# Patient Record
Sex: FEMALE | Race: WHITE | NOT HISPANIC OR LATINO | Employment: OTHER | RURAL
[De-identification: names, ages, dates, MRNs, and addresses within clinical notes are randomized per-mention and may not be internally consistent; named-entity substitution may affect disease eponyms.]

---

## 2020-02-14 ENCOUNTER — HISTORICAL (OUTPATIENT)
Dept: ADMINISTRATIVE | Facility: HOSPITAL | Age: 65
End: 2020-02-14

## 2020-02-17 LAB
LAB AP CLINICAL INFORMATION: NORMAL
LAB AP DIAGNOSIS - HISTORICAL: NORMAL
LAB AP GROSS PATHOLOGY - HISTORICAL: NORMAL
LAB AP SPECIMEN SUBMITTED - HISTORICAL: NORMAL

## 2020-03-24 ENCOUNTER — HISTORICAL (OUTPATIENT)
Dept: ADMINISTRATIVE | Facility: HOSPITAL | Age: 65
End: 2020-03-24

## 2020-03-24 LAB
ALBUMIN SERPL BCP-MCNC: 3.8 G/DL (ref 3.5–5)
ALBUMIN/GLOB SERPL: 1.1 {RATIO}
ALP SERPL-CCNC: 106 U/L (ref 50–130)
ALT SERPL W P-5'-P-CCNC: 43 U/L (ref 13–56)
AMPHET UR QL SCN: NEGATIVE
AMYLASE SERPL-CCNC: 23 U/L (ref 25–115)
APAP UR QL SCN: ABNORMAL
AST SERPL W P-5'-P-CCNC: 26 U/L (ref 15–37)
BACTERIA #/AREA URNS HPF: ABNORMAL /HPF
BARBITURATES UR QL SCN: NEGATIVE
BASOPHILS # BLD AUTO: 0.02 X10E3/UL (ref 0–0.2)
BASOPHILS NFR BLD AUTO: 0.3 % (ref 0–1)
BENZODIAZ METAB UR QL SCN: NEGATIVE
BILIRUB SERPL-MCNC: 0.3 MG/DL (ref 0–1.2)
BILIRUB UR QL STRIP: NEGATIVE MG/DL
BUN SERPL-MCNC: 12 MG/DL (ref 7–18)
BUN/CREAT SERPL: 13.2
CALCIUM SERPL-MCNC: 10.8 MG/DL (ref 8.5–10.1)
CAOX CRY #/AREA URNS LPF: ABNORMAL /LPF
CHLORIDE SERPL-SCNC: 103 MMOL/L (ref 98–107)
CLARITY UR: CLEAR
CLARITY UR: CLEAR
CO2 SERPL-SCNC: 30 MMOL/L (ref 21–32)
COCAINE UR QL SCN: NEGATIVE
COLOR UR: YELLOW
COLOR UR: YELLOW
CREAT SERPL-MCNC: 0.91 MG/DL (ref 0.55–1.02)
EOSINOPHIL # BLD AUTO: 0.09 X10E3/UL (ref 0–0.5)
EOSINOPHIL NFR BLD AUTO: 1.3 % (ref 1–4)
ERYTHROCYTE [DISTWIDTH] IN BLOOD BY AUTOMATED COUNT: 13.2 % (ref 11.5–14.5)
GLOBULIN SER-MCNC: 3.5 G/DL (ref 2–4)
GLUCOSE SERPL-MCNC: 186 MG/DL (ref 74–106)
GLUCOSE UR STRIP-MCNC: NORMAL MG/DL
HCT VFR BLD AUTO: 37.9 % (ref 38–47)
HGB BLD-MCNC: 12.8 G/DL (ref 12–16)
HYALINE CASTS #/AREA URNS LPF: ABNORMAL /LPF (ref 0–2)
IMM GRANULOCYTES # BLD AUTO: 0.02 X10E3/UL (ref 0–0.04)
IMM GRANULOCYTES NFR BLD: 0.3 % (ref 0–0.4)
KETONES UR STRIP-SCNC: NEGATIVE MG/DL
LEUKOCYTE ESTERASE UR QL STRIP: NEGATIVE LEU/UL
LIPASE SERPL-CCNC: 75 U/L (ref 73–393)
LYMPHOCYTES # BLD AUTO: 1.88 X10E3/UL (ref 1–4.8)
LYMPHOCYTES NFR BLD AUTO: 26.1 % (ref 27–41)
MCH RBC QN AUTO: 29.4 PG (ref 27–31)
MCHC RBC AUTO-ENTMCNC: 33.8 G/DL (ref 32–36)
MCV RBC AUTO: 86.9 FL (ref 80–96)
MDA UR QL SCN: NEGATIVE
METHADONE UR QL SCN: ABNORMAL
METHAMPHET UR QL SCN: ABNORMAL
MONOCYTES # BLD AUTO: 0.34 X10E3/UL (ref 0–0.8)
MONOCYTES NFR BLD AUTO: 4.7 % (ref 2–6)
MPC BLD CALC-MCNC: 10.1 FL (ref 9.4–12.4)
MUCOUS THREADS #/AREA URNS HPF: ABNORMAL /HPF
NEUTROPHILS # BLD AUTO: 4.85 X10E3/UL (ref 1.8–7.7)
NEUTROPHILS NFR BLD AUTO: 67.3 % (ref 53–65)
NITRITE UR QL STRIP: NEGATIVE
OPIATES UR QL SCN: POSITIVE
OXYCODONE UR QL SCN: NEGATIVE
PCP UR QL SCN: NEGATIVE
PH UR STRIP: 5 PH UNITS (ref 5–8)
PLATELET # BLD AUTO: 229 X10E3/UL (ref 150–400)
POTASSIUM SERPL-SCNC: 3.9 MMOL/L (ref 3.5–5.1)
PROPOXYPH UR QL SCN: NEGATIVE
PROT SERPL-MCNC: 7.3 G/DL (ref 6.4–8.2)
PROT UR QL STRIP: 30 MG/DL
RBC # BLD AUTO: 4.36 X10E6/UL (ref 4.2–5.4)
RBC # UR STRIP: ABNORMAL ERY/UL
RBC #/AREA URNS HPF: ABNORMAL /HPF (ref 0–3)
SODIUM SERPL-SCNC: 140 MMOL/L (ref 136–145)
SP GR UR STRIP: >=1.03 (ref 1–1.03)
SQUAMOUS #/AREA URNS LPF: ABNORMAL /LPF
THC UR QL SCN: NEGATIVE
TRANS CELLS #/AREA URNS LPF: ABNORMAL /LPF
TRICYCLICS UR QL SCN: NEGATIVE
TROPONIN I SERPL-MCNC: <0.017 NG/ML (ref 0–0.06)
UROBILINOGEN UR STRIP-ACNC: 0.2 MG/DL
WBC # BLD AUTO: 7.2 X10E3/UL (ref 4.5–11)
WBC #/AREA URNS HPF: ABNORMAL /HPF (ref 0–5)

## 2020-03-27 LAB
REPORT: NORMAL

## 2020-05-27 ENCOUNTER — HISTORICAL (OUTPATIENT)
Dept: ADMINISTRATIVE | Facility: HOSPITAL | Age: 65
End: 2020-05-27

## 2020-07-02 ENCOUNTER — HISTORICAL (OUTPATIENT)
Dept: ADMINISTRATIVE | Facility: HOSPITAL | Age: 65
End: 2020-07-02

## 2020-07-02 LAB
BACTERIA #/AREA URNS HPF: ABNORMAL /HPF
BILIRUB UR QL STRIP: NEGATIVE MG/DL
BUN SERPL-MCNC: 12 MG/DL (ref 7–18)
CALCIUM SERPL-MCNC: 10.3 MG/DL (ref 8.5–10.1)
CHLORIDE SERPL-SCNC: 105 MMOL/L (ref 98–107)
CLARITY UR: ABNORMAL
CLARITY UR: ABNORMAL
CO2 SERPL-SCNC: 28 MMOL/L (ref 21–32)
COLOR UR: YELLOW
COLOR UR: YELLOW
CREAT SERPL-MCNC: 1.01 MG/DL (ref 0.55–1.02)
GLUCOSE SERPL-MCNC: 204 MG/DL (ref 74–106)
GLUCOSE UR STRIP-MCNC: 500 MG/DL
KETONES UR STRIP-SCNC: ABNORMAL MG/DL
LEUKOCYTE ESTERASE UR QL STRIP: NEGATIVE LEU/UL
NITRITE UR QL STRIP: NEGATIVE
PH UR STRIP: 6.5 PH UNITS (ref 5–8)
POTASSIUM SERPL-SCNC: 4 MMOL/L (ref 3.5–5.1)
PROT UR QL STRIP: 30 MG/DL
RBC # UR STRIP: ABNORMAL ERY/UL
RBC #/AREA URNS HPF: ABNORMAL /HPF (ref 0–3)
SODIUM SERPL-SCNC: 141 MMOL/L (ref 136–145)
SP GR UR STRIP: 1.02 (ref 1–1.03)
SQUAMOUS #/AREA URNS LPF: ABNORMAL /LPF
UROBILINOGEN UR STRIP-ACNC: 0.2 MG/DL
WBC #/AREA URNS HPF: ABNORMAL /HPF (ref 0–5)

## 2020-07-20 ENCOUNTER — HISTORICAL (OUTPATIENT)
Dept: ADMINISTRATIVE | Facility: HOSPITAL | Age: 65
End: 2020-07-20

## 2020-07-20 LAB
ALBUMIN SERPL BCP-MCNC: 4 G/DL (ref 3.5–5)
ALP SERPL-CCNC: 106 U/L (ref 50–130)
ALT SERPL W P-5'-P-CCNC: 32 U/L (ref 13–56)
ANION GAP SERPL CALCULATED.3IONS-SCNC: 7 MMOL/L (ref 7–16)
AST SERPL W P-5'-P-CCNC: 19 U/L (ref 15–37)
BASOPHILS # BLD AUTO: 0.04 X10E3/UL (ref 0–0.2)
BASOPHILS NFR BLD AUTO: 0.6 % (ref 0–1)
BILIRUB DIRECT SERPL-MCNC: 0.2 MG/DL (ref 0–0.2)
BILIRUB SERPL-MCNC: 0.5 MG/DL (ref 0–1.2)
BUN SERPL-MCNC: 17 MG/DL (ref 7–18)
CALCIUM SERPL-MCNC: 10.8 MG/DL (ref 8.5–10.1)
CHLORIDE SERPL-SCNC: 106 MMOL/L (ref 98–107)
CO2 SERPL-SCNC: 30 MMOL/L (ref 21–32)
CREAT SERPL-MCNC: 0.83 MG/DL (ref 0.5–1.02)
D DIMER PPP FEU-MCNC: 0.42 UG/ML (ref 0–0.47)
EOSINOPHIL # BLD AUTO: 0.04 X10E3/UL (ref 0–0.5)
EOSINOPHIL NFR BLD AUTO: 0.6 % (ref 1–4)
ERYTHROCYTE [DISTWIDTH] IN BLOOD BY AUTOMATED COUNT: 12.2 % (ref 11.5–14.5)
GLUCOSE SERPL-MCNC: 115 MG/DL (ref 74–106)
HCT VFR BLD AUTO: 40.9 % (ref 38–47)
HGB BLD-MCNC: 13.6 G/DL (ref 12–16)
IMM GRANULOCYTES # BLD AUTO: 0.01 X10E3/UL (ref 0–0.04)
IMM GRANULOCYTES NFR BLD: 0.1 % (ref 0–0.4)
LYMPHOCYTES # BLD AUTO: 2.19 X10E3/UL (ref 1–4.8)
LYMPHOCYTES NFR BLD AUTO: 32.1 % (ref 27–41)
MCH RBC QN AUTO: 28.6 PG (ref 27–31)
MCHC RBC AUTO-ENTMCNC: 33.3 G/DL (ref 32–36)
MCV RBC AUTO: 86.1 FL (ref 80–96)
MONOCYTES # BLD AUTO: 0.28 X10E3/UL (ref 0–0.8)
MONOCYTES NFR BLD AUTO: 4.1 % (ref 2–6)
MPC BLD CALC-MCNC: 10.6 FL (ref 9.4–12.4)
NEUTROPHILS # BLD AUTO: 4.26 X10E3/UL (ref 1.8–7.7)
NEUTROPHILS NFR BLD AUTO: 62.5 % (ref 53–65)
NRBC # BLD AUTO: 0 X10E3/UL (ref 0–0)
NRBC, AUTO (.00): 0 /100 (ref 0–0)
PLATELET # BLD AUTO: 209 X10E3/UL (ref 150–400)
POTASSIUM SERPL-SCNC: 4.1 MMOL/L (ref 3.5–5.1)
PROT SERPL-MCNC: 7.8 G/DL (ref 6.4–8.2)
RBC # BLD AUTO: 4.75 X10E6/UL (ref 4.2–5.4)
SODIUM SERPL-SCNC: 139 MMOL/L (ref 136–145)
WBC # BLD AUTO: 6.82 X10E3/UL (ref 4.5–11)

## 2020-07-22 ENCOUNTER — HISTORICAL (OUTPATIENT)
Dept: ADMINISTRATIVE | Facility: HOSPITAL | Age: 65
End: 2020-07-22

## 2020-07-22 LAB — PTH-INTACT SERPL-MCNC: 98.3 PG/ML (ref 18.4–80.1)

## 2020-11-04 ENCOUNTER — HISTORICAL (OUTPATIENT)
Dept: ADMINISTRATIVE | Facility: HOSPITAL | Age: 65
End: 2020-11-04

## 2020-11-04 LAB
EST. AVERAGE GLUCOSE BLD GHB EST-MCNC: 204 MG/DL
HBA1C MFR BLD HPLC: 8.7 % (ref 4.5–6.6)

## 2020-12-22 ENCOUNTER — HISTORICAL (OUTPATIENT)
Dept: ADMINISTRATIVE | Facility: HOSPITAL | Age: 65
End: 2020-12-22

## 2020-12-22 LAB — SARS-COV+SARS-COV-2 AG RESP QL IA.RAPID: NEGATIVE

## 2021-04-06 ENCOUNTER — HISTORICAL (OUTPATIENT)
Dept: ADMINISTRATIVE | Facility: HOSPITAL | Age: 66
End: 2021-04-06

## 2021-04-06 LAB
EST. AVERAGE GLUCOSE BLD GHB EST-MCNC: 220 MG/DL
HBA1C MFR BLD HPLC: 9.2 % (ref 4.5–6.6)

## 2021-04-08 LAB
REPORT: NORMAL

## 2021-06-30 ENCOUNTER — APPOINTMENT (OUTPATIENT)
Dept: RADIOLOGY | Facility: CLINIC | Age: 66
End: 2021-06-30
Attending: FAMILY MEDICINE
Payer: MEDICAID

## 2021-06-30 ENCOUNTER — OFFICE VISIT (OUTPATIENT)
Dept: PRIMARY CARE CLINIC | Facility: CLINIC | Age: 66
End: 2021-06-30
Payer: MEDICAID

## 2021-06-30 VITALS
SYSTOLIC BLOOD PRESSURE: 138 MMHG | RESPIRATION RATE: 20 BRPM | WEIGHT: 196 LBS | BODY MASS INDEX: 32.65 KG/M2 | TEMPERATURE: 98 F | HEART RATE: 94 BPM | DIASTOLIC BLOOD PRESSURE: 88 MMHG | HEIGHT: 65 IN | OXYGEN SATURATION: 98 %

## 2021-06-30 DIAGNOSIS — E13.9 DIABETES 1.5, MANAGED AS TYPE 2: ICD-10-CM

## 2021-06-30 DIAGNOSIS — M25.562 ACUTE PAIN OF LEFT KNEE: ICD-10-CM

## 2021-06-30 DIAGNOSIS — M19.90 ARTHRITIS: ICD-10-CM

## 2021-06-30 DIAGNOSIS — M25.562 ACUTE PAIN OF LEFT KNEE: Primary | ICD-10-CM

## 2021-06-30 PROCEDURE — 73560 X-RAY EXAM OF KNEE 1 OR 2: CPT | Mod: TC,RHCUB,LT | Performed by: FAMILY MEDICINE

## 2021-06-30 PROCEDURE — 73560 X-RAY EXAM OF KNEE 1 OR 2: CPT | Mod: 26,LT,, | Performed by: RADIOLOGY

## 2021-06-30 PROCEDURE — 99212 PR OFFICE/OUTPT VISIT, EST, LEVL II, 10-19 MIN: ICD-10-PCS | Mod: ,,, | Performed by: FAMILY MEDICINE

## 2021-06-30 PROCEDURE — 99212 OFFICE O/P EST SF 10 MIN: CPT | Mod: ,,, | Performed by: FAMILY MEDICINE

## 2021-06-30 PROCEDURE — 73560 XR KNEE 1 OR 2 VIEW LEFT: ICD-10-PCS | Mod: 26,LT,, | Performed by: RADIOLOGY

## 2021-06-30 RX ORDER — HYDROCODONE BITARTRATE AND ACETAMINOPHEN 10; 325 MG/1; MG/1
1 TABLET ORAL EVERY 6 HOURS PRN
Status: ON HOLD | COMMUNITY
Start: 2021-06-21 | End: 2021-09-13 | Stop reason: HOSPADM

## 2021-06-30 RX ORDER — LISINOPRIL 10 MG/1
10 TABLET ORAL DAILY
Status: ON HOLD | COMMUNITY
Start: 2021-04-06 | End: 2022-09-14 | Stop reason: ALTCHOICE

## 2021-06-30 RX ORDER — INSULIN GLARGINE 100 [IU]/ML
62 INJECTION, SOLUTION SUBCUTANEOUS NIGHTLY
Status: ON HOLD | COMMUNITY
Start: 2021-06-21 | End: 2022-09-30 | Stop reason: CLARIF

## 2021-06-30 RX ORDER — CELECOXIB 200 MG/1
2 CAPSULE ORAL 2 TIMES DAILY
Status: ON HOLD | COMMUNITY
Start: 2021-06-04 | End: 2022-09-14 | Stop reason: ALTCHOICE

## 2021-07-12 DIAGNOSIS — M25.562 LEFT KNEE PAIN: Primary | ICD-10-CM

## 2021-07-13 ENCOUNTER — HOSPITAL ENCOUNTER (OUTPATIENT)
Dept: RADIOLOGY | Facility: HOSPITAL | Age: 66
Discharge: HOME | End: 2021-07-13
Attending: ORTHOPAEDIC SURGERY
Payer: MEDICARE

## 2021-07-13 ENCOUNTER — OFFICE VISIT (OUTPATIENT)
Dept: ORTHOPEDICS | Facility: CLINIC | Age: 66
End: 2021-07-13
Payer: MEDICARE

## 2021-07-13 DIAGNOSIS — M25.562 LEFT KNEE PAIN: ICD-10-CM

## 2021-07-13 DIAGNOSIS — S83.242A ACUTE MEDIAL MENISCUS TEAR OF LEFT KNEE, INITIAL ENCOUNTER: Primary | ICD-10-CM

## 2021-07-13 PROCEDURE — 99203 OFFICE O/P NEW LOW 30 MIN: CPT | Mod: ,,, | Performed by: ORTHOPAEDIC SURGERY

## 2021-07-13 PROCEDURE — 73564 XR KNEE COMP 4 OR MORE VIEWS LEFT: ICD-10-PCS | Mod: 26,LT,, | Performed by: ORTHOPAEDIC SURGERY

## 2021-07-13 PROCEDURE — 99203 PR OFFICE/OUTPT VISIT, NEW, LEVL III, 30-44 MIN: ICD-10-PCS | Mod: ,,, | Performed by: ORTHOPAEDIC SURGERY

## 2021-07-13 PROCEDURE — 73564 X-RAY EXAM KNEE 4 OR MORE: CPT | Mod: 26,LT,, | Performed by: ORTHOPAEDIC SURGERY

## 2021-07-13 PROCEDURE — 73564 X-RAY EXAM KNEE 4 OR MORE: CPT | Mod: TC,LT

## 2021-07-13 RX ORDER — NAPROXEN 500 MG/1
500 TABLET ORAL 2 TIMES DAILY WITH MEALS
Qty: 60 TABLET | Refills: 2 | Status: SHIPPED | OUTPATIENT
Start: 2021-07-13 | End: 2021-08-23

## 2021-08-04 ENCOUNTER — TELEPHONE (OUTPATIENT)
Dept: ORTHOPEDICS | Facility: CLINIC | Age: 66
End: 2021-08-04

## 2021-08-31 ENCOUNTER — OFFICE VISIT (OUTPATIENT)
Dept: ORTHOPEDICS | Facility: CLINIC | Age: 66
End: 2021-08-31
Payer: MEDICARE

## 2021-08-31 DIAGNOSIS — S83.242A ACUTE MEDIAL MENISCUS TEAR OF LEFT KNEE, INITIAL ENCOUNTER: ICD-10-CM

## 2021-08-31 DIAGNOSIS — M17.12 PRIMARY OSTEOARTHRITIS OF LEFT KNEE: Primary | ICD-10-CM

## 2021-08-31 PROCEDURE — 99213 PR OFFICE/OUTPT VISIT, EST, LEVL III, 20-29 MIN: ICD-10-PCS | Mod: ,,, | Performed by: ORTHOPAEDIC SURGERY

## 2021-08-31 PROCEDURE — 99213 OFFICE O/P EST LOW 20 MIN: CPT | Mod: ,,, | Performed by: ORTHOPAEDIC SURGERY

## 2021-08-31 RX ORDER — TRAMADOL HYDROCHLORIDE 50 MG/1
50 TABLET ORAL EVERY 6 HOURS PRN
Qty: 30 TABLET | Refills: 0 | Status: SHIPPED | OUTPATIENT
Start: 2021-08-31 | End: 2021-11-30 | Stop reason: SDUPTHER

## 2021-09-03 DIAGNOSIS — S83.207A ACUTE MENISCAL TEAR OF LEFT KNEE: ICD-10-CM

## 2021-09-03 DIAGNOSIS — S83.242A ACUTE MEDIAL MENISCUS TEAR OF LEFT KNEE, INITIAL ENCOUNTER: Primary | ICD-10-CM

## 2021-09-03 RX ORDER — MUPIROCIN 20 MG/G
OINTMENT TOPICAL
Status: CANCELLED | OUTPATIENT
Start: 2021-09-03

## 2021-09-03 RX ORDER — SODIUM CHLORIDE 9 MG/ML
INJECTION, SOLUTION INTRAVENOUS CONTINUOUS
Status: CANCELLED | OUTPATIENT
Start: 2021-09-03

## 2021-09-09 ENCOUNTER — CLINICAL SUPPORT (OUTPATIENT)
Dept: CARDIOLOGY | Facility: CLINIC | Age: 66
End: 2021-09-09
Payer: MEDICARE

## 2021-09-09 DIAGNOSIS — Z11.59 SPECIAL SCREENING EXAMINATION FOR UNSPECIFIED VIRAL DISEASE: Primary | ICD-10-CM

## 2021-09-09 DIAGNOSIS — Z01.818 PREPROCEDURAL EXAMINATION: ICD-10-CM

## 2021-09-09 DIAGNOSIS — Z01.812 ENCOUNTER FOR PREPROCEDURAL LABORATORY EXAMINATION: ICD-10-CM

## 2021-09-09 PROCEDURE — 93010 EKG 12-LEAD: ICD-10-PCS | Mod: S$PBB,,, | Performed by: STUDENT IN AN ORGANIZED HEALTH CARE EDUCATION/TRAINING PROGRAM

## 2021-09-09 PROCEDURE — 93010 ELECTROCARDIOGRAM REPORT: CPT | Mod: S$PBB,,, | Performed by: STUDENT IN AN ORGANIZED HEALTH CARE EDUCATION/TRAINING PROGRAM

## 2021-09-09 PROCEDURE — 99212 OFFICE O/P EST SF 10 MIN: CPT | Mod: PBBFAC

## 2021-09-09 PROCEDURE — 93005 ELECTROCARDIOGRAM TRACING: CPT | Mod: PBBFAC | Performed by: STUDENT IN AN ORGANIZED HEALTH CARE EDUCATION/TRAINING PROGRAM

## 2021-09-09 RX ORDER — LISINOPRIL 20 MG/1
40 TABLET ORAL DAILY
Status: ON HOLD | COMMUNITY
Start: 2021-07-27 | End: 2022-09-14 | Stop reason: ALTCHOICE

## 2021-09-09 RX ORDER — AZITHROMYCIN 250 MG/1
TABLET, FILM COATED ORAL
COMMUNITY
Start: 2021-08-05 | End: 2022-02-01

## 2021-09-09 RX ORDER — CEPHALEXIN 500 MG/1
CAPSULE ORAL
COMMUNITY
Start: 2021-07-27 | End: 2022-02-01

## 2021-09-13 ENCOUNTER — ANESTHESIA EVENT (OUTPATIENT)
Dept: SURGERY | Facility: HOSPITAL | Age: 66
End: 2021-09-13
Payer: MEDICARE

## 2021-09-13 ENCOUNTER — HOSPITAL ENCOUNTER (OUTPATIENT)
Facility: HOSPITAL | Age: 66
Discharge: HOME | End: 2021-09-13
Attending: ORTHOPAEDIC SURGERY | Admitting: ORTHOPAEDIC SURGERY
Payer: MEDICARE

## 2021-09-13 ENCOUNTER — ANESTHESIA (OUTPATIENT)
Dept: SURGERY | Facility: HOSPITAL | Age: 66
End: 2021-09-13
Payer: MEDICARE

## 2021-09-13 VITALS
BODY MASS INDEX: 32.49 KG/M2 | TEMPERATURE: 98 F | OXYGEN SATURATION: 97 % | SYSTOLIC BLOOD PRESSURE: 175 MMHG | HEIGHT: 65 IN | WEIGHT: 195 LBS | RESPIRATION RATE: 16 BRPM | DIASTOLIC BLOOD PRESSURE: 73 MMHG | HEART RATE: 109 BPM

## 2021-09-13 DIAGNOSIS — Z98.890 S/P LEFT KNEE ARTHROSCOPY: Primary | ICD-10-CM

## 2021-09-13 DIAGNOSIS — S83.207A ACUTE MENISCAL TEAR OF LEFT KNEE: ICD-10-CM

## 2021-09-13 DIAGNOSIS — S83.242A ACUTE MEDIAL MENISCUS TEAR OF LEFT KNEE, INITIAL ENCOUNTER: Primary | ICD-10-CM

## 2021-09-13 LAB
GLUCOSE SERPL-MCNC: 229 MG/DL (ref 70–105)
GLUCOSE SERPL-MCNC: 232 MG/DL (ref 70–105)

## 2021-09-13 PROCEDURE — 37000009 HC ANESTHESIA EA ADD 15 MINS: Performed by: ORTHOPAEDIC SURGERY

## 2021-09-13 PROCEDURE — C1713 ANCHOR/SCREW BN/BN,TIS/BN: HCPCS | Performed by: ORTHOPAEDIC SURGERY

## 2021-09-13 PROCEDURE — 29882 ARTHRS KNE SRG MNISC RPR M/L: CPT | Mod: LT,,, | Performed by: ORTHOPAEDIC SURGERY

## 2021-09-13 PROCEDURE — 29882 PR KNEE SCOPE,MED OR LAT MENIS REPAIR: ICD-10-PCS | Mod: LT,,, | Performed by: ORTHOPAEDIC SURGERY

## 2021-09-13 PROCEDURE — 71000033 HC RECOVERY, INTIAL HOUR: Performed by: ORTHOPAEDIC SURGERY

## 2021-09-13 PROCEDURE — 71000015 HC POSTOP RECOV 1ST HR: Performed by: ORTHOPAEDIC SURGERY

## 2021-09-13 PROCEDURE — D9220A PRA ANESTHESIA: ICD-10-PCS | Mod: ANES,ICN,, | Performed by: ANESTHESIOLOGY

## 2021-09-13 PROCEDURE — 82962 GLUCOSE BLOOD TEST: CPT

## 2021-09-13 PROCEDURE — D9220A PRA ANESTHESIA: Mod: ANES,ICN,, | Performed by: ANESTHESIOLOGY

## 2021-09-13 PROCEDURE — 27000177 HC AIRWAY, LARYNGEAL MASK: Performed by: ANESTHESIOLOGY

## 2021-09-13 PROCEDURE — 27201423 OPTIME MED/SURG SUP & DEVICES STERILE SUPPLY: Performed by: ORTHOPAEDIC SURGERY

## 2021-09-13 PROCEDURE — 36000710: Performed by: ORTHOPAEDIC SURGERY

## 2021-09-13 PROCEDURE — 36000711: Performed by: ORTHOPAEDIC SURGERY

## 2021-09-13 PROCEDURE — 63600175 PHARM REV CODE 636 W HCPCS: Performed by: NURSE ANESTHETIST, CERTIFIED REGISTERED

## 2021-09-13 PROCEDURE — D9220A PRA ANESTHESIA: ICD-10-PCS | Mod: CRNA,,, | Performed by: NURSE ANESTHETIST, CERTIFIED REGISTERED

## 2021-09-13 PROCEDURE — 29879 PR KNEE SCOPE,ABRASN ARTHROPLASTY: ICD-10-PCS | Mod: LT,,, | Performed by: ORTHOPAEDIC SURGERY

## 2021-09-13 PROCEDURE — 27100168 OPTIME MED/SURG SUP & DEVICES NON-STERILE SUPPLY: Performed by: ORTHOPAEDIC SURGERY

## 2021-09-13 PROCEDURE — 71000016 HC POSTOP RECOV ADDL HR: Performed by: ORTHOPAEDIC SURGERY

## 2021-09-13 PROCEDURE — 25000003 PHARM REV CODE 250: Performed by: NURSE ANESTHETIST, CERTIFIED REGISTERED

## 2021-09-13 PROCEDURE — 27000510 HC BLANKET BAIR HUGGER ANY SIZE: Performed by: ANESTHESIOLOGY

## 2021-09-13 PROCEDURE — 29879 ARTHRS KNE SRG ABRASJ ARTHRP: CPT | Mod: LT,,, | Performed by: ORTHOPAEDIC SURGERY

## 2021-09-13 PROCEDURE — 25000003 PHARM REV CODE 250: Performed by: ORTHOPAEDIC SURGERY

## 2021-09-13 PROCEDURE — 37000008 HC ANESTHESIA 1ST 15 MINUTES: Performed by: ORTHOPAEDIC SURGERY

## 2021-09-13 PROCEDURE — D9220A PRA ANESTHESIA: Mod: CRNA,,, | Performed by: NURSE ANESTHETIST, CERTIFIED REGISTERED

## 2021-09-13 PROCEDURE — 97161 PT EVAL LOW COMPLEX 20 MIN: CPT

## 2021-09-13 PROCEDURE — 63600175 PHARM REV CODE 636 W HCPCS: Performed by: ORTHOPAEDIC SURGERY

## 2021-09-13 PROCEDURE — 63600175 PHARM REV CODE 636 W HCPCS: Performed by: ANESTHESIOLOGY

## 2021-09-13 PROCEDURE — 27000716 HC OXISENSOR PROBE, ANY SIZE: Performed by: ANESTHESIOLOGY

## 2021-09-13 PROCEDURE — 25000003 PHARM REV CODE 250: Performed by: ANESTHESIOLOGY

## 2021-09-13 DEVICE — IMPLANTABLE DEVICE: Type: IMPLANTABLE DEVICE | Site: KNEE | Status: FUNCTIONAL

## 2021-09-13 RX ORDER — OXYCODONE AND ACETAMINOPHEN 5; 325 MG/1; MG/1
1 TABLET ORAL EVERY 4 HOURS PRN
Status: DISCONTINUED | OUTPATIENT
Start: 2021-09-13 | End: 2021-09-13 | Stop reason: HOSPADM

## 2021-09-13 RX ORDER — ONDANSETRON 4 MG/1
4 TABLET, ORALLY DISINTEGRATING ORAL EVERY 6 HOURS PRN
Qty: 30 TABLET | Refills: 0 | Status: SHIPPED | OUTPATIENT
Start: 2021-09-13 | End: 2022-02-01

## 2021-09-13 RX ORDER — SODIUM CHLORIDE, SODIUM LACTATE, POTASSIUM CHLORIDE, CALCIUM CHLORIDE 600; 310; 30; 20 MG/100ML; MG/100ML; MG/100ML; MG/100ML
INJECTION, SOLUTION INTRAVENOUS CONTINUOUS
Status: DISCONTINUED | OUTPATIENT
Start: 2021-09-13 | End: 2021-09-13

## 2021-09-13 RX ORDER — BUPIVACAINE HYDROCHLORIDE 2.5 MG/ML
INJECTION, SOLUTION EPIDURAL; INFILTRATION; INTRACAUDAL
Status: DISCONTINUED | OUTPATIENT
Start: 2021-09-13 | End: 2021-09-13 | Stop reason: HOSPADM

## 2021-09-13 RX ORDER — ONDANSETRON 2 MG/ML
INJECTION INTRAMUSCULAR; INTRAVENOUS
Status: DISCONTINUED | OUTPATIENT
Start: 2021-09-13 | End: 2021-09-13

## 2021-09-13 RX ORDER — LIDOCAINE HYDROCHLORIDE 20 MG/ML
INJECTION, SOLUTION EPIDURAL; INFILTRATION; INTRACAUDAL; PERINEURAL
Status: DISCONTINUED | OUTPATIENT
Start: 2021-09-13 | End: 2021-09-13

## 2021-09-13 RX ORDER — MUPIROCIN 20 MG/G
OINTMENT TOPICAL
Status: DISCONTINUED | OUTPATIENT
Start: 2021-09-13 | End: 2021-09-13 | Stop reason: HOSPADM

## 2021-09-13 RX ORDER — MORPHINE SULFATE 10 MG/ML
4 INJECTION INTRAMUSCULAR; INTRAVENOUS; SUBCUTANEOUS EVERY 5 MIN PRN
Status: DISCONTINUED | OUTPATIENT
Start: 2021-09-13 | End: 2021-09-13 | Stop reason: HOSPADM

## 2021-09-13 RX ORDER — ONDANSETRON 4 MG/1
8 TABLET, ORALLY DISINTEGRATING ORAL EVERY 8 HOURS PRN
Status: DISCONTINUED | OUTPATIENT
Start: 2021-09-13 | End: 2021-09-13 | Stop reason: HOSPADM

## 2021-09-13 RX ORDER — DIAZEPAM 5 MG/1
10 TABLET ORAL ONCE
Status: COMPLETED | OUTPATIENT
Start: 2021-09-13 | End: 2021-09-13

## 2021-09-13 RX ORDER — LIDOCAINE HYDROCHLORIDE 10 MG/ML
1 INJECTION, SOLUTION EPIDURAL; INFILTRATION; INTRACAUDAL; PERINEURAL ONCE
Status: DISCONTINUED | OUTPATIENT
Start: 2021-09-13 | End: 2021-09-13 | Stop reason: HOSPADM

## 2021-09-13 RX ORDER — HYDROMORPHONE HYDROCHLORIDE 2 MG/ML
INJECTION, SOLUTION INTRAMUSCULAR; INTRAVENOUS; SUBCUTANEOUS
Status: DISCONTINUED | OUTPATIENT
Start: 2021-09-13 | End: 2021-09-13

## 2021-09-13 RX ORDER — DEXAMETHASONE SODIUM PHOSPHATE 4 MG/ML
INJECTION, SOLUTION INTRA-ARTICULAR; INTRALESIONAL; INTRAMUSCULAR; INTRAVENOUS; SOFT TISSUE
Status: DISCONTINUED | OUTPATIENT
Start: 2021-09-13 | End: 2021-09-13

## 2021-09-13 RX ORDER — DIPHENHYDRAMINE HYDROCHLORIDE 50 MG/ML
25 INJECTION INTRAMUSCULAR; INTRAVENOUS EVERY 6 HOURS PRN
Status: DISCONTINUED | OUTPATIENT
Start: 2021-09-13 | End: 2021-09-13 | Stop reason: HOSPADM

## 2021-09-13 RX ORDER — PHENYLEPHRINE HYDROCHLORIDE 10 MG/ML
INJECTION INTRAVENOUS
Status: DISCONTINUED | OUTPATIENT
Start: 2021-09-13 | End: 2021-09-13

## 2021-09-13 RX ORDER — NAPROXEN 500 MG/1
500 TABLET ORAL 2 TIMES DAILY WITH MEALS
COMMUNITY
End: 2022-02-01

## 2021-09-13 RX ORDER — FENTANYL CITRATE 50 UG/ML
INJECTION, SOLUTION INTRAMUSCULAR; INTRAVENOUS
Status: DISCONTINUED | OUTPATIENT
Start: 2021-09-13 | End: 2021-09-13

## 2021-09-13 RX ORDER — CEFAZOLIN SODIUM 2 G/50ML
2 SOLUTION INTRAVENOUS
Status: COMPLETED | OUTPATIENT
Start: 2021-09-13 | End: 2021-09-13

## 2021-09-13 RX ORDER — PROPOFOL 10 MG/ML
VIAL (ML) INTRAVENOUS
Status: DISCONTINUED | OUTPATIENT
Start: 2021-09-13 | End: 2021-09-13

## 2021-09-13 RX ORDER — HYDROMORPHONE HYDROCHLORIDE 2 MG/ML
0.5 INJECTION, SOLUTION INTRAMUSCULAR; INTRAVENOUS; SUBCUTANEOUS EVERY 5 MIN PRN
Status: DISCONTINUED | OUTPATIENT
Start: 2021-09-13 | End: 2021-09-13 | Stop reason: HOSPADM

## 2021-09-13 RX ORDER — MEPERIDINE HYDROCHLORIDE 25 MG/ML
25 INJECTION INTRAMUSCULAR; INTRAVENOUS; SUBCUTANEOUS EVERY 10 MIN PRN
Status: DISCONTINUED | OUTPATIENT
Start: 2021-09-13 | End: 2021-09-13 | Stop reason: HOSPADM

## 2021-09-13 RX ORDER — OXYCODONE AND ACETAMINOPHEN 10; 325 MG/1; MG/1
1 TABLET ORAL EVERY 6 HOURS PRN
Qty: 30 TABLET | Refills: 0 | OUTPATIENT
Start: 2021-09-13 | End: 2022-02-01

## 2021-09-13 RX ORDER — OXYCODONE HYDROCHLORIDE 5 MG/1
10 TABLET ORAL EVERY 4 HOURS PRN
Status: DISCONTINUED | OUTPATIENT
Start: 2021-09-13 | End: 2021-09-13 | Stop reason: HOSPADM

## 2021-09-13 RX ORDER — MIDAZOLAM HYDROCHLORIDE 1 MG/ML
INJECTION INTRAMUSCULAR; INTRAVENOUS
Status: DISCONTINUED | OUTPATIENT
Start: 2021-09-13 | End: 2021-09-13

## 2021-09-13 RX ORDER — ONDANSETRON 2 MG/ML
4 INJECTION INTRAMUSCULAR; INTRAVENOUS DAILY PRN
Status: DISCONTINUED | OUTPATIENT
Start: 2021-09-13 | End: 2021-09-13 | Stop reason: HOSPADM

## 2021-09-13 RX ORDER — SODIUM CHLORIDE 9 MG/ML
INJECTION, SOLUTION INTRAVENOUS CONTINUOUS
Status: DISCONTINUED | OUTPATIENT
Start: 2021-09-13 | End: 2021-09-13 | Stop reason: HOSPADM

## 2021-09-13 RX ADMIN — HYDROMORPHONE HYDROCHLORIDE 0.5 MG: 2 INJECTION, SOLUTION INTRAMUSCULAR; INTRAVENOUS; SUBCUTANEOUS at 09:09

## 2021-09-13 RX ADMIN — PHENYLEPHRINE HYDROCHLORIDE 100 MCG: 10 INJECTION INTRAVENOUS at 08:09

## 2021-09-13 RX ADMIN — DEXAMETHASONE SODIUM PHOSPHATE 4 MG: 4 INJECTION, SOLUTION INTRA-ARTICULAR; INTRALESIONAL; INTRAMUSCULAR; INTRAVENOUS; SOFT TISSUE at 07:09

## 2021-09-13 RX ADMIN — LIDOCAINE HYDROCHLORIDE 50 MG: 20 INJECTION, SOLUTION INTRAVENOUS at 07:09

## 2021-09-13 RX ADMIN — OXYCODONE HYDROCHLORIDE AND ACETAMINOPHEN 1 TABLET: 5; 325 TABLET ORAL at 10:09

## 2021-09-13 RX ADMIN — FENTANYL CITRATE 25 MCG: 50 INJECTION INTRAMUSCULAR; INTRAVENOUS at 07:09

## 2021-09-13 RX ADMIN — SODIUM CHLORIDE: 9 INJECTION, SOLUTION INTRAVENOUS at 08:09

## 2021-09-13 RX ADMIN — MIDAZOLAM HYDROCHLORIDE 2 MG: 1 INJECTION, SOLUTION INTRAMUSCULAR; INTRAVENOUS at 07:09

## 2021-09-13 RX ADMIN — HYDROMORPHONE HYDROCHLORIDE 0.6 MG: 2 INJECTION, SOLUTION INTRAMUSCULAR; INTRAVENOUS; SUBCUTANEOUS at 08:09

## 2021-09-13 RX ADMIN — ONDANSETRON 8 MG: 2 INJECTION INTRAMUSCULAR; INTRAVENOUS at 07:09

## 2021-09-13 RX ADMIN — DIAZEPAM 10 MG: 5 TABLET ORAL at 06:09

## 2021-09-13 RX ADMIN — FENTANYL CITRATE 75 MCG: 50 INJECTION INTRAMUSCULAR; INTRAVENOUS at 08:09

## 2021-09-13 RX ADMIN — PROPOFOL 150 MG: 10 INJECTION, EMULSION INTRAVENOUS at 07:09

## 2021-09-13 RX ADMIN — CEFAZOLIN SODIUM 2 G: 1 INJECTION, POWDER, FOR SOLUTION INTRAMUSCULAR; INTRAVENOUS at 07:09

## 2021-09-13 RX ADMIN — SODIUM CHLORIDE: 9 INJECTION, SOLUTION INTRAVENOUS at 06:09

## 2021-09-14 RX ORDER — ESZOPICLONE 2 MG/1
2 TABLET, FILM COATED ORAL NIGHTLY
Qty: 30 TABLET | Refills: 0 | Status: SHIPPED | OUTPATIENT
Start: 2021-09-14 | End: 2021-10-14

## 2021-09-22 ENCOUNTER — OFFICE VISIT (OUTPATIENT)
Dept: ORTHOPEDICS | Facility: CLINIC | Age: 66
End: 2021-09-22
Payer: MEDICARE

## 2021-09-22 DIAGNOSIS — Z98.890 S/P LEFT KNEE ARTHROSCOPY: Primary | ICD-10-CM

## 2021-09-22 PROCEDURE — 99024 PR POST-OP FOLLOW-UP VISIT: ICD-10-PCS | Mod: ,,, | Performed by: NURSE PRACTITIONER

## 2021-09-22 PROCEDURE — 99024 POSTOP FOLLOW-UP VISIT: CPT | Mod: ,,, | Performed by: NURSE PRACTITIONER

## 2021-09-22 RX ORDER — HYDROCODONE BITARTRATE AND ACETAMINOPHEN 10; 325 MG/1; MG/1
1 TABLET ORAL EVERY 6 HOURS PRN
Qty: 25 TABLET | Refills: 0 | OUTPATIENT
Start: 2021-09-22 | End: 2022-02-01

## 2021-09-23 ENCOUNTER — CLINICAL SUPPORT (OUTPATIENT)
Dept: REHABILITATION | Facility: HOSPITAL | Age: 66
End: 2021-09-23
Payer: MEDICARE

## 2021-09-23 DIAGNOSIS — Z98.890 S/P LEFT KNEE ARTHROSCOPY: ICD-10-CM

## 2021-09-23 DIAGNOSIS — R52 PAIN: ICD-10-CM

## 2021-09-23 PROCEDURE — 97016 VASOPNEUMATIC DEVICE THERAPY: CPT

## 2021-09-23 PROCEDURE — 97110 THERAPEUTIC EXERCISES: CPT

## 2021-09-23 PROCEDURE — 97161 PT EVAL LOW COMPLEX 20 MIN: CPT

## 2021-09-28 ENCOUNTER — CLINICAL SUPPORT (OUTPATIENT)
Dept: REHABILITATION | Facility: HOSPITAL | Age: 66
End: 2021-09-28
Payer: MEDICARE

## 2021-09-28 DIAGNOSIS — R52 PAIN: ICD-10-CM

## 2021-09-28 PROCEDURE — 97530 THERAPEUTIC ACTIVITIES: CPT | Mod: CQ

## 2021-09-28 PROCEDURE — 97116 GAIT TRAINING THERAPY: CPT | Mod: CQ

## 2021-09-28 PROCEDURE — 97110 THERAPEUTIC EXERCISES: CPT | Mod: CQ

## 2021-09-30 ENCOUNTER — CLINICAL SUPPORT (OUTPATIENT)
Dept: REHABILITATION | Facility: HOSPITAL | Age: 66
End: 2021-09-30
Payer: MEDICARE

## 2021-09-30 DIAGNOSIS — R52 PAIN: ICD-10-CM

## 2021-09-30 PROCEDURE — 97530 THERAPEUTIC ACTIVITIES: CPT

## 2021-09-30 PROCEDURE — 97110 THERAPEUTIC EXERCISES: CPT

## 2021-10-05 ENCOUNTER — CLINICAL SUPPORT (OUTPATIENT)
Dept: REHABILITATION | Facility: HOSPITAL | Age: 66
End: 2021-10-05
Payer: MEDICARE

## 2021-10-05 DIAGNOSIS — R52 PAIN: ICD-10-CM

## 2021-10-05 PROCEDURE — 97110 THERAPEUTIC EXERCISES: CPT | Mod: CQ

## 2021-10-05 PROCEDURE — 97016 VASOPNEUMATIC DEVICE THERAPY: CPT | Mod: CQ

## 2021-10-05 PROCEDURE — 97530 THERAPEUTIC ACTIVITIES: CPT | Mod: CQ

## 2021-10-07 ENCOUNTER — CLINICAL SUPPORT (OUTPATIENT)
Dept: REHABILITATION | Facility: HOSPITAL | Age: 66
End: 2021-10-07
Payer: MEDICARE

## 2021-10-07 DIAGNOSIS — R52 PAIN: ICD-10-CM

## 2021-10-07 PROCEDURE — 97110 THERAPEUTIC EXERCISES: CPT | Mod: KX,CQ

## 2021-10-07 PROCEDURE — 97530 THERAPEUTIC ACTIVITIES: CPT | Mod: KX,CQ

## 2021-10-07 PROCEDURE — 97016 VASOPNEUMATIC DEVICE THERAPY: CPT | Mod: KX,CQ

## 2021-10-12 ENCOUNTER — CLINICAL SUPPORT (OUTPATIENT)
Dept: REHABILITATION | Facility: HOSPITAL | Age: 66
End: 2021-10-12
Payer: MEDICARE

## 2021-10-12 DIAGNOSIS — R52 PAIN: ICD-10-CM

## 2021-10-12 PROCEDURE — 97016 VASOPNEUMATIC DEVICE THERAPY: CPT | Mod: KX,CQ

## 2021-10-12 PROCEDURE — 97530 THERAPEUTIC ACTIVITIES: CPT | Mod: KX,CQ

## 2021-10-12 PROCEDURE — 97110 THERAPEUTIC EXERCISES: CPT | Mod: KX,CQ

## 2021-10-19 ENCOUNTER — CLINICAL SUPPORT (OUTPATIENT)
Dept: REHABILITATION | Facility: HOSPITAL | Age: 66
End: 2021-10-19
Payer: MEDICARE

## 2021-10-19 DIAGNOSIS — R52 PAIN: ICD-10-CM

## 2021-10-19 PROCEDURE — 97110 THERAPEUTIC EXERCISES: CPT

## 2021-10-19 PROCEDURE — 97530 THERAPEUTIC ACTIVITIES: CPT

## 2021-10-25 ENCOUNTER — CLINICAL SUPPORT (OUTPATIENT)
Dept: REHABILITATION | Facility: HOSPITAL | Age: 66
End: 2021-10-25
Payer: MEDICARE

## 2021-10-25 DIAGNOSIS — R52 PAIN: ICD-10-CM

## 2021-10-25 PROCEDURE — 97016 VASOPNEUMATIC DEVICE THERAPY: CPT | Mod: CQ

## 2021-10-25 PROCEDURE — 97530 THERAPEUTIC ACTIVITIES: CPT | Mod: CQ

## 2021-10-25 PROCEDURE — 97110 THERAPEUTIC EXERCISES: CPT | Mod: CQ

## 2021-10-26 ENCOUNTER — OFFICE VISIT (OUTPATIENT)
Dept: ORTHOPEDICS | Facility: CLINIC | Age: 66
End: 2021-10-26
Payer: MEDICARE

## 2021-10-26 DIAGNOSIS — Z98.890 S/P ARTHROSCOPY OF KNEE: Primary | ICD-10-CM

## 2021-10-26 PROCEDURE — 99024 POSTOP FOLLOW-UP VISIT: CPT | Mod: ,,, | Performed by: ORTHOPAEDIC SURGERY

## 2021-10-26 PROCEDURE — 99024 PR POST-OP FOLLOW-UP VISIT: ICD-10-PCS | Mod: ,,, | Performed by: ORTHOPAEDIC SURGERY

## 2021-10-28 ENCOUNTER — CLINICAL SUPPORT (OUTPATIENT)
Dept: REHABILITATION | Facility: HOSPITAL | Age: 66
End: 2021-10-28
Payer: MEDICARE

## 2021-10-28 DIAGNOSIS — R52 PAIN: ICD-10-CM

## 2021-10-28 PROCEDURE — 97530 THERAPEUTIC ACTIVITIES: CPT | Mod: CQ

## 2021-10-28 PROCEDURE — 97110 THERAPEUTIC EXERCISES: CPT | Mod: CQ

## 2021-11-04 ENCOUNTER — CLINICAL SUPPORT (OUTPATIENT)
Dept: REHABILITATION | Facility: HOSPITAL | Age: 66
End: 2021-11-04
Payer: MEDICARE

## 2021-11-04 DIAGNOSIS — R52 PAIN: ICD-10-CM

## 2021-11-04 PROCEDURE — 97016 VASOPNEUMATIC DEVICE THERAPY: CPT | Mod: KX,CQ

## 2021-11-04 RX ORDER — NAPROXEN 500 MG/1
500 TABLET ORAL 2 TIMES DAILY WITH MEALS
Qty: 60 TABLET | Refills: 3 | OUTPATIENT
Start: 2021-11-04 | End: 2022-02-01

## 2021-11-16 ENCOUNTER — DOCUMENTATION ONLY (OUTPATIENT)
Dept: REHABILITATION | Facility: HOSPITAL | Age: 66
End: 2021-11-16
Payer: MEDICARE

## 2021-11-23 ENCOUNTER — OFFICE VISIT (OUTPATIENT)
Dept: ORTHOPEDICS | Facility: CLINIC | Age: 66
End: 2021-11-23
Payer: MEDICARE

## 2021-11-23 DIAGNOSIS — M19.91 PRIMARY LOCALIZED OSTEOARTHRITIS: Primary | ICD-10-CM

## 2021-11-23 PROCEDURE — 99024 PR POST-OP FOLLOW-UP VISIT: ICD-10-PCS | Mod: ,,, | Performed by: ORTHOPAEDIC SURGERY

## 2021-11-23 PROCEDURE — 20610 LARGE JOINT ASPIRATION/INJECTION: L SUPRA PATELLAR BURSA: ICD-10-PCS | Mod: 79,LT,, | Performed by: ORTHOPAEDIC SURGERY

## 2021-11-23 PROCEDURE — 99024 POSTOP FOLLOW-UP VISIT: CPT | Mod: ,,, | Performed by: ORTHOPAEDIC SURGERY

## 2021-11-23 PROCEDURE — 20610 DRAIN/INJ JOINT/BURSA W/O US: CPT | Mod: 79,LT,, | Performed by: ORTHOPAEDIC SURGERY

## 2021-11-23 RX ORDER — TRIAMCINOLONE ACETONIDE 40 MG/ML
40 INJECTION, SUSPENSION INTRA-ARTICULAR; INTRAMUSCULAR
Status: DISCONTINUED | OUTPATIENT
Start: 2021-11-23 | End: 2021-11-23 | Stop reason: HOSPADM

## 2021-11-23 RX ORDER — BUPIVACAINE HYDROCHLORIDE 5 MG/ML
3 INJECTION, SOLUTION PERINEURAL
Status: DISCONTINUED | OUTPATIENT
Start: 2021-11-23 | End: 2021-11-23 | Stop reason: HOSPADM

## 2021-11-23 RX ADMIN — BUPIVACAINE HYDROCHLORIDE 3 ML: 5 INJECTION, SOLUTION PERINEURAL at 01:11

## 2021-11-23 RX ADMIN — TRIAMCINOLONE ACETONIDE 40 MG: 40 INJECTION, SUSPENSION INTRA-ARTICULAR; INTRAMUSCULAR at 01:11

## 2021-11-30 RX ORDER — TRAMADOL HYDROCHLORIDE 50 MG/1
50 TABLET ORAL EVERY 6 HOURS PRN
Qty: 30 TABLET | Refills: 0 | OUTPATIENT
Start: 2021-11-30 | End: 2022-02-01

## 2022-01-04 ENCOUNTER — OFFICE VISIT (OUTPATIENT)
Dept: ORTHOPEDICS | Facility: CLINIC | Age: 67
End: 2022-01-04
Payer: MEDICARE

## 2022-01-04 DIAGNOSIS — M17.12 PRIMARY OSTEOARTHRITIS OF LEFT KNEE: ICD-10-CM

## 2022-01-04 DIAGNOSIS — Z98.890 S/P ARTHROSCOPY OF KNEE: Primary | ICD-10-CM

## 2022-01-04 PROCEDURE — 99213 OFFICE O/P EST LOW 20 MIN: CPT | Mod: ,,, | Performed by: ORTHOPAEDIC SURGERY

## 2022-01-04 PROCEDURE — 99213 PR OFFICE/OUTPT VISIT, EST, LEVL III, 20-29 MIN: ICD-10-PCS | Mod: ,,, | Performed by: ORTHOPAEDIC SURGERY

## 2022-01-04 NOTE — PROGRESS NOTES
HISTORY OF PRESENT ILLNESS:       Arthroscopy, Knee, With Meniscectomy - Left and Repair, Meniscus, Knee - Left 9/13/2021      Pt is here today for Third post-operative followup of her knee arthroscopy.  she is not doing well.  We have reviewed her findings and discussed plan of care and future treatment options, including the physical therapy plan.      Reports pain often,worse at night. Therapy at [facility]. She reports the injection from last visit did not help much.Tramadol was prescribed on 11/30/21 but this gave her no relief  PHYSICAL EXAMINATION:     Incision sites healed well  No evidence of any erythema, infection or induration  Range of motion 0-120 degrees  Minimal effusion  2+ DP pulse  No swelling, no calf tenderness  - Cristobal's sign  Negative medial joint line tendernes  Moderate quad atrophy                                                                                 ASSESSMENT:                                                                                                                                               1. Status post above, doing well.                                                                                                                               PLAN:                                                                                                                                                     She continues to have significant pain in her left knee.  She has a osteochondral defect and a bad radial meniscus tear which unfortunately does not appear to be healing at this point.  She is about 4 months out from surgery.  At this point she has tried injections following surgery in the still has failed to resolve her pain.  May be headed towards having to perform a total knee arthroplasty in order to resolve her pain and symptoms.  I am going to see her back in 1 months time with discussed possible further treatment options at that visit.  The options and process  of total knee replacement versus partial knee replacement were discussed in detail today       There are no Patient Instructions on file for this visit.

## 2022-02-01 ENCOUNTER — HOSPITAL ENCOUNTER (EMERGENCY)
Facility: HOSPITAL | Age: 67
Discharge: HOME | End: 2022-02-01
Attending: EMERGENCY MEDICINE
Payer: MEDICARE

## 2022-02-01 VITALS
DIASTOLIC BLOOD PRESSURE: 77 MMHG | OXYGEN SATURATION: 97 % | HEART RATE: 100 BPM | WEIGHT: 180 LBS | HEIGHT: 65 IN | BODY MASS INDEX: 29.99 KG/M2 | SYSTOLIC BLOOD PRESSURE: 164 MMHG | RESPIRATION RATE: 18 BRPM | TEMPERATURE: 98 F

## 2022-02-01 DIAGNOSIS — N39.0 BACTERIAL URINARY INFECTION: ICD-10-CM

## 2022-02-01 DIAGNOSIS — N20.1 URETEROLITHIASIS: Primary | ICD-10-CM

## 2022-02-01 DIAGNOSIS — A49.9 BACTERIAL URINARY INFECTION: ICD-10-CM

## 2022-02-01 LAB
BACTERIA #/AREA URNS HPF: ABNORMAL /HPF
BILIRUB UR QL STRIP: NEGATIVE
CLARITY UR: ABNORMAL
COLOR UR: YELLOW
GLUCOSE UR STRIP-MCNC: 100 MG/DL
KETONES UR STRIP-SCNC: 40 MG/DL
LEUKOCYTE ESTERASE UR QL STRIP: ABNORMAL
NITRITE UR QL STRIP: NEGATIVE
PH UR STRIP: 7 PH UNITS
PROT UR QL STRIP: 100
RBC # UR STRIP: ABNORMAL /UL
RBC #/AREA URNS HPF: ABNORMAL /HPF
SP GR UR STRIP: 1.02
SQUAMOUS #/AREA URNS LPF: ABNORMAL /LPF
UROBILINOGEN UR STRIP-ACNC: 0.2 MG/DL
WBC #/AREA URNS HPF: ABNORMAL /HPF

## 2022-02-01 PROCEDURE — 96361 HYDRATE IV INFUSION ADD-ON: CPT

## 2022-02-01 PROCEDURE — 96372 THER/PROPH/DIAG INJ SC/IM: CPT

## 2022-02-01 PROCEDURE — 96365 THER/PROPH/DIAG IV INF INIT: CPT

## 2022-02-01 PROCEDURE — 96376 TX/PRO/DX INJ SAME DRUG ADON: CPT

## 2022-02-01 PROCEDURE — 63600175 PHARM REV CODE 636 W HCPCS: Performed by: EMERGENCY MEDICINE

## 2022-02-01 PROCEDURE — 96375 TX/PRO/DX INJ NEW DRUG ADDON: CPT

## 2022-02-01 PROCEDURE — 99285 EMERGENCY DEPT VISIT HI MDM: CPT | Performed by: EMERGENCY MEDICINE

## 2022-02-01 PROCEDURE — 25000003 PHARM REV CODE 250: Performed by: EMERGENCY MEDICINE

## 2022-02-01 PROCEDURE — 81001 URINALYSIS AUTO W/SCOPE: CPT | Performed by: EMERGENCY MEDICINE

## 2022-02-01 PROCEDURE — 99285 EMERGENCY DEPT VISIT HI MDM: CPT | Mod: 25

## 2022-02-01 RX ORDER — ONDANSETRON 4 MG/1
4 TABLET, ORALLY DISINTEGRATING ORAL EVERY 6 HOURS PRN
Qty: 20 TABLET | Refills: 0 | Status: SHIPPED | OUTPATIENT
Start: 2022-02-01 | End: 2022-02-06

## 2022-02-01 RX ORDER — PROMETHAZINE HYDROCHLORIDE 25 MG/ML
25 INJECTION, SOLUTION INTRAMUSCULAR; INTRAVENOUS
Status: COMPLETED | OUTPATIENT
Start: 2022-02-01 | End: 2022-02-01

## 2022-02-01 RX ORDER — INSULIN GLARGINE 100 [IU]/ML
62 INJECTION, SOLUTION SUBCUTANEOUS NIGHTLY
COMMUNITY

## 2022-02-01 RX ORDER — HYDROMORPHONE HYDROCHLORIDE 2 MG/1
2 TABLET ORAL EVERY 6 HOURS PRN
Qty: 20 TABLET | Refills: 0 | Status: SHIPPED | OUTPATIENT
Start: 2022-02-01 | End: 2022-02-06

## 2022-02-01 RX ORDER — HYDROMORPHONE HYDROCHLORIDE 1 MG/ML
1 INJECTION, SOLUTION INTRAMUSCULAR; INTRAVENOUS; SUBCUTANEOUS
Status: COMPLETED | OUTPATIENT
Start: 2022-02-01 | End: 2022-02-01

## 2022-02-01 RX ORDER — ONDANSETRON 2 MG/ML
4 INJECTION INTRAMUSCULAR; INTRAVENOUS
Status: COMPLETED | OUTPATIENT
Start: 2022-02-01 | End: 2022-02-01

## 2022-02-01 RX ORDER — TAMSULOSIN HYDROCHLORIDE 0.4 MG/1
0.4 CAPSULE ORAL
Status: COMPLETED | OUTPATIENT
Start: 2022-02-01 | End: 2022-02-01

## 2022-02-01 RX ORDER — KETOROLAC TROMETHAMINE 30 MG/ML
15 INJECTION, SOLUTION INTRAMUSCULAR; INTRAVENOUS
Status: COMPLETED | OUTPATIENT
Start: 2022-02-01 | End: 2022-02-01

## 2022-02-01 RX ADMIN — SODIUM CHLORIDE 1000 ML: 9 INJECTION, SOLUTION INTRAVENOUS at 03:02

## 2022-02-01 RX ADMIN — HYDROMORPHONE HYDROCHLORIDE 1 MG: 1 INJECTION, SOLUTION INTRAMUSCULAR; INTRAVENOUS; SUBCUTANEOUS at 01:02

## 2022-02-01 RX ADMIN — CEFTRIAXONE SODIUM 1 G: 1 INJECTION, POWDER, FOR SOLUTION INTRAMUSCULAR; INTRAVENOUS at 05:02

## 2022-02-01 RX ADMIN — HYDROMORPHONE HYDROCHLORIDE 1 MG: 1 INJECTION, SOLUTION INTRAMUSCULAR; INTRAVENOUS; SUBCUTANEOUS at 03:02

## 2022-02-01 RX ADMIN — TAMSULOSIN HYDROCHLORIDE 0.4 MG: 0.4 CAPSULE ORAL at 03:02

## 2022-02-01 RX ADMIN — ONDANSETRON 4 MG: 2 INJECTION INTRAMUSCULAR; INTRAVENOUS at 01:02

## 2022-02-01 RX ADMIN — PROMETHAZINE HYDROCHLORIDE 25 MG: 25 INJECTION INTRAMUSCULAR; INTRAVENOUS at 02:02

## 2022-02-01 RX ADMIN — SODIUM CHLORIDE 1000 ML: 9 INJECTION, SOLUTION INTRAVENOUS at 01:02

## 2022-02-01 RX ADMIN — ONDANSETRON 4 MG: 2 INJECTION INTRAMUSCULAR; INTRAVENOUS at 02:02

## 2022-02-01 RX ADMIN — KETOROLAC TROMETHAMINE 15 MG: 30 INJECTION, SOLUTION INTRAMUSCULAR at 01:02

## 2022-02-01 NOTE — ED PROVIDER NOTES
Encounter Date: 2022       History     Chief Complaint   Patient presents with    Flank Pain     C/O FLANK PAIN STARTING ABOUT 30 MIN PTA-H/O KIDNEY STONES     Patient presents with left flank that started approximately 30 minutes prior to arrival.  She states it feels like a kidney stone which she has had in the past, the last 1 was about 5 years ago.  Pain is constant but waxes and wanes.  She has had nausea and vomiting with the pain.  No fever.  No hematuria, no dysuria.        Review of patient's allergies indicates:   Allergen Reactions    Corticosteroids (glucocorticoids)      Past Medical History:   Diagnosis Date    Arthritis     Diabetes mellitus, type 2     Hypertension      Past Surgical History:   Procedure Laterality Date     SECTION      CHOLECYSTECTOMY      KNEE ARTHROSCOPY W/ MENISCECTOMY Left 2021    Procedure: ARTHROSCOPY, KNEE, WITH MENISCECTOMY;  Surgeon: Manuel Cruz MD;  Location: [facility];  Service: Orthopedics;  Laterality: Left;    REPAIR OF MENISCUS OF KNEE Left 2021    Procedure: REPAIR, MENISCUS, KNEE;  Surgeon: Manuel Cruz MD;  Location: [facility];  Service: Orthopedics;  Laterality: Left;    SPINE SURGERY      TUBAL LIGATION       Family History   Problem Relation Age of Onset    Cancer Mother     Cancer Father      Social History     Tobacco Use    Smoking status: Never Smoker    Smokeless tobacco: Never Used   Substance Use Topics    Alcohol use: Not Currently    Drug use: Never     Comment: Prescribed by Fabby Sheffield     Review of Systems   Constitutional: Negative.    HENT: Negative.    Eyes: Negative.    Respiratory: Negative.    Cardiovascular: Negative.    Gastrointestinal: Positive for abdominal pain (Has had left flank pain for 30 minutes, acute onset.), nausea and vomiting. Negative for abdominal distention, blood in stool, constipation and diarrhea.   Genitourinary: Positive for flank pain. Negative for decreased  urine volume, difficulty urinating, dysuria, frequency and hematuria.   Skin: Negative.    Neurological: Negative.    Psychiatric/Behavioral: Negative.    All other systems reviewed and are negative.      Physical Exam     Initial Vitals [02/01/22 1335]   BP Pulse Resp Temp SpO2   111/74 87 (!) 22 97.8 °F (36.6 °C) 100 %      MAP       --         Physical Exam    Nursing note and vitals reviewed.  Constitutional: She appears well-developed and well-nourished. She is not diaphoretic. No distress.   HENT:   Head: Normocephalic.   Right Ear: External ear normal.   Left Ear: External ear normal.   Nose: Nose normal.   Mouth/Throat: Oropharynx is clear and moist. No oropharyngeal exudate.   Eyes: Conjunctivae and EOM are normal. Pupils are equal, round, and reactive to light. Right eye exhibits no discharge. Left eye exhibits no discharge. No scleral icterus.   Neck: Neck supple. No tracheal deviation present. No JVD present.   Normal range of motion.  Cardiovascular: Normal rate, regular rhythm, normal heart sounds and intact distal pulses.   No murmur heard.  Pulmonary/Chest: Breath sounds normal. No stridor. No respiratory distress. She has no wheezes. She has no rhonchi. She has no rales.   Abdominal: Abdomen is soft. Bowel sounds are normal. She exhibits no distension. There is abdominal tenderness (Patient has exquisite left flank/CVA tenderness on percussion.).   No other abdominal tenderness besides the left CVA tenderness.   Musculoskeletal:         General: No tenderness ( no tenderness of the extremities upper and lower.) or edema. Normal range of motion.      Cervical back: Normal range of motion and neck supple.     Lymphadenopathy:     She has no cervical adenopathy.   Neurological: She is alert and oriented to person, place, and time. She has normal strength. No cranial nerve deficit. GCS score is 15. GCS eye subscore is 4. GCS verbal subscore is 5. GCS motor subscore is 6.   Skin: Skin is warm and dry.  Capillary refill takes 2 to 3 seconds. No rash noted. No erythema. No pallor.   Psychiatric: She has a normal mood and affect. Her behavior is normal.         Medical Screening Exam   See Full Note    ED Course   Procedures  Labs Reviewed   URINALYSIS, REFLEX TO URINE CULTURE - Abnormal; Notable for the following components:       Result Value    Clarity, UA Slightly Cloudy (*)     Leukocytes, UA Trace (*)     Protein,   (*)     Glucose,   (*)     Ketones, UA 40  (*)     Blood, UA Moderate (*)     All other components within normal limits   URINALYSIS, MICROSCOPIC - Abnormal; Notable for the following components:    WBC, UA 5-10 (*)     RBC, UA 10-15 (*)     Bacteria, UA Few (*)     Squamous Epithelial Cells, UA Moderate (*)     All other components within normal limits          Imaging Results          X-Ray Abdomen AP 1 View (KUB) (Final result)  Result time 02/01/22 15:03:39    Final result by Salomón Ramesh MD (02/01/22 15:03:39)                 Impression:      As above.      Electronically signed by: Salomón Ramesh  Date:    02/01/2022  Time:    15:03             Narrative:    EXAMINATION:  XR ABDOMEN AP 1 VIEW    CLINICAL HISTORY:  Calculus of ureter    TECHNIQUE:  AP View(s) of the abdomen was performed.    COMPARISON:  CT 02/01/2022    FINDINGS:  There is a calcification of the right renal shadow.  Additional calcifications seen on the CT on the right renal shadow are better seen by CT.  There is a calcification over the left L4 level psoas muscle stripe corresponding to the ureteral calculus on the left.  Additional stones over the left renal shadow are seen.  Mild to moderate colonic stool.                               CT Renal Stone Study ABD Pelvis WO (Final result)  Result time 02/01/22 14:31:40    Final result by Sofiya Sidhu MD (02/01/22 14:31:40)                 Impression:      1. Sequelae of obstruction at the level of a 3-4 mm calculus in the proximal left ureter  2. Bilateral  nephrolithiasis  3. Prior cholecystectomy  4. Mild diverticulosis  This CT exam was performed using one or more of the following dose reduction techniques: Automated exposure control, adjustment of the mA and/or kV according to patient's size, or use of iterative reconstruction technique.      Electronically signed by: Sofiya Sidhu  Date:    02/01/2022  Time:    14:31             Narrative:    EXAMINATION:  CT RENAL STONE STUDY ABD PELVIS WO    CLINICAL HISTORY:  Flank pain, kidney stone suspected;    FINDINGS:  There are clips in the gallbladder fossa.  There are several bilateral 1-4 mm renal calculi present.  There is mild dilatation left renal collecting system to the level of a a 3-4 mm calculus in the proximal left ureter with mild stranding in the perinephric and renal sinus fat on the left    Incidental note made of a retroaortic left renal vein.  No enlarged retroperitoneal nodes seen    Bowel is unopacified limiting visualization    Pelvis:    Sigmoid diverticuli present without free fluid or focal inflammatory changes seen.                                 Medications   cefTRIAXone (ROCEPHIN) 1 g in dextrose 5 % in water (D5W) 5 % 50 mL IVPB (MB+) (has no administration in time range)   sodium chloride 0.9% bolus 1,000 mL (0 mLs Intravenous Stopped 2/1/22 1514)   ketorolac injection 15 mg (15 mg Intravenous Given 2/1/22 1349)   ondansetron injection 4 mg (4 mg Intravenous Given 2/1/22 1351)   HYDROmorphone injection 1 mg (1 mg Intravenous Given 2/1/22 1357)   ondansetron injection 4 mg (4 mg Intravenous Given 2/1/22 1445)   promethazine injection 25 mg (25 mg Intramuscular Given 2/1/22 1445)   sodium chloride 0.9% bolus 1,000 mL (0 mLs Intravenous Stopped 2/1/22 1617)   HYDROmorphone injection 1 mg (1 mg Intravenous Given 2/1/22 1518)   tamsulosin 24 hr capsule 0.4 mg (0.4 mg Oral Given 2/1/22 1549)     Medical Decision Making:   Independently Interpreted Test(s):   I have ordered and independently  interpreted X-rays - see summary below.       <> Summary of X-Ray Reading(s): 4 mm stone noted left mid ureter with hydronephrosis.  Clinical Tests:   Radiological Study: Reviewed  ED Management:  Patient was treated with IV fluids IV Zofran and multiple doses of IV Dilaudid after IV Toradol was ineffective.  Patient has adequate pain control at this time and is stable for discharge home with follow-up to be scheduled with urologist.  Radiologist report reviewed, there is a 3-4 mm stone in the left mid ureter with hydronephrosis.  Incidentally noted is also some diverticulosis.                 Clinical Impression:   Final diagnoses:  [N20.1] Ureterolithiasis (Primary)  [N39.0, A49.9] Bacterial urinary infection          ED Disposition Condition    Discharge Stable        ED Prescriptions     Medication Sig Dispense Start Date End Date Auth. Provider    HYDROmorphone (DILAUDID) 2 MG tablet Take 1 tablet (2 mg total) by mouth every 6 (six) hours as needed for Pain (As needed for kidney stone pain). 20 tablet 2/1/2022 2/6/2022 Rafi Wilson DO    ondansetron (ZOFRAN-ODT) 4 MG TbDL Take 1 tablet (4 mg total) by mouth every 6 (six) hours as needed (Nausea). 20 tablet 2/1/2022 2/6/2022 Rafi Wilson DO        Follow-up Information     Follow up With Specialties Details Why Contact Info    Abimbola Fischer MD Family Medicine Schedule an appointment as soon as possible for a visit in 3 days To recheck, and for referral to Urology if the stone does not pass. [address] [facility] [address]  [phone]             Rafi Wilson DO  02/01/22 1706       Rafi Wilson DO  02/01/22 5670

## 2022-02-02 ENCOUNTER — TELEPHONE (OUTPATIENT)
Dept: EMERGENCY MEDICINE | Facility: HOSPITAL | Age: 67
End: 2022-02-02
Payer: MEDICARE

## 2022-02-17 ENCOUNTER — OFFICE VISIT (OUTPATIENT)
Dept: ORTHOPEDICS | Facility: CLINIC | Age: 67
End: 2022-02-17
Payer: MEDICARE

## 2022-02-17 DIAGNOSIS — M19.91 PRIMARY LOCALIZED OSTEOARTHRITIS: ICD-10-CM

## 2022-02-17 DIAGNOSIS — M17.12 OSTEOARTHRITIS OF LEFT KNEE: ICD-10-CM

## 2022-02-17 DIAGNOSIS — Z01.810 PREOP CARDIOVASCULAR EXAM: ICD-10-CM

## 2022-02-17 DIAGNOSIS — M17.12 PRIMARY OSTEOARTHRITIS OF LEFT KNEE: Primary | ICD-10-CM

## 2022-02-17 DIAGNOSIS — Z01.812 PRE-PROCEDURE LAB EXAM: ICD-10-CM

## 2022-02-17 PROCEDURE — 99213 PR OFFICE/OUTPT VISIT, EST, LEVL III, 20-29 MIN: ICD-10-PCS | Mod: ,,, | Performed by: ORTHOPAEDIC SURGERY

## 2022-02-17 PROCEDURE — 99213 OFFICE O/P EST LOW 20 MIN: CPT | Mod: ,,, | Performed by: ORTHOPAEDIC SURGERY

## 2022-02-17 RX ORDER — SODIUM CHLORIDE 9 MG/ML
INJECTION, SOLUTION INTRAVENOUS CONTINUOUS
Status: CANCELLED | OUTPATIENT
Start: 2022-02-17

## 2022-02-17 RX ORDER — MUPIROCIN 20 MG/G
OINTMENT TOPICAL
Status: CANCELLED | OUTPATIENT
Start: 2022-02-17

## 2022-02-17 NOTE — PROGRESS NOTES
ASSESSMENT:      ICD-10-CM ICD-9-CM   1. Primary osteoarthritis of left knee  M17.12 715.16       PLAN:     -Findings and treatment options were discussed with the patient  -All questions answered  Natural history and expected course discussed. Questions answered.  Educational materials distributed.  Rest, ice, compression, and elevation (RICE) therapy.     The conservative options including NSAIDs, activity modification, physical therapy, corticosteroid injection, and viscosupplimentation were discussed. She is interested in surgical intervention at this time.    The surgical process of knee replacement was discussed in detail with the patient including a detailed discussion of the procedure itself (including visual model, x-ray review, and literature review). The typical perioperative and post-operative course was discussed and perioperative risks were discussed to the patient's satisfaction.  Risks and complications discussed included but were not limited to the risks of anesthetic complications, infection, bleeding, wound healing complications, aseptic loosening, instability, limb length inequality, neurologic dysfunction including numbness,  DVT, pulmonary embolism, perioperative medical risks (cardiac, pulmonary, renal, neurologic), and death and the patient elects to proceed. We will initiate pre-operative medical evaluation and clearance and set a provisional date for surgical intervention according to the patient's schedule.   I have discussed anticoagulation with aspirin and coumadin and in low risk patients I have recommended aspirin twice a day.  25 minutes was spent in direct consultation with the patient counselling her on the items listed above.      There are no Patient Instructions on file for this visit.    IMAGING:        CC:  Knee pain    66 y.o. Female returns to clinic for a follow up visit regarding knee pain. Pt states she continue to have knee pain since her surgery,she would to discuss  TKA.     Treatment to date has included:  surgery         REVIEW OF SYSTEMS:   Constitution: Negative. Negative for chills, fever and night sweats.    Hematologic/Lymphatic: Negative for bleeding problem. Does not bruise/bleed easily.   Skin: Negative for dry skin, itching and rash.   Musculoskeletal: Negative for falls. Positive for knee pain and muscle weakness.     All other review of symptoms were reviewed and found to be noncontributory.     PAST MEDICAL HISTORY:   Past Medical History:   Diagnosis Date    Arthritis     Diabetes mellitus, type 2     Hypertension        PAST SURGICAL HISTORY:   Past Surgical History:   Procedure Laterality Date     SECTION      CHOLECYSTECTOMY      KNEE ARTHROSCOPY W/ MENISCECTOMY Left 2021    Procedure: ARTHROSCOPY, KNEE, WITH MENISCECTOMY;  Surgeon: Manuel Cruz MD;  Location: [facility];  Service: Orthopedics;  Laterality: Left;    REPAIR OF MENISCUS OF KNEE Left 2021    Procedure: REPAIR, MENISCUS, KNEE;  Surgeon: Manuel Cruz MD;  Location: [facility];  Service: Orthopedics;  Laterality: Left;    SPINE SURGERY      TUBAL LIGATION         FAMILY HISTORY:   Family History   Problem Relation Age of Onset    Cancer Mother     Cancer Father        SOCIAL HISTORY:   Social History     Socioeconomic History    Marital status:    Tobacco Use    Smoking status: Never Smoker    Smokeless tobacco: Never Used   Substance and Sexual Activity    Alcohol use: Not Currently    Drug use: Never     Comment: Prescribed by Fabby Sheffield    Sexual activity: Yes       MEDICATIONS:     Current Outpatient Medications:     celecoxib (CELEBREX) 200 MG capsule, Take 2 capsules by mouth 2 (two) times a day., Disp: , Rfl:     insulin (LANTUS SOLOSTAR U-100 INSULIN) glargine 100 units/mL (3mL) SubQ pen, 62 Units by abdominal subcutaneous route every evening., Disp: , Rfl:     LANTUS SOLOSTAR U-100 INSULIN glargine 100 units/mL (3mL) SubQ pen,  Inject 62 Units into the skin every evening., Disp: , Rfl:     lisinopriL (PRINIVIL,ZESTRIL) 20 MG tablet, Take 40 mg by mouth once daily., Disp: , Rfl:     lisinopriL 10 MG tablet, Take 10 mg by mouth once daily., Disp: , Rfl:     Current Facility-Administered Medications:     hyaluronate sodium, stabilized (MONOVISC) Syrg, , Intra-articular, 1 time in Clinic/HOD, Manuel Cruz MD    ALLERGIES:   Review of patient's allergies indicates:   Allergen Reactions    Corticosteroids (glucocorticoids)         PHYSICAL EXAMINATION:  There were no vitals taken for this visit.  General    Nursing note and vitals reviewed.  Constitutional: She is oriented to person, place, and time. She appears well-developed and well-nourished.   HENT:   Head: Normocephalic and atraumatic.   Nose: Nose normal.   Eyes: Pupils are equal, round, and reactive to light.   Neck: Neck supple.   Cardiovascular: Normal rate, regular rhythm and intact distal pulses.    Pulmonary/Chest: Effort normal. No respiratory distress. She exhibits no tenderness.   Abdominal: Soft. She exhibits no distension. There is no abdominal tenderness.   Neurological: She is alert and oriented to person, place, and time. She has normal reflexes. She displays normal reflexes. No cranial nerve deficit. She exhibits normal muscle tone.   Psychiatric: She has a normal mood and affect. Her behavior is normal. Judgment and thought content normal.     General Musculoskeletal Exam   Gait: antalgic       Right Knee Exam   Right knee exam is normal.    Inspection   Swelling: present  Effusion: present  Deformity: present    Tenderness   The patient is tender to palpation of the medial joint line.    Crepitus   The patient has crepitus of the patella and medial joint line.    Range of Motion   Extension: abnormal   Flexion: abnormal     Tests   Meniscus   Robert:  Medial - positive   Ligament Examination Lachman: normal (-1 to 2mm) PCL-Posterior Drawer: normal (0 to 2mm)     MCL  - Valgus: normal (0 to 2mm)  LCL - Varus: normalPivot Shift: normal (Equal)Reverse Pivot Shift: normal (Equal)Dial Test at 30 degrees: normal (< 5 degrees)Dial Test at 90 degrees: normal (< 5 degrees)  Posterior Sag Test: negative  Posterolateral Corner: unstable (>15 degrees difference)  Patella   Patellar Tracking: normal  Q-Angle at 90 degrees: normal  Patellar Grind: positive    Other   Sensation: normal    Left Knee Exam     Inspection   Deformity: absent    Tenderness   The patient tender to palpation of the medial joint line and lateral joint line.    Crepitus   The patient has crepitus of the patella.    Range of Motion   Extension: normal   Flexion: abnormal     Tests   Meniscus   Robert:  Medial - positive Lateral - positive  Stability Lachman: normal (-1 to 2mm) PCL-Posterior Drawer: normal (0 to 2mm)  MCL - Valgus: normal (0 to 2mm)  LCL - Varus: normal (0 to 2mm)  Posterior Sag Test: negative  Patella   Passive Patellar Tilt: lateral tilt  Patellar Tracking: normal  Patellar Grind: positive  J-Sign: J sign absent    Other   Muscle Tightness: hamstring tightness  Sensation: normal    Muscle Strength   Right Lower Extremity   Quadriceps:  5/5   Hamstrin/5   Left Lower Extremity   Hip Abduction: 5/5   Quadriceps:  5/5   Hamstrin/5     Reflexes     Left Side  Quadriceps:  2+    Right Side   Quadriceps:  2+    Vascular Exam     Right Pulses  Dorsalis Pedis:      2+  Posterior Tibial:      2+                No orders of the defined types were placed in this encounter.        Procedures

## 2022-03-11 DIAGNOSIS — Z71.89 COMPLEX CARE COORDINATION: ICD-10-CM

## 2022-04-19 ENCOUNTER — HOSPITAL ENCOUNTER (OUTPATIENT)
Dept: RADIOLOGY | Facility: HOSPITAL | Age: 67
Discharge: HOME | End: 2022-04-19
Attending: ORTHOPAEDIC SURGERY
Payer: MEDICARE

## 2022-04-19 ENCOUNTER — OFFICE VISIT (OUTPATIENT)
Dept: INTERNAL MEDICINE | Facility: CLINIC | Age: 67
End: 2022-04-19
Payer: MEDICARE

## 2022-04-19 ENCOUNTER — OFFICE VISIT (OUTPATIENT)
Dept: ORTHOPEDICS | Facility: CLINIC | Age: 67
End: 2022-04-19
Payer: MEDICARE

## 2022-04-19 VITALS
SYSTOLIC BLOOD PRESSURE: 180 MMHG | BODY MASS INDEX: 29.99 KG/M2 | HEIGHT: 65 IN | RESPIRATION RATE: 16 BRPM | WEIGHT: 180 LBS | DIASTOLIC BLOOD PRESSURE: 80 MMHG | HEART RATE: 77 BPM | OXYGEN SATURATION: 97 %

## 2022-04-19 DIAGNOSIS — M17.11 PRIMARY OSTEOARTHRITIS OF RIGHT KNEE: Primary | ICD-10-CM

## 2022-04-19 DIAGNOSIS — Z01.810 PREOP CARDIOVASCULAR EXAM: ICD-10-CM

## 2022-04-19 DIAGNOSIS — E08.65 DIABETES MELLITUS DUE TO UNDERLYING CONDITION WITH HYPERGLYCEMIA: ICD-10-CM

## 2022-04-19 DIAGNOSIS — E11.9 TYPE 2 DIABETES MELLITUS WITHOUT COMPLICATION, WITH LONG-TERM CURRENT USE OF INSULIN: ICD-10-CM

## 2022-04-19 DIAGNOSIS — I10 ESSENTIAL HYPERTENSION: ICD-10-CM

## 2022-04-19 DIAGNOSIS — Z01.818 PREOP EXAMINATION: Primary | ICD-10-CM

## 2022-04-19 DIAGNOSIS — E21.0 PRIMARY HYPERPARATHYROIDISM: ICD-10-CM

## 2022-04-19 DIAGNOSIS — Z01.818 PREPROCEDURAL EXAMINATION: ICD-10-CM

## 2022-04-19 DIAGNOSIS — Z87.442 HISTORY OF KIDNEY STONES: ICD-10-CM

## 2022-04-19 DIAGNOSIS — Z79.4 TYPE 2 DIABETES MELLITUS WITHOUT COMPLICATION, WITH LONG-TERM CURRENT USE OF INSULIN: ICD-10-CM

## 2022-04-19 PROCEDURE — 99213 OFFICE O/P EST LOW 20 MIN: CPT | Mod: ,,, | Performed by: ORTHOPAEDIC SURGERY

## 2022-04-19 PROCEDURE — 71046 XR CHEST PA AND LATERAL: ICD-10-PCS | Mod: 26,,, | Performed by: RADIOLOGY

## 2022-04-19 PROCEDURE — 71046 X-RAY EXAM CHEST 2 VIEWS: CPT | Mod: TC

## 2022-04-19 PROCEDURE — 99205 OFFICE O/P NEW HI 60 MIN: CPT | Mod: S$PBB,,, | Performed by: INTERNAL MEDICINE

## 2022-04-19 PROCEDURE — 71046 X-RAY EXAM CHEST 2 VIEWS: CPT | Mod: 26,,, | Performed by: RADIOLOGY

## 2022-04-19 PROCEDURE — 99214 OFFICE O/P EST MOD 30 MIN: CPT | Mod: PBBFAC,25 | Performed by: INTERNAL MEDICINE

## 2022-04-19 PROCEDURE — 99205 PR OFFICE/OUTPT VISIT, NEW, LEVL V, 60-74 MIN: ICD-10-PCS | Mod: S$PBB,,, | Performed by: INTERNAL MEDICINE

## 2022-04-19 PROCEDURE — 99213 PR OFFICE/OUTPT VISIT, EST, LEVL III, 20-29 MIN: ICD-10-PCS | Mod: ,,, | Performed by: ORTHOPAEDIC SURGERY

## 2022-04-19 RX ORDER — AMLODIPINE BESYLATE 5 MG/1
5 TABLET ORAL DAILY
Qty: 90 TABLET | Refills: 0 | Status: SHIPPED | OUTPATIENT
Start: 2022-04-19 | End: 2022-06-22

## 2022-04-19 RX ORDER — NAPROXEN 500 MG/1
500 TABLET ORAL 2 TIMES DAILY WITH MEALS
Qty: 60 TABLET | Refills: 3 | Status: ON HOLD | OUTPATIENT
Start: 2022-04-19 | End: 2022-09-14 | Stop reason: ALTCHOICE

## 2022-04-19 NOTE — PROGRESS NOTES
CC:  Knee pain    66 y.o. Female returns to clinic for a follow up visit regarding knee pain.       she is here today for her visit before surgery    She was seen today for preoperative evaluation.  She does have hyperparathyroidism.  Has not had a hemoglobin A1c performed lately.       Past Medical History:   Diagnosis Date    Arthritis     Diabetes mellitus, type 2     Hypertension      Past Surgical History:   Procedure Laterality Date     SECTION      CHOLECYSTECTOMY      KNEE ARTHROSCOPY W/ MENISCECTOMY Left 2021    Procedure: ARTHROSCOPY, KNEE, WITH MENISCECTOMY;  Surgeon: Manuel Cruz MD;  Location: [facility];  Service: Orthopedics;  Laterality: Left;    REPAIR OF MENISCUS OF KNEE Left 2021    Procedure: REPAIR, MENISCUS, KNEE;  Surgeon: Manuel Cruz MD;  Location: [facility];  Service: Orthopedics;  Laterality: Left;    SPINE SURGERY      TUBAL LIGATION           REVIEW OF SYSTEMS:   Constitution: Negative. Negative for chills, fever and night sweats.    Hematologic/Lymphatic: Negative for bleeding problem. Does not bruise/bleed easily.   Skin: Negative for dry skin, itching and rash.   Musculoskeletal: Negative for falls. Positive for knee pain and muscle weakness.   All other review of symptoms were reviewed and found to be noncontributory.     PHYSICAL EXAMINATION:  There were no vitals taken for this visit.  General    Nursing note and vitals reviewed.  Constitutional: She is oriented to person, place, and time. She appears well-developed and well-nourished.   HENT:   Head: Normocephalic and atraumatic.   Nose: Nose normal.   Eyes: Pupils are equal, round, and reactive to light.   Neck: Neck supple.   Cardiovascular: Normal rate, regular rhythm and intact distal pulses.    Pulmonary/Chest: Effort normal. No respiratory distress. She exhibits no tenderness.   Abdominal: Soft. She exhibits no distension. There is no abdominal tenderness.   Neurological:  She is alert and oriented to person, place, and time. She has normal reflexes.   Psychiatric: She has a normal mood and affect. Her behavior is normal. Judgment and thought content normal.     General Musculoskeletal Exam   Gait: antalgic       Right Knee Exam     Inspection   Swelling: present  Effusion: present  Deformity: present    Tenderness   The patient is tender to palpation of the medial joint line.    Crepitus   The patient has crepitus of the patella and medial joint line.    Range of Motion   Extension: abnormal   Flexion: abnormal     Tests   Meniscus   Robert:  Medial - positive   Ligament Examination Lachman: normal (-1 to 2mm) PCL-Posterior Drawer: normal (0 to 2mm)     MCL - Valgus: normal (0 to 2mm)  LCL - Varus: normalPivot Shift: normal (Equal)Reverse Pivot Shift: normal (Equal)Dial Test at 30 degrees: normal (< 5 degrees)Dial Test at 90 degrees: normal (< 5 degrees)  Posterior Sag Test: negative  Posterolateral Corner: unstable (>15 degrees difference)  Patella   Patellar Tracking: normal  Q-Angle at 90 degrees: normal  Patellar Grind: positive    Other   Sensation: normal    Muscle Strength   Right Lower Extremity   Quadriceps:  5/5   Hamstrin/5     Reflexes     Right Side   Quadriceps:  2+    Vascular Exam     Right Pulses  Dorsalis Pedis:      2+  Posterior Tibial:      2+              IMAGING:    ASSESSMENT:      ICD-10-CM ICD-9-CM   1. Primary osteoarthritis of right knee  M17.11 715.16   2. Preprocedural examination  Z01.818 V72.84   3. Diabetes mellitus due to underlying condition with hyperglycemia   E08.65 249.80       PLAN:     -Findings and treatment options were discussed with the patient  -All questions answered  Natural history and expected course discussed. Questions answered.  Educational materials distributed.  Rest, ice, compression, and elevation (RICE) therapy.  We are going to order hemoglobin A1c today to determine if she is suitable for surgery next week or 2.  If  her hemoglobin A1c is elevated may consider delaying surgery.  In regards to her hyperparathyroidism I think she would be suitable to proceed with surgical intervention as long as she has primary care clearance.    There are no Patient Instructions on file for this visit.      Orders Placed This Encounter   Procedures    Hemoglobin A1C         Procedures

## 2022-04-19 NOTE — PROGRESS NOTES
Subjective:       Patient ID: Jonna Cunha is a 66 y.o. female.    Chief Complaint: Pre-op Exam (Surgery Clearance:Left TKR Dr. Manuel Cruz)    The patient is a 66-year-old white female the presents today for surgical preop risk stratification.  She has a history of osteoarthritis, diabetes mellitus type 2, hypertension, and hypercalcemia.  She denies any history of coronary artery disease, cerebrovascular disease, CHF, or valvular heart disease.  She denies any chest pain at rest or with exertion.  Her hypercalcemia was found incidentally and workup reveals primary hyper parathyroidism.  She was referred to Endocrinology but canceled the appointment.  She did this because she wanted to try to have her knee fixed 1st.  Today she comes in her blood pressure is elevated at 1 80/80.  She tells me that her blood pressure tends to run high.  Her last A1c was 8.2%.  Currently she is only on Lantus 62 units at bedtime.  She has been on metformin in the past.  Her highest blood sugar over the last 4 weeks was around 300 in the lowest was around 150. She is currently resting comfortably today in no distress.      Review of Systems   Constitutional: Negative for appetite change, chills, fatigue and fever.   HENT: Negative for nasal congestion, ear pain, hearing loss, sinus pressure/congestion and sore throat.    Eyes: Negative for pain, redness and visual disturbance.   Respiratory: Negative for apnea, cough, shortness of breath and wheezing.    Cardiovascular: Negative for chest pain and palpitations.   Gastrointestinal: Negative for abdominal pain, constipation, diarrhea and nausea.   Endocrine: Negative for cold intolerance, heat intolerance and polyuria.   Genitourinary: Negative for dysuria and hematuria.   Musculoskeletal: Positive for arthralgias. Negative for back pain, joint swelling, myalgias and neck pain.   Integumentary:  Negative for pallor, rash and wound.   Allergic/Immunologic: Negative for immunocompromised  state.   Neurological: Negative for tremors, seizures, weakness, headaches and memory loss.   Hematological: Negative for adenopathy.   Psychiatric/Behavioral: Negative for confusion, dysphoric mood and sleep disturbance. The patient is not nervous/anxious.          Objective:      Physical Exam  Vitals and nursing note reviewed.   Constitutional:       General: She is not in acute distress.     Appearance: Normal appearance. She is not ill-appearing.   HENT:      Head: Normocephalic and atraumatic.      Right Ear: External ear normal.      Left Ear: External ear normal.      Nose: Nose normal.      Mouth/Throat:      Pharynx: Oropharynx is clear.   Eyes:      Extraocular Movements: Extraocular movements intact.      Conjunctiva/sclera: Conjunctivae normal.      Pupils: Pupils are equal, round, and reactive to light.   Neck:      Vascular: No carotid bruit.   Cardiovascular:      Rate and Rhythm: Normal rate and regular rhythm.      Pulses: Normal pulses.      Heart sounds: Normal heart sounds. No murmur heard.  Pulmonary:      Effort: No respiratory distress.      Breath sounds: Normal breath sounds. No wheezing or rales.   Abdominal:      General: Bowel sounds are normal.      Palpations: Abdomen is soft.   Musculoskeletal:         General: Normal range of motion.      Cervical back: Normal range of motion and neck supple.      Right lower leg: No edema.      Left lower leg: No edema.   Skin:     General: Skin is warm and dry.      Capillary Refill: Capillary refill takes less than 2 seconds.      Coloration: Skin is not pale.   Neurological:      General: No focal deficit present.      Mental Status: She is alert and oriented to person, place, and time.      Cranial Nerves: No cranial nerve deficit.   Psychiatric:         Mood and Affect: Mood normal.         Judgment: Judgment normal.         Assessment:       Problem List Items Addressed This Visit        Cardiac/Vascular    Essential hypertension        Renal/    History of kidney stones       Endocrine    Type 2 diabetes mellitus without complication, with long-term current use of insulin    Primary hyperparathyroidism      Other Visit Diagnoses     Preop examination    -  Primary          Plan:       1. Left knee pain-patient presents for surgical preop risk stratification.  No known history of coronary artery disease, cerebrovascular disease, CHF, or valvular heart disease.  She denies any chest pain at rest or with exertion.  However her blood pressure is uncontrolled.  It is 180/80 today and she states that tends to run around there.  Also her most recent A1c is 8.2%.  She was recently diagnosed with primary hyperparathyroidism.  She canceled her appointment with Endocrinology because she wanted to have her knee done 1st.  I have spoken with Dr. Cruz.  We are going to try to optimize the patient's blood pressure and blood sugar prior to proceeding to surgery.  She also needs to have her primary hyperparathyroid evaluated.  From my standpoint once blood pressure and blood sugar her better controlled she can proceed to surgery.    2. Hypertension uncontrolled-she is currently on ACE-inhibitor.  I am going to add amlodipine to the patient.  She needs to follow up with her primary care provider.  Her target blood pressure should be less than 130/80.    3. Diabetes mellitus type 2-A1c is 8.2%.  This can interfere with healing.  The patient needs to be better controlled.  She was unable to tolerate metformin in the past.  She will continue with her Lantus 62 units at bedtime and I have added Rybelsus 3 mg daily.  Hopefully this will help with her blood sugar.  She needs to follow up with her primary care provider for management    4. Primary hyperparathyroidism-calcium is been elevated.  It was 10.7 and her PTH was elevated at 108.2.  This falls in the range of primary hyperparathyroidism.  She was referred to Endocrinology but canceled the appointment.  Based on  the fact that she has multiple kidney stones recommendations would be for parathyroidectomy.  I recommend she be seen by Endocrinology.

## 2022-05-20 ENCOUNTER — TELEPHONE (OUTPATIENT)
Dept: PULMONOLOGY | Facility: CLINIC | Age: 67
End: 2022-05-20
Payer: MEDICARE

## 2022-05-20 NOTE — TELEPHONE ENCOUNTER
rec'd voicemail from patient asking that Dr Foss send in rx for Rybelus. He gave her a sample during surgery clearance appt. Note reflects patient is to f/u with her PCP for management of diabetes as this was preop surgery clearance visit. Voicemail left for patient with these directions.

## 2022-08-24 ENCOUNTER — HOSPITAL ENCOUNTER (OUTPATIENT)
Dept: RADIOLOGY | Facility: HOSPITAL | Age: 67
Discharge: HOME | End: 2022-08-24
Attending: NURSE PRACTITIONER
Payer: MEDICARE

## 2022-08-24 ENCOUNTER — OFFICE VISIT (OUTPATIENT)
Dept: ORTHOPEDICS | Facility: CLINIC | Age: 67
End: 2022-08-24
Payer: MEDICARE

## 2022-08-24 DIAGNOSIS — Z01.818 PREPROCEDURAL EXAMINATION: Primary | ICD-10-CM

## 2022-08-24 DIAGNOSIS — M17.12 PRIMARY OSTEOARTHRITIS OF LEFT KNEE: Primary | ICD-10-CM

## 2022-08-24 DIAGNOSIS — M17.12 PRIMARY OSTEOARTHRITIS OF LEFT KNEE: ICD-10-CM

## 2022-08-24 PROCEDURE — 73564 XR KNEE COMP 4 OR MORE VIEWS LEFT: ICD-10-PCS | Mod: 26,LT,, | Performed by: RADIOLOGY

## 2022-08-24 PROCEDURE — 99213 PR OFFICE/OUTPT VISIT, EST, LEVL III, 20-29 MIN: ICD-10-PCS | Mod: ,,, | Performed by: NURSE PRACTITIONER

## 2022-08-24 PROCEDURE — 73564 X-RAY EXAM KNEE 4 OR MORE: CPT | Mod: TC,LT

## 2022-08-24 PROCEDURE — 73564 X-RAY EXAM KNEE 4 OR MORE: CPT | Mod: 26,LT,, | Performed by: RADIOLOGY

## 2022-08-24 PROCEDURE — 99213 OFFICE O/P EST LOW 20 MIN: CPT | Mod: ,,, | Performed by: NURSE PRACTITIONER

## 2022-08-24 RX ORDER — SODIUM CHLORIDE 9 MG/ML
INJECTION, SOLUTION INTRAVENOUS CONTINUOUS
Status: CANCELLED | OUTPATIENT
Start: 2022-08-24

## 2022-08-24 RX ORDER — MUPIROCIN 20 MG/G
OINTMENT TOPICAL
Status: CANCELLED | OUTPATIENT
Start: 2022-08-24

## 2022-08-24 NOTE — PROGRESS NOTES
CC:  Knee pain    67 y.o. Female returns to clinic for a follow up visit regarding knee pain.       Patient states that she is in constant pain with her knee. She was previously scheduled for surgery, total knee arthroplasty, and had to postpone due to other health problems. Her Hgb A1c was elevated. She has been working on her diet and in on medication to control her blood glucose. She is ready to reschedule surgery.   Today she would like to discuss treatment moving forward and the possibility of rescheduling surgery.       Past Medical History:   Diagnosis Date    Arthritis     Diabetes mellitus, type 2     Hypertension      Past Surgical History:   Procedure Laterality Date     SECTION      CHOLECYSTECTOMY      KNEE ARTHROSCOPY W/ MENISCECTOMY Left 2021    Procedure: ARTHROSCOPY, KNEE, WITH MENISCECTOMY;  Surgeon: Manuel Cruz MD;  Location: [facility];  Service: Orthopedics;  Laterality: Left;    REPAIR OF MENISCUS OF KNEE Left 2021    Procedure: REPAIR, MENISCUS, KNEE;  Surgeon: Manuel Cruz MD;  Location: [facility];  Service: Orthopedics;  Laterality: Left;    SPINE SURGERY      TUBAL LIGATION           REVIEW OF SYSTEMS:   Constitution: Negative. Negative for chills, fever and night sweats.    Hematologic/Lymphatic: Negative for bleeding problem. Does not bruise/bleed easily.   Skin: Negative for dry skin, itching and rash.   Musculoskeletal: Negative for falls. Positive for knee pain and muscle weakness.   All other review of symptoms were reviewed and found to be noncontributory.     PHYSICAL EXAMINATION:  There were no vitals taken for this visit.  General    Constitutional: She is oriented to person, place, and time. She appears well-nourished.   HENT:   Head: Normocephalic and atraumatic.   Eyes: Pupils are equal, round, and reactive to light.   Neck: Neck supple.   Cardiovascular: Normal rate and regular rhythm.    Pulmonary/Chest: Effort normal. No  respiratory distress.   Abdominal: There is no abdominal tenderness. There is no guarding.   Neurological: She is alert and oriented to person, place, and time. She has normal reflexes.   Psychiatric: She has a normal mood and affect. Her behavior is normal. Judgment and thought content normal.           Right Knee Exam     Tests   Patella   Patellar Grind: positive    Left Knee Exam     Inspection   Swelling: present  Effusion: present    Tenderness   The patient tender to palpation of the medial joint line and lateral joint line.    Crepitus   The patient has crepitus of the patella.    Range of Motion   Extension: normal   Flexion: abnormal     Tests   Meniscus   Robert:  Medial - positive   Stability Lachman: normal (-1 to 2mm) PCL-Posterior Drawer: normal (0 to 2mm)  Patella   Patellar Tracking: normal  Patellar Grind: positive    Other   Sensation: normal    Muscle Strength   Right Lower Extremity   Quadriceps:  5/5   Hamstrin/5   Left Lower Extremity   Quadriceps:  5/5   Hamstrin/5         IMAGING:  No results found.     Xray left knee     FINDINGS:  No fracture.  No dislocation.  Mild-to-moderate tricompartmental osteoarthritic changes are seen.  Os ossific density noted posterior to the knee possibly an osteochondral body.  There is no joint effusion.     Impression:     Mild-to-moderate tricompartmental osteoarthritic changes similar prior  ASSESSMENT:      ICD-10-CM ICD-9-CM   1. Preprocedural examination  Z01.818 V72.84   2. Primary osteoarthritis of left knee  M17.12 715.16       PLAN:     -Findings and treatment options were discussed with the patient  -All questions answered  Natural history and expected course discussed. Questions answered.  Educational materials distributed.  Reduction in offending activity.  OTC analgesics as needed.  Hgb A1C  Will set up for surgery clearance    The conservative options including NSAIDs, activity modification, physical therapy, corticosteroid  injection, and viscosupplimentation were discussed. She is interested in surgical intervention at this time.    The surgical process of knee replacement was discussed in detail with the patient including a detailed discussion of the procedure itself (including visual model, x-ray review, and literature review). The typical perioperative and post-operative course was discussed and perioperative risks were discussed to the patient's satisfaction.  Risks and complications discussed included but were not limited to the risks of anesthetic complications, infection, bleeding, wound healing complications, aseptic loosening, instability, limb length inequality, neurologic dysfunction including numbness,  DVT, pulmonary embolism, perioperative medical risks (cardiac, pulmonary, renal, neurologic), and death and the patient elects to proceed. We will initiate pre-operative medical evaluation and clearance and set a provisional date for surgical intervention according to the patient's schedule.   I have discussed anticoagulation with aspirin and coumadin and in low risk patients I have recommended aspirin twice a day.  25 minutes was spent in direct consultation with the patient counselling her on the items listed above.      There are no Patient Instructions on file for this visit.      Orders Placed This Encounter   Procedures    X-Ray Knee Complete 4 or More Views Left    X-Ray Knee 3 View Left    Hemoglobin A1C         Procedures

## 2022-09-08 ENCOUNTER — OFFICE VISIT (OUTPATIENT)
Dept: ORTHOPEDICS | Facility: CLINIC | Age: 67
End: 2022-09-08
Payer: MEDICARE

## 2022-09-08 DIAGNOSIS — Z01.818 PREPROCEDURAL EXAMINATION: Primary | ICD-10-CM

## 2022-09-08 DIAGNOSIS — Z01.818 PREPROCEDURAL EXAMINATION: ICD-10-CM

## 2022-09-08 DIAGNOSIS — M17.12 PRIMARY OSTEOARTHRITIS OF LEFT KNEE: Primary | ICD-10-CM

## 2022-09-08 PROCEDURE — 99214 PR OFFICE/OUTPT VISIT, EST, LEVL IV, 30-39 MIN: ICD-10-PCS | Mod: ,,, | Performed by: ORTHOPAEDIC SURGERY

## 2022-09-08 PROCEDURE — 99214 OFFICE O/P EST MOD 30 MIN: CPT | Mod: ,,, | Performed by: ORTHOPAEDIC SURGERY

## 2022-09-08 NOTE — PROGRESS NOTES
CC:  Knee pain    67 y.o. Female returns to clinic for a follow up visit regarding knee pain.       Patient is here today to discuss her upcoming procedure.        Past Medical History:   Diagnosis Date    Arthritis     Diabetes mellitus, type 2     Hypertension      Past Surgical History:   Procedure Laterality Date     SECTION      CHOLECYSTECTOMY      KNEE ARTHROSCOPY W/ MENISCECTOMY Left 2021    Procedure: ARTHROSCOPY, KNEE, WITH MENISCECTOMY;  Surgeon: Manuel Cruz MD;  Location: [facility];  Service: Orthopedics;  Laterality: Left;    REPAIR OF MENISCUS OF KNEE Left 2021    Procedure: REPAIR, MENISCUS, KNEE;  Surgeon: Manuel Cruz MD;  Location: [facility];  Service: Orthopedics;  Laterality: Left;    SPINE SURGERY      TUBAL LIGATION           REVIEW OF SYSTEMS:   Constitution: Negative. Negative for chills, fever and night sweats.    Hematologic/Lymphatic: Negative for bleeding problem. Does not bruise/bleed easily.   Skin: Negative for dry skin, itching and rash.   Musculoskeletal: Negative for falls. Positive for knee pain and muscle weakness.   All other review of symptoms were reviewed and found to be noncontributory.     PHYSICAL EXAMINATION:  There were no vitals taken for this visit.  General    Constitutional: She is oriented to person, place, and time. She appears well-developed and well-nourished.   HENT:   Head: Normocephalic and atraumatic.   Eyes: Pupils are equal, round, and reactive to light.   Neck: Neck supple.   Cardiovascular:  Normal rate, regular rhythm and intact distal pulses.            Pulmonary/Chest: Effort normal. No respiratory distress. She exhibits no tenderness.   Abdominal: Soft. There is no abdominal tenderness.   Neurological: She is alert and oriented to person, place, and time. She has normal reflexes. She displays normal reflexes. No cranial nerve deficit. She exhibits normal muscle tone.   Psychiatric: She has a normal mood and  affect. Her behavior is normal. Judgment and thought content normal.           Right Knee Exam   Right knee exam is normal.    Left Knee Exam     Inspection   Deformity: absent    Tenderness   The patient tender to palpation of the medial joint line and lateral joint line.    Crepitus   The patient has crepitus of the patella.    Range of Motion   Extension:  normal   Flexion:  abnormal     Tests   Meniscus   Robert:  Medial - positive Lateral - positive  Stability   Lachman: normal (-1 to 2mm)   PCL-Posterior Drawer: normal (0 to 2mm)  MCL - Valgus: normal (0 to 2mm)  LCL - Varus: normal (0 to 2mm)  Posterior Sag Test: negative  Patella   Passive Patellar Tilt: lateral tilt  Patellar Tracking: normal  Patellar Grind: positive  J-Sign: J sign absent    Other   Muscle Tightness: hamstring tightness  Sensation: normal    Muscle Strength   Left Lower Extremity   Hip Abduction: 5/5   Quadriceps:  5/5   Hamstrin/5     Reflexes     Left Side  Quadriceps:  2+      IMAGING:  X-Ray Knee Complete 4 or More Views Left    Result Date: 2022  EXAMINATION: XR KNEE COMP 4 OR MORE VIEWS LEFT CLINICAL HISTORY: Unilateral primary osteoarthritis, left knee TECHNIQUE: AP, lateral, tunnel, and sunrise view left knee COMPARISON: 2021 FINDINGS: No fracture.  No dislocation.  Mild-to-moderate tricompartmental osteoarthritic changes are seen.  Os ossific density noted posterior to the knee possibly an osteochondral body.  There is no joint effusion.     Mild-to-moderate tricompartmental osteoarthritic changes similar prior. Electronically signed by: Salomón Ramesh Date:    2022 Time:    14:14       ASSESSMENT:      ICD-10-CM ICD-9-CM   1. Primary osteoarthritis of left knee  M17.12 715.16   2. Preprocedural examination  Z01.818 V72.84       PLAN:     -Findings and treatment options were discussed with the patient  -All questions answered  Her hemoglobin A1c is in a much better position.  This to be suitable for surgery.   Will get the necessary preoperative labs will plan for surgery in about 1 week's time.  There are no Patient Instructions on file for this visit.      Orders Placed This Encounter   Procedures    Urinalysis         Procedures

## 2022-09-08 NOTE — H&P (VIEW-ONLY)
CC:  Knee pain    67 y.o. Female returns to clinic for a follow up visit regarding knee pain.       Patient is here today to discuss her upcoming procedure.        Past Medical History:   Diagnosis Date    Arthritis     Diabetes mellitus, type 2     Hypertension      Past Surgical History:   Procedure Laterality Date     SECTION      CHOLECYSTECTOMY      KNEE ARTHROSCOPY W/ MENISCECTOMY Left 2021    Procedure: ARTHROSCOPY, KNEE, WITH MENISCECTOMY;  Surgeon: Manuel Cruz MD;  Location: [facility];  Service: Orthopedics;  Laterality: Left;    REPAIR OF MENISCUS OF KNEE Left 2021    Procedure: REPAIR, MENISCUS, KNEE;  Surgeon: Manuel Cruz MD;  Location: [facility];  Service: Orthopedics;  Laterality: Left;    SPINE SURGERY      TUBAL LIGATION           REVIEW OF SYSTEMS:   Constitution: Negative. Negative for chills, fever and night sweats.    Hematologic/Lymphatic: Negative for bleeding problem. Does not bruise/bleed easily.   Skin: Negative for dry skin, itching and rash.   Musculoskeletal: Negative for falls. Positive for knee pain and muscle weakness.   All other review of symptoms were reviewed and found to be noncontributory.     PHYSICAL EXAMINATION:  There were no vitals taken for this visit.  General    Constitutional: She is oriented to person, place, and time. She appears well-developed and well-nourished.   HENT:   Head: Normocephalic and atraumatic.   Eyes: Pupils are equal, round, and reactive to light.   Neck: Neck supple.   Cardiovascular:  Normal rate, regular rhythm and intact distal pulses.            Pulmonary/Chest: Effort normal. No respiratory distress. She exhibits no tenderness.   Abdominal: Soft. There is no abdominal tenderness.   Neurological: She is alert and oriented to person, place, and time. She has normal reflexes. She displays normal reflexes. No cranial nerve deficit. She exhibits normal muscle tone.   Psychiatric: She has a normal mood and  affect. Her behavior is normal. Judgment and thought content normal.           Right Knee Exam   Right knee exam is normal.    Left Knee Exam     Inspection   Deformity: absent    Tenderness   The patient tender to palpation of the medial joint line and lateral joint line.    Crepitus   The patient has crepitus of the patella.    Range of Motion   Extension:  normal   Flexion:  abnormal     Tests   Meniscus   Robert:  Medial - positive Lateral - positive  Stability   Lachman: normal (-1 to 2mm)   PCL-Posterior Drawer: normal (0 to 2mm)  MCL - Valgus: normal (0 to 2mm)  LCL - Varus: normal (0 to 2mm)  Posterior Sag Test: negative  Patella   Passive Patellar Tilt: lateral tilt  Patellar Tracking: normal  Patellar Grind: positive  J-Sign: J sign absent    Other   Muscle Tightness: hamstring tightness  Sensation: normal    Muscle Strength   Left Lower Extremity   Hip Abduction: 5/5   Quadriceps:  5/5   Hamstrin/5     Reflexes     Left Side  Quadriceps:  2+      IMAGING:  X-Ray Knee Complete 4 or More Views Left    Result Date: 2022  EXAMINATION: XR KNEE COMP 4 OR MORE VIEWS LEFT CLINICAL HISTORY: Unilateral primary osteoarthritis, left knee TECHNIQUE: AP, lateral, tunnel, and sunrise view left knee COMPARISON: 2021 FINDINGS: No fracture.  No dislocation.  Mild-to-moderate tricompartmental osteoarthritic changes are seen.  Os ossific density noted posterior to the knee possibly an osteochondral body.  There is no joint effusion.     Mild-to-moderate tricompartmental osteoarthritic changes similar prior. Electronically signed by: Salomón Ramesh Date:    2022 Time:    14:14       ASSESSMENT:      ICD-10-CM ICD-9-CM   1. Primary osteoarthritis of left knee  M17.12 715.16   2. Preprocedural examination  Z01.818 V72.84       PLAN:     -Findings and treatment options were discussed with the patient  -All questions answered  Her hemoglobin A1c is in a much better position.  This to be suitable for surgery.   Will get the necessary preoperative labs will plan for surgery in about 1 week's time.  There are no Patient Instructions on file for this visit.      Orders Placed This Encounter   Procedures    Urinalysis         Procedures

## 2022-09-14 ENCOUNTER — HOSPITAL ENCOUNTER (OUTPATIENT)
Facility: HOSPITAL | Age: 67
Discharge: NURSING, REHAB OR LONG-TERM CARE | End: 2022-09-15
Attending: ORTHOPAEDIC SURGERY | Admitting: ORTHOPAEDIC SURGERY
Payer: MEDICARE

## 2022-09-14 ENCOUNTER — ANESTHESIA (OUTPATIENT)
Dept: SURGERY | Facility: HOSPITAL | Age: 67
End: 2022-09-14
Payer: MEDICARE

## 2022-09-14 ENCOUNTER — ANESTHESIA EVENT (OUTPATIENT)
Dept: SURGERY | Facility: HOSPITAL | Age: 67
End: 2022-09-14
Payer: MEDICARE

## 2022-09-14 DIAGNOSIS — M17.12 PRIMARY OSTEOARTHRITIS OF LEFT KNEE: Primary | ICD-10-CM

## 2022-09-14 PROBLEM — E13.9 DIABETES 1.5, MANAGED AS TYPE 2: Status: RESOLVED | Noted: 2021-06-30 | Resolved: 2022-09-14

## 2022-09-14 LAB
GLUCOSE SERPL-MCNC: 101 MG/DL (ref 70–105)
GLUCOSE SERPL-MCNC: 114 MG/DL (ref 70–105)
GLUCOSE SERPL-MCNC: 117 MG/DL (ref 70–105)
GLUCOSE SERPL-MCNC: 138 MG/DL (ref 70–105)

## 2022-09-14 PROCEDURE — D9220A PRA ANESTHESIA: ICD-10-PCS | Mod: CRNA,,, | Performed by: NURSE ANESTHETIST, CERTIFIED REGISTERED

## 2022-09-14 PROCEDURE — 37000008 HC ANESTHESIA 1ST 15 MINUTES: Performed by: ORTHOPAEDIC SURGERY

## 2022-09-14 PROCEDURE — 97161 PT EVAL LOW COMPLEX 20 MIN: CPT

## 2022-09-14 PROCEDURE — 25000003 PHARM REV CODE 250: Performed by: ANESTHESIOLOGY

## 2022-09-14 PROCEDURE — 63600175 PHARM REV CODE 636 W HCPCS: Performed by: ORTHOPAEDIC SURGERY

## 2022-09-14 PROCEDURE — D9220A PRA ANESTHESIA: ICD-10-PCS | Mod: ANES,,, | Performed by: ANESTHESIOLOGY

## 2022-09-14 PROCEDURE — 37000009 HC ANESTHESIA EA ADD 15 MINS: Performed by: ORTHOPAEDIC SURGERY

## 2022-09-14 PROCEDURE — 63600175 PHARM REV CODE 636 W HCPCS: Performed by: ANESTHESIOLOGY

## 2022-09-14 PROCEDURE — 99214 PR OFFICE/OUTPT VISIT, EST, LEVL IV, 30-39 MIN: ICD-10-PCS | Mod: ,,, | Performed by: STUDENT IN AN ORGANIZED HEALTH CARE EDUCATION/TRAINING PROGRAM

## 2022-09-14 PROCEDURE — 71000033 HC RECOVERY, INTIAL HOUR: Performed by: ORTHOPAEDIC SURGERY

## 2022-09-14 PROCEDURE — C1713 ANCHOR/SCREW BN/BN,TIS/BN: HCPCS | Performed by: ORTHOPAEDIC SURGERY

## 2022-09-14 PROCEDURE — 76942 ECHO GUIDE FOR BIOPSY: CPT | Mod: 59 | Performed by: ANESTHESIOLOGY

## 2022-09-14 PROCEDURE — 20985 PR CPTR-ASST SURGICAL NAVIGATION IMAGE-LESS: ICD-10-PCS | Mod: LT,,, | Performed by: ORTHOPAEDIC SURGERY

## 2022-09-14 PROCEDURE — C1776 JOINT DEVICE (IMPLANTABLE): HCPCS | Performed by: ORTHOPAEDIC SURGERY

## 2022-09-14 PROCEDURE — 36000712 HC OR TIME LEV V 1ST 15 MIN: Performed by: ORTHOPAEDIC SURGERY

## 2022-09-14 PROCEDURE — 20985 CPTR-ASST DIR MS PX: CPT | Mod: LT,,, | Performed by: ORTHOPAEDIC SURGERY

## 2022-09-14 PROCEDURE — 27000177 HC AIRWAY, LARYNGEAL MASK: Performed by: NURSE ANESTHETIST, CERTIFIED REGISTERED

## 2022-09-14 PROCEDURE — 25000003 PHARM REV CODE 250: Performed by: ORTHOPAEDIC SURGERY

## 2022-09-14 PROCEDURE — 27200155 *HC SET PRIMARY NONVENTED: Performed by: NURSE ANESTHETIST, CERTIFIED REGISTERED

## 2022-09-14 PROCEDURE — 27000655: Performed by: NURSE ANESTHETIST, CERTIFIED REGISTERED

## 2022-09-14 PROCEDURE — 27000716 HC OXISENSOR PROBE, ANY SIZE: Performed by: NURSE ANESTHETIST, CERTIFIED REGISTERED

## 2022-09-14 PROCEDURE — 27201960 HC SPINAL TRAY: Performed by: NURSE ANESTHETIST, CERTIFIED REGISTERED

## 2022-09-14 PROCEDURE — 27447 PR TOTAL KNEE ARTHROPLASTY: ICD-10-PCS | Mod: LT,,, | Performed by: ORTHOPAEDIC SURGERY

## 2022-09-14 PROCEDURE — 63600175 PHARM REV CODE 636 W HCPCS: Performed by: NURSE ANESTHETIST, CERTIFIED REGISTERED

## 2022-09-14 PROCEDURE — 63600175 PHARM REV CODE 636 W HCPCS: Performed by: STUDENT IN AN ORGANIZED HEALTH CARE EDUCATION/TRAINING PROGRAM

## 2022-09-14 PROCEDURE — D9220A PRA ANESTHESIA: Mod: CRNA,,, | Performed by: NURSE ANESTHETIST, CERTIFIED REGISTERED

## 2022-09-14 PROCEDURE — 64447 PR NERVE BLOCK INJ, ANES/STEROID, FEMORAL, INCL IMAG GUIDANCE: ICD-10-PCS | Mod: ,,, | Performed by: ANESTHESIOLOGY

## 2022-09-14 PROCEDURE — 27200750 HC INSULATED NEEDLE/ STIMUPLEX: Performed by: NURSE ANESTHETIST, CERTIFIED REGISTERED

## 2022-09-14 PROCEDURE — 25000003 PHARM REV CODE 250: Performed by: NURSE ANESTHETIST, CERTIFIED REGISTERED

## 2022-09-14 PROCEDURE — D9220A PRA ANESTHESIA: Mod: ANES,,, | Performed by: ANESTHESIOLOGY

## 2022-09-14 PROCEDURE — 97165 OT EVAL LOW COMPLEX 30 MIN: CPT

## 2022-09-14 PROCEDURE — 27447 TOTAL KNEE ARTHROPLASTY: CPT | Mod: LT,,, | Performed by: ORTHOPAEDIC SURGERY

## 2022-09-14 PROCEDURE — 99214 OFFICE O/P EST MOD 30 MIN: CPT | Mod: ,,, | Performed by: STUDENT IN AN ORGANIZED HEALTH CARE EDUCATION/TRAINING PROGRAM

## 2022-09-14 PROCEDURE — 82962 GLUCOSE BLOOD TEST: CPT | Mod: 91

## 2022-09-14 PROCEDURE — 64447 NJX AA&/STRD FEMORAL NRV IMG: CPT | Mod: ,,, | Performed by: ANESTHESIOLOGY

## 2022-09-14 PROCEDURE — 36000713 HC OR TIME LEV V EA ADD 15 MIN: Performed by: ORTHOPAEDIC SURGERY

## 2022-09-14 PROCEDURE — 27201423 OPTIME MED/SURG SUP & DEVICES STERILE SUPPLY: Performed by: ORTHOPAEDIC SURGERY

## 2022-09-14 DEVICE — CEMENT BONE SMARTSET HV 40 GR.: Type: IMPLANTABLE DEVICE | Site: KNEE | Status: FUNCTIONAL

## 2022-09-14 DEVICE — PATELLA ATTUNE MEDLZD DOME 35: Type: IMPLANTABLE DEVICE | Site: KNEE | Status: FUNCTIONAL

## 2022-09-14 DEVICE — IMPLANTABLE DEVICE: Type: IMPLANTABLE DEVICE | Site: KNEE | Status: FUNCTIONAL

## 2022-09-14 RX ORDER — PHENYLEPHRINE HYDROCHLORIDE 10 MG/ML
INJECTION INTRAVENOUS
Status: DISCONTINUED | OUTPATIENT
Start: 2022-09-14 | End: 2022-09-14

## 2022-09-14 RX ORDER — POLYETHYLENE GLYCOL 3350 17 G/17G
17 POWDER, FOR SOLUTION ORAL DAILY
Status: DISCONTINUED | OUTPATIENT
Start: 2022-09-14 | End: 2022-09-15 | Stop reason: HOSPADM

## 2022-09-14 RX ORDER — DIPHENHYDRAMINE HYDROCHLORIDE 50 MG/ML
INJECTION INTRAMUSCULAR; INTRAVENOUS
Status: DISCONTINUED | OUTPATIENT
Start: 2022-09-14 | End: 2022-09-14

## 2022-09-14 RX ORDER — PROPOFOL 10 MG/ML
VIAL (ML) INTRAVENOUS
Status: DISCONTINUED | OUTPATIENT
Start: 2022-09-14 | End: 2022-09-14

## 2022-09-14 RX ORDER — CEFAZOLIN SODIUM 1 G/3ML
INJECTION, POWDER, FOR SOLUTION INTRAMUSCULAR; INTRAVENOUS
Status: DISCONTINUED | OUTPATIENT
Start: 2022-09-14 | End: 2022-09-14

## 2022-09-14 RX ORDER — LOPERAMIDE HYDROCHLORIDE 2 MG/1
4 CAPSULE ORAL ONCE
Status: COMPLETED | OUTPATIENT
Start: 2022-09-14 | End: 2022-09-14

## 2022-09-14 RX ORDER — DOCUSATE SODIUM 100 MG/1
100 CAPSULE, LIQUID FILLED ORAL EVERY 12 HOURS
Status: DISCONTINUED | OUTPATIENT
Start: 2022-09-14 | End: 2022-09-15

## 2022-09-14 RX ORDER — OLMESARTAN MEDOXOMIL 40 MG/1
40 TABLET ORAL DAILY
COMMUNITY

## 2022-09-14 RX ORDER — DIPHENHYDRAMINE HYDROCHLORIDE 50 MG/ML
25 INJECTION INTRAMUSCULAR; INTRAVENOUS EVERY 6 HOURS PRN
Status: DISCONTINUED | OUTPATIENT
Start: 2022-09-14 | End: 2022-09-14 | Stop reason: HOSPADM

## 2022-09-14 RX ORDER — MUPIROCIN 20 MG/G
1 OINTMENT TOPICAL 2 TIMES DAILY
Status: DISCONTINUED | OUTPATIENT
Start: 2022-09-14 | End: 2022-09-15 | Stop reason: HOSPADM

## 2022-09-14 RX ORDER — METOPROLOL TARTRATE 25 MG/1
25 TABLET, FILM COATED ORAL 2 TIMES DAILY
Status: DISCONTINUED | OUTPATIENT
Start: 2022-09-14 | End: 2022-09-15 | Stop reason: HOSPADM

## 2022-09-14 RX ORDER — ONDANSETRON 2 MG/ML
4 INJECTION INTRAMUSCULAR; INTRAVENOUS DAILY PRN
Status: DISCONTINUED | OUTPATIENT
Start: 2022-09-14 | End: 2022-09-14 | Stop reason: HOSPADM

## 2022-09-14 RX ORDER — EPHEDRINE SULFATE 50 MG/ML
INJECTION, SOLUTION INTRAVENOUS
Status: DISCONTINUED | OUTPATIENT
Start: 2022-09-14 | End: 2022-09-14

## 2022-09-14 RX ORDER — MUPIROCIN 20 MG/G
OINTMENT TOPICAL
Status: DISCONTINUED | OUTPATIENT
Start: 2022-09-14 | End: 2022-09-14 | Stop reason: HOSPADM

## 2022-09-14 RX ORDER — HYDROMORPHONE HYDROCHLORIDE 2 MG/ML
0.5 INJECTION, SOLUTION INTRAMUSCULAR; INTRAVENOUS; SUBCUTANEOUS ONCE AS NEEDED
Status: COMPLETED | OUTPATIENT
Start: 2022-09-14 | End: 2022-09-14

## 2022-09-14 RX ORDER — IBUPROFEN 200 MG
16 TABLET ORAL
Status: DISCONTINUED | OUTPATIENT
Start: 2022-09-14 | End: 2022-09-15 | Stop reason: HOSPADM

## 2022-09-14 RX ORDER — CELECOXIB 100 MG/1
200 CAPSULE ORAL 2 TIMES DAILY
Status: DISCONTINUED | OUTPATIENT
Start: 2022-09-14 | End: 2022-09-15 | Stop reason: HOSPADM

## 2022-09-14 RX ORDER — AMLODIPINE BESYLATE 5 MG/1
5 TABLET ORAL DAILY
Status: DISCONTINUED | OUTPATIENT
Start: 2022-09-14 | End: 2022-09-15 | Stop reason: HOSPADM

## 2022-09-14 RX ORDER — CEFAZOLIN SODIUM 2 G/50ML
2 SOLUTION INTRAVENOUS
Status: COMPLETED | OUTPATIENT
Start: 2022-09-14 | End: 2022-09-15

## 2022-09-14 RX ORDER — ONDANSETRON 2 MG/ML
INJECTION INTRAMUSCULAR; INTRAVENOUS
Status: DISCONTINUED | OUTPATIENT
Start: 2022-09-14 | End: 2022-09-14

## 2022-09-14 RX ORDER — ROPIVACAINE HYDROCHLORIDE 7.5 MG/ML
INJECTION, SOLUTION EPIDURAL; PERINEURAL
Status: COMPLETED | OUTPATIENT
Start: 2022-09-14 | End: 2022-09-14

## 2022-09-14 RX ORDER — ORAL SEMAGLUTIDE 3 MG/1
7 TABLET ORAL DAILY
Status: ON HOLD | COMMUNITY
End: 2022-09-30 | Stop reason: DRUGHIGH

## 2022-09-14 RX ORDER — NEBIVOLOL 5 MG/1
10 TABLET ORAL DAILY
COMMUNITY

## 2022-09-14 RX ORDER — SODIUM CHLORIDE 9 MG/ML
INJECTION, SOLUTION INTRAVENOUS CONTINUOUS
Status: DISCONTINUED | OUTPATIENT
Start: 2022-09-14 | End: 2022-09-14

## 2022-09-14 RX ORDER — MIDAZOLAM HYDROCHLORIDE 1 MG/ML
INJECTION INTRAMUSCULAR; INTRAVENOUS
Status: DISCONTINUED | OUTPATIENT
Start: 2022-09-14 | End: 2022-09-14

## 2022-09-14 RX ORDER — LIDOCAINE HYDROCHLORIDE 20 MG/ML
INJECTION, SOLUTION EPIDURAL; INFILTRATION; INTRACAUDAL; PERINEURAL
Status: DISCONTINUED | OUTPATIENT
Start: 2022-09-14 | End: 2022-09-14

## 2022-09-14 RX ORDER — ONDANSETRON 4 MG/1
8 TABLET, ORALLY DISINTEGRATING ORAL EVERY 8 HOURS PRN
Status: DISCONTINUED | OUTPATIENT
Start: 2022-09-14 | End: 2022-09-15 | Stop reason: HOSPADM

## 2022-09-14 RX ORDER — NAPROXEN SODIUM 220 MG/1
325 TABLET, FILM COATED ORAL DAILY
Status: DISCONTINUED | OUTPATIENT
Start: 2022-09-15 | End: 2022-09-15 | Stop reason: HOSPADM

## 2022-09-14 RX ORDER — OXYCODONE HYDROCHLORIDE 5 MG/1
10 TABLET ORAL EVERY 4 HOURS PRN
Status: DISCONTINUED | OUTPATIENT
Start: 2022-09-14 | End: 2022-09-15

## 2022-09-14 RX ORDER — BUPIVACAINE HYDROCHLORIDE 7.5 MG/ML
INJECTION, SOLUTION EPIDURAL; RETROBULBAR
Status: COMPLETED | OUTPATIENT
Start: 2022-09-14 | End: 2022-09-14

## 2022-09-14 RX ORDER — SODIUM CHLORIDE 0.9 % (FLUSH) 0.9 %
10 SYRINGE (ML) INJECTION
Status: DISCONTINUED | OUTPATIENT
Start: 2022-09-14 | End: 2022-09-15 | Stop reason: HOSPADM

## 2022-09-14 RX ORDER — MORPHINE SULFATE 10 MG/ML
4 INJECTION INTRAMUSCULAR; INTRAVENOUS; SUBCUTANEOUS EVERY 5 MIN PRN
Status: DISCONTINUED | OUTPATIENT
Start: 2022-09-14 | End: 2022-09-14 | Stop reason: HOSPADM

## 2022-09-14 RX ORDER — MEPERIDINE HYDROCHLORIDE 25 MG/ML
25 INJECTION INTRAMUSCULAR; INTRAVENOUS; SUBCUTANEOUS ONCE AS NEEDED
Status: DISCONTINUED | OUTPATIENT
Start: 2022-09-14 | End: 2022-09-14 | Stop reason: HOSPADM

## 2022-09-14 RX ORDER — HYDROMORPHONE HYDROCHLORIDE 2 MG/ML
0.5 INJECTION, SOLUTION INTRAMUSCULAR; INTRAVENOUS; SUBCUTANEOUS EVERY 5 MIN PRN
Status: DISCONTINUED | OUTPATIENT
Start: 2022-09-14 | End: 2022-09-14 | Stop reason: HOSPADM

## 2022-09-14 RX ORDER — VALSARTAN 320 MG/1
320 TABLET ORAL DAILY
Status: DISCONTINUED | OUTPATIENT
Start: 2022-09-14 | End: 2022-09-15 | Stop reason: HOSPADM

## 2022-09-14 RX ORDER — SODIUM CHLORIDE 0.9 % (FLUSH) 0.9 %
3 SYRINGE (ML) INJECTION EVERY 6 HOURS PRN
Status: DISCONTINUED | OUTPATIENT
Start: 2022-09-14 | End: 2022-09-15 | Stop reason: HOSPADM

## 2022-09-14 RX ORDER — PREGABALIN 75 MG/1
75 CAPSULE ORAL 2 TIMES DAILY
Status: DISCONTINUED | OUTPATIENT
Start: 2022-09-14 | End: 2022-09-15 | Stop reason: HOSPADM

## 2022-09-14 RX ORDER — GLUCAGON 1 MG
1 KIT INJECTION
Status: DISCONTINUED | OUTPATIENT
Start: 2022-09-14 | End: 2022-09-15 | Stop reason: HOSPADM

## 2022-09-14 RX ORDER — OXYCODONE HYDROCHLORIDE 5 MG/1
5 TABLET ORAL EVERY 4 HOURS PRN
Status: DISCONTINUED | OUTPATIENT
Start: 2022-09-14 | End: 2022-09-15 | Stop reason: HOSPADM

## 2022-09-14 RX ORDER — IBUPROFEN 200 MG
24 TABLET ORAL
Status: DISCONTINUED | OUTPATIENT
Start: 2022-09-14 | End: 2022-09-15 | Stop reason: HOSPADM

## 2022-09-14 RX ORDER — BISACODYL 10 MG
10 SUPPOSITORY, RECTAL RECTAL DAILY PRN
Status: DISCONTINUED | OUTPATIENT
Start: 2022-09-14 | End: 2022-09-15 | Stop reason: HOSPADM

## 2022-09-14 RX ORDER — INSULIN ASPART 100 [IU]/ML
1-10 INJECTION, SOLUTION INTRAVENOUS; SUBCUTANEOUS
Status: DISCONTINUED | OUTPATIENT
Start: 2022-09-14 | End: 2022-09-15 | Stop reason: HOSPADM

## 2022-09-14 RX ORDER — FAMOTIDINE 20 MG/1
20 TABLET, FILM COATED ORAL 2 TIMES DAILY
Status: DISCONTINUED | OUTPATIENT
Start: 2022-09-14 | End: 2022-09-15 | Stop reason: HOSPADM

## 2022-09-14 RX ORDER — DIAZEPAM 5 MG/1
5 TABLET ORAL ONCE
Status: COMPLETED | OUTPATIENT
Start: 2022-09-14 | End: 2022-09-14

## 2022-09-14 RX ORDER — IPRATROPIUM BROMIDE AND ALBUTEROL SULFATE 2.5; .5 MG/3ML; MG/3ML
3 SOLUTION RESPIRATORY (INHALATION) ONCE AS NEEDED
Status: DISCONTINUED | OUTPATIENT
Start: 2022-09-14 | End: 2022-09-15 | Stop reason: HOSPADM

## 2022-09-14 RX ORDER — FENTANYL CITRATE 50 UG/ML
INJECTION, SOLUTION INTRAMUSCULAR; INTRAVENOUS
Status: DISCONTINUED | OUTPATIENT
Start: 2022-09-14 | End: 2022-09-14

## 2022-09-14 RX ORDER — ZOLPIDEM TARTRATE 5 MG/1
5 TABLET ORAL NIGHTLY PRN
Status: DISCONTINUED | OUTPATIENT
Start: 2022-09-14 | End: 2022-09-15 | Stop reason: HOSPADM

## 2022-09-14 RX ORDER — ACETAMINOPHEN 325 MG/1
650 TABLET ORAL EVERY 4 HOURS PRN
Status: DISCONTINUED | OUTPATIENT
Start: 2022-09-14 | End: 2022-09-15 | Stop reason: HOSPADM

## 2022-09-14 RX ADMIN — PREGABALIN 75 MG: 75 CAPSULE ORAL at 09:09

## 2022-09-14 RX ADMIN — FENTANYL CITRATE 50 MCG: 50 INJECTION INTRAMUSCULAR; INTRAVENOUS at 07:09

## 2022-09-14 RX ADMIN — PREGABALIN 75 MG: 75 CAPSULE ORAL at 12:09

## 2022-09-14 RX ADMIN — CEFAZOLIN 2 G: 1 INJECTION, POWDER, FOR SOLUTION INTRAMUSCULAR; INTRAVENOUS; PARENTERAL at 08:09

## 2022-09-14 RX ADMIN — DOCUSATE SODIUM 100 MG: 100 CAPSULE, LIQUID FILLED ORAL at 09:09

## 2022-09-14 RX ADMIN — VANCOMYCIN HYDROCHLORIDE 1250 MG: 1 INJECTION, POWDER, LYOPHILIZED, FOR SOLUTION INTRAVENOUS at 08:09

## 2022-09-14 RX ADMIN — CEFAZOLIN 2 G: 10 INJECTION, POWDER, FOR SOLUTION INTRAVENOUS; PARENTERAL at 04:09

## 2022-09-14 RX ADMIN — BUPIVACAINE HYDROCHLORIDE 1.6 ML: 7.5 INJECTION, SOLUTION EPIDURAL; RETROBULBAR at 08:09

## 2022-09-14 RX ADMIN — EPHEDRINE SULFATE 10 MG: 50 INJECTION INTRAVENOUS at 08:09

## 2022-09-14 RX ADMIN — DOCUSATE SODIUM 100 MG: 100 CAPSULE, LIQUID FILLED ORAL at 12:09

## 2022-09-14 RX ADMIN — TRANEXAMIC ACID 1000 MG: 100 INJECTION, SOLUTION INTRAVENOUS at 09:09

## 2022-09-14 RX ADMIN — FAMOTIDINE 20 MG: 20 TABLET ORAL at 09:09

## 2022-09-14 RX ADMIN — LIDOCAINE HYDROCHLORIDE 20 MG: 20 INJECTION, SOLUTION EPIDURAL; INFILTRATION; INTRACAUDAL; PERINEURAL at 08:09

## 2022-09-14 RX ADMIN — CELECOXIB 200 MG: 100 CAPSULE ORAL at 09:09

## 2022-09-14 RX ADMIN — ROPIVACAINE HYDROCHLORIDE 15 ML: 7.5 INJECTION, SOLUTION EPIDURAL; PERINEURAL at 08:09

## 2022-09-14 RX ADMIN — ONDANSETRON 4 MG: 2 INJECTION INTRAMUSCULAR; INTRAVENOUS at 08:09

## 2022-09-14 RX ADMIN — PHENYLEPHRINE HYDROCHLORIDE 100 MCG: 10 INJECTION INTRAVENOUS at 10:09

## 2022-09-14 RX ADMIN — METOPROLOL TARTRATE 25 MG: 25 TABLET, FILM COATED ORAL at 09:09

## 2022-09-14 RX ADMIN — HYDROMORPHONE HYDROCHLORIDE 0.5 MG: 2 INJECTION INTRAMUSCULAR; INTRAVENOUS; SUBCUTANEOUS at 02:09

## 2022-09-14 RX ADMIN — LOPERAMIDE HYDROCHLORIDE 4 MG: 2 CAPSULE ORAL at 12:09

## 2022-09-14 RX ADMIN — TRANEXAMIC ACID 1000 MG: 100 INJECTION, SOLUTION INTRAVENOUS at 08:09

## 2022-09-14 RX ADMIN — FAMOTIDINE 20 MG: 20 TABLET ORAL at 12:09

## 2022-09-14 RX ADMIN — OXYCODONE HYDROCHLORIDE 10 MG: 5 TABLET ORAL at 11:09

## 2022-09-14 RX ADMIN — MUPIROCIN 1 G: 20 OINTMENT TOPICAL at 12:09

## 2022-09-14 RX ADMIN — MIDAZOLAM 2 MG: 1 INJECTION INTRAMUSCULAR; INTRAVENOUS at 07:09

## 2022-09-14 RX ADMIN — SODIUM CHLORIDE: 9 INJECTION, SOLUTION INTRAVENOUS at 07:09

## 2022-09-14 RX ADMIN — DIAZEPAM 5 MG: 5 TABLET ORAL at 07:09

## 2022-09-14 RX ADMIN — DIPHENHYDRAMINE HYDROCHLORIDE 12.5 MG: 50 INJECTION, SOLUTION INTRAMUSCULAR; INTRAVENOUS at 08:09

## 2022-09-14 RX ADMIN — VALSARTAN 320 MG: 320 TABLET, FILM COATED ORAL at 12:09

## 2022-09-14 RX ADMIN — ZOLPIDEM TARTRATE 5 MG: 5 TABLET, COATED ORAL at 09:09

## 2022-09-14 RX ADMIN — OXYCODONE HYDROCHLORIDE 10 MG: 5 TABLET ORAL at 07:09

## 2022-09-14 RX ADMIN — CELECOXIB 200 MG: 100 CAPSULE ORAL at 12:09

## 2022-09-14 RX ADMIN — FENTANYL CITRATE 50 MCG: 50 INJECTION INTRAMUSCULAR; INTRAVENOUS at 09:09

## 2022-09-14 RX ADMIN — PROPOFOL 110 MG: 10 INJECTION, EMULSION INTRAVENOUS at 08:09

## 2022-09-14 RX ADMIN — MUPIROCIN 1 G: 20 OINTMENT TOPICAL at 09:09

## 2022-09-14 RX ADMIN — ACETAMINOPHEN 650 MG: 325 TABLET ORAL at 05:09

## 2022-09-14 NOTE — ANESTHESIA PREPROCEDURE EVALUATION
09/14/2022  Jonna Cunha is a 67 y.o., female.      Pre-op Assessment    I have reviewed the Patient Summary Reports.    I have reviewed the NPO Status.   I have reviewed the Medications.     Review of Systems  Anesthesia Hx:  No problems with previous Anesthesia  Denies Family Hx of Anesthesia complications.   Denies Personal Hx of Anesthesia complications.   Social:  Non-Smoker, No Alcohol Use    Hematology/Oncology:  Hematology Normal   Oncology Normal     EENT/Dental:EENT/Dental Normal   Cardiovascular:   Hypertension hyperlipidemia    Pulmonary:  Pulmonary Normal    Renal/:   renal calculi    Hepatic/GI:  Hepatic/GI Normal    Musculoskeletal:   Arthritis     Neurological:  Neurology Normal    Endocrine:   Diabetes, type 2  Obesity / BMI > 30  Dermatological:  Skin Normal    Psych:  Psychiatric Normal           Physical Exam  General: Well nourished, Cooperative and Alert    Airway:  Mallampati: II   Mouth Opening: Normal  TM Distance: Normal  Tongue: Normal  Neck ROM: Normal ROM    Chest/Lungs:  Clear to auscultation, Normal Respiratory Rate    Heart:  Rate: Normal  Rhythm: Regular Rhythm        Chemistry        Component Value Date/Time     09/06/2022 0943    K 4.6 09/06/2022 0943     09/06/2022 0943    CO2 25 09/06/2022 0943    BUN 31 (H) 09/06/2022 0943    CREATININE 1.26 (H) 09/06/2022 0943     09/06/2022 0943        Component Value Date/Time    CALCIUM 11.3 (H) 09/06/2022 0943    ALKPHOS 90 09/06/2022 0943    AST 20 09/06/2022 0943    ALT 32 09/06/2022 0943    BILITOT 0.5 09/06/2022 0943    ESTGFRAFRICA 89 07/20/2020 1515    EGFRNONAA 73 04/19/2022 0920        Lab Results   Component Value Date    WBC 7.30 09/06/2022    RBC 4.51 09/06/2022    HGB 13.3 09/06/2022    MCV 86.0 09/06/2022    MCH 29.5 09/06/2022    MCHC 34.3 09/06/2022    RDW 12.5 09/06/2022     09/06/2022     MPV 10.8 09/06/2022    LYMPH 36.0 09/06/2022    LYMPH 2.63 09/06/2022    MONO 4.2 09/06/2022    EOS 0.13 09/06/2022    BASO 0.06 09/06/2022     Results for orders placed or performed in visit on 09/09/21   EKG 12-lead    Collection Time: 09/09/21 10:52 AM    Narrative    Test Reason : Z01.818,    Vent. Rate : 085 BPM     Atrial Rate : 085 BPM     P-R Int : 140 ms          QRS Dur : 100 ms      QT Int : 378 ms       P-R-T Axes : 075 079 040 degrees     QTc Int : 449 ms    Normal sinus rhythm  Normal ECG  No previous ECGs available  Confirmed by Francisco GUERRERO, Mynor LONGORIA ([id number]) on 9/13/2021 12:14:55 PM    Referred By: ANNA SANCHEZ           Confirmed By:Mynor Singh MD         Anesthesia Plan  Type of Anesthesia, risks & benefits discussed:    Anesthesia Type: Gen Supraglottic Airway, Spinal, Regional  Intra-op Monitoring Plan: Standard ASA Monitors  Post Op Pain Control Plan: multimodal analgesia  Induction:  IV  Airway Plan: Direct, Post-Induction  Informed Consent: Informed consent signed with the Patient and all parties understand the risks and agree with anesthesia plan.  All questions answered.   ASA Score: 3  Day of Surgery Review of History & Physical: H&P Update referred to the surgeon/provider.I have interviewed and examined the patient. I have reviewed the patient's H&P dated: There are no significant changes.     Ready For Surgery From Anesthesia Perspective.     .

## 2022-09-14 NOTE — CONSULTS
[facility]  Hospital Medicine  Consult Note    Patient Name: Jonna Cunha  MRN: [id number]  Admission Date: 2022  Hospital Length of Stay: 0 days  Attending Physician: Manuel Cruz MD   Primary Care Provider: Abimbola Fischer MD           Patient information was obtained from patient.     Consults  Subjective:     Principal Problem: <principal problem not specified>    Chief Complaint: No chief complaint on file.       HPI: 67yowf with PMH of HTN, DM, arthritis who presents to [facility] for left total knee with Dr. Cruz.  There were no major complications encountered intraoperatively.  She was in her normal state of health prior to her procedure. Her only complaint at this time is left knee pain.  Medications were reviewed during our interview.  She has reasonably well controlled diabetes and is compliant with her home medications.  She has seen cardiology in the past for palpitations but with no formal diagnosis. Noted Nebivolol at home.  ROS otherwise negative.      Past Medical History:   Diagnosis Date    Arthritis     Diabetes mellitus, type 2     Hypertension     Thyroid disease        Past Surgical History:   Procedure Laterality Date     SECTION      CHOLECYSTECTOMY      KNEE ARTHROSCOPY W/ MENISCECTOMY Left 2021    Procedure: ARTHROSCOPY, KNEE, WITH MENISCECTOMY;  Surgeon: Manuel Cruz MD;  Location: [facility] OR;  Service: Orthopedics;  Laterality: Left;    REPAIR OF MENISCUS OF KNEE Left 2021    Procedure: REPAIR, MENISCUS, KNEE;  Surgeon: Manuel Cruz MD;  Location: [facility] OR;  Service: Orthopedics;  Laterality: Left;    SPINE SURGERY      TUBAL LIGATION         Review of patient's allergies indicates:   Allergen Reactions    Corticosteroids (glucocorticoids)        No current facility-administered medications on file prior to encounter.     Current Outpatient Medications on File Prior to Encounter   Medication Sig     amLODIPine (NORVASC) 5 MG tablet TAKE 1 TABLET (5 MG TOTAL) BY MOUTH ONCE DAILY FOR BLOOD PRESSURE.    insulin (LANTUS SOLOSTAR U-100 INSULIN) glargine 100 units/mL (3mL) SubQ pen 62 Units by abdominal subcutaneous route every evening.    LANTUS SOLOSTAR U-100 INSULIN glargine 100 units/mL (3mL) SubQ pen Inject 62 Units into the skin every evening.    nebivoloL (BYSTOLIC) 5 MG Tab Take 10 mg by mouth once daily.    olmesartan (BENICAR) 40 MG tablet Take 40 mg by mouth once daily.    semaglutide (RYBELSUS) 3 mg tablet Take 3 mg by mouth once daily.    [DISCONTINUED] celecoxib (CELEBREX) 200 MG capsule Take 2 capsules by mouth 2 (two) times a day.    [DISCONTINUED] lisinopriL (PRINIVIL,ZESTRIL) 20 MG tablet Take 40 mg by mouth once daily.    [DISCONTINUED] lisinopriL 10 MG tablet Take 10 mg by mouth once daily.    [DISCONTINUED] naproxen (NAPROSYN) 500 MG tablet Take 1 tablet (500 mg total) by mouth 2 (two) times daily with meals.     Family History       Problem Relation (Age of Onset)    Cancer Mother, Father          Tobacco Use    Smoking status: Never    Smokeless tobacco: Never   Substance and Sexual Activity    Alcohol use: Not Currently    Drug use: Never     Comment: Prescribed by Fabby Sheffield    Sexual activity: Yes     Review of Systems   Constitutional:  Negative for chills, fatigue, fever and unexpected weight change.   HENT:  Negative for congestion, mouth sores and sore throat.    Eyes:  Negative for photophobia and visual disturbance.   Respiratory:  Negative for cough, chest tightness, shortness of breath and wheezing.    Cardiovascular:  Negative for chest pain, palpitations and leg swelling.   Gastrointestinal:  Negative for abdominal pain, diarrhea, nausea and vomiting.   Endocrine: Negative for cold intolerance and heat intolerance.   Genitourinary:  Negative for difficulty urinating, dysuria, frequency and urgency.   Musculoskeletal:  Negative for arthralgias, back pain and  myalgias.   Skin:  Negative for pallor and rash.   Neurological:  Negative for tremors, seizures, syncope, weakness, numbness and headaches.   Hematological:  Does not bruise/bleed easily.   Psychiatric/Behavioral:  Negative for agitation, confusion, hallucinations and suicidal ideas.    All other systems reviewed and are negative.  Objective:     Vital Signs (Most Recent):  Temp: 97.9 °F (36.6 °C) (09/14/22 1112)  Pulse: 79 (09/14/22 1125)  Resp: 20 (09/14/22 1153)  BP: 121/71 (09/14/22 1125)  SpO2: 99 % (09/14/22 1125) Vital Signs (24h Range):  Temp:  [97.6 °F (36.4 °C)-98 °F (36.7 °C)] 97.9 °F (36.6 °C)  Pulse:  [58-95] 79  Resp:  [12-20] 20  SpO2:  [94 %-100 %] 99 %  BP: (121-153)/(55-71) 121/71     Weight: 86.1 kg (189 lb 12.8 oz)  Body mass index is 31.58 kg/m².    Physical Exam  Vitals reviewed.   Constitutional:       Appearance: Normal appearance.   HENT:      Head: Normocephalic and atraumatic.      Nose: Nose normal.   Eyes:      Extraocular Movements: Extraocular movements intact.      Conjunctiva/sclera: Conjunctivae normal.   Neck:      Trachea: Trachea normal.   Cardiovascular:      Rate and Rhythm: Normal rate and regular rhythm.      Pulses: Normal pulses.      Heart sounds: Normal heart sounds.   Pulmonary:      Effort: Pulmonary effort is normal.      Breath sounds: Normal breath sounds and air entry.   Abdominal:      General: Bowel sounds are normal.      Palpations: Abdomen is soft.   Musculoskeletal:         General: Tenderness (left knee pain) present.      Cervical back: Neck supple.      Comments: Normal tone, moves all extremities   Skin:     General: Skin is warm and dry.   Neurological:      General: No focal deficit present.      Mental Status: She is alert and oriented to person, place, and time.      Cranial Nerves: Cranial nerves 2-12 are intact.      Comments: Grossly normal motor and sensory function without focal deficit appreciated.   Psychiatric:         Mood and Affect: Mood  and affect normal.         Behavior: Behavior is cooperative.       Significant Labs: All pertinent labs within the past 24 hours have been reviewed.    Significant Imaging: I have reviewed all pertinent imaging results/findings within the past 24 hours.    Assessment/Plan:     Essential hypertension  Well controlled as of now. Caution with ARBs in periop period  Will follow renal function      Diabetes  With nephropathy  SSI and basal while in house  Hold Oral hypoglycemics while patient is in the hospital.  Resume home regimen at discharge    Arthritis  As above      Acute pain of left knee  S/p total knee with Dr. Cruz        VTE Risk Mitigation (From admission, onward)         Ordered     IP VTE LOW RISK PATIENT  Once         09/14/22 1126     Place SELINA hose  Until discontinued         09/14/22 1126     Place sequential compression device  Until discontinued         09/14/22 1126                    Thank you for your consult. I will follow-up with patient. Please contact us if you have any additional questions.    Tony Caraballo,   Department of Hospital Medicine   [facility]

## 2022-09-14 NOTE — ANESTHESIA PROCEDURE NOTES
Spinal    Diagnosis: left TKA  Patient location during procedure: OR  Timeout: 9/14/2022 8:06 AM    Staffing  Authorizing Provider: Norberto Collins  Performing Provider: Norberto Collins    Preanesthetic Checklist  Completed: patient identified, IV checked, site marked, risks and benefits discussed, surgical consent, monitors and equipment checked, pre-op evaluation and timeout performed  Spinal Block  Patient position: sitting  Prep: ChloraPrep  Patient monitoring: heart rate, continuous pulse ox and frequent blood pressure checks  Approach: midline  Location: L4-5  Injection technique: single shot  CSF Fluid: clear free-flowing CSF  Needle  Needle type: Quincke   Needle gauge: 22 G  Needle length: 3.5 in  Additional Documentation: incremental injection and negative aspiration for heme  Needle localization: anatomical landmarks  Assessment  Ease of block: moderate  Patient's tolerance of the procedure: comfortable throughout block  Medications:    Medications: BUPivacaine (pf) (MARCAINE) injection 0.75% - Intraspinal   1.6 mL - 9/14/2022 8:10:00 AM

## 2022-09-14 NOTE — ANESTHESIA PROCEDURE NOTES
Intubation    Date/Time: 9/14/2022 8:09 AM  Performed by: Mai Helm CRNA  Authorized by: Norberto Collins     Intubation:     Induction:  Intravenous    Intubated:  Postinduction    Mask Ventilation:  N/a    Attempts:  1    Attempted By:  CRNA    Method of Intubation:  Other (see comments)    Difficult Airway Encountered?: No      Complications:  None    Airway Device:  Supraglottic airway/LMA    Airway Device Size:  3.0    Style/Cuff Inflation:  Cuffed (inflated to minimal occlusive pressure)    Inflation Amount (mL):  10    Placement Verified By:  Capnometry    Complicating Factors:  None    Findings Post-Intubation:  BS equal bilateral and atraumatic/condition of teeth unchanged

## 2022-09-14 NOTE — PLAN OF CARE
Problem: Occupational Therapy  Goal: Occupational Therapy Goal  Description: ST.Pt will perform bathing with Fausto with setup at EOB  2.Pt will perform UE dressing with Nydia  3.Pt will perform LE dressing with Fausto  4.Pt will transfer bed/chair/bsc with SBA with RW  5.Pt will perform standing task x 2 min with SBA with RW  6.Tolerate 15 min of tx without fatigue.      LTG:   Restore to max I with selfcare and mobility.      Outcome: Ongoing, Progressing

## 2022-09-14 NOTE — TRANSFER OF CARE
"Anesthesia Transfer of Care Note    Patient: Jonna Cunha    Procedure(s) Performed: Procedure(s) (LRB):  ARTHROPLASTY, KNEE, TOTAL, USING COMPUTER-ASSISTED NAVIGATION (Left)    Patient location: PACU    Anesthesia Type: general, regional and spinal    Transport from OR: Transported from OR on 6-10 L/min O2 by face mask with adequate spontaneous ventilation    Post pain: adequate analgesia    Post assessment: no apparent anesthetic complications    Post vital signs: stable    Level of consciousness: awake and alert    Nausea/Vomiting: no nausea/vomiting    Complications: none    Transfer of care protocol was followed      Last vitals:   Visit Vitals  BP (!) 145/69   Pulse 86   Temp 36.7 °C (98 °F) (Axillary)   Resp 12   Ht 5' 5" (1.651 m)   Wt 86.1 kg (189 lb 12.8 oz)   SpO2 100%   Breastfeeding No   BMI 31.58 kg/m²     "

## 2022-09-14 NOTE — OP NOTE
Department of Orthopedic Surgery    Operative Note  NAME: Jonna Cunha    : 1955      DATE: 2022      PREOPERATIVE DIAGNOSIS: Primary osteoarthritis of left knee [M17.12]    POSTOPERATIVE DIAGNOSIS:  Primary osteoarthritis of left knee [M17.12]    PROCEDURE: left Total Knee Arthroplasty    SURGEON: Manuel Cruz    ASSISTANT: Jaden Shirley    ANESTHESIA: Spinal + adductor canal block    BLOOD LOSS: 25 cc    TOURNIQUET TIME:  52 mins    DRAINS: None    IMPLANTS:   Implant Name Type Inv. Item Serial No.  Lot No. LRB No. Used Action   CEMENT BONE SMARTSET HV 40 GR. - [id number]  CEMENT BONE SMARTSET HV 40 GR.  [employer] [id number] Left 1 Implanted   CEMENT BONE SMARTSET HV 40 GR. - [id number]  CEMENT BONE SMARTSET HV 40 GR.  [employer] [id number] Left 1 Implanted   FEMORAL CRUCIATE RETAINING NARROW SIZE 5     [id number] Left 1 Implanted   COMP ATTUNE PKT RP EMEKA 4 - [id number]  COMP ATTUNE PKT RP EMEKA 4  [employer] [id number] Left 1 Implanted   PATELLA ATTUNE MEDLZD DOME 35 - [id number]  PATELLA ATTUNE MEDLZD DOME 35  [employer] [id number] Left 1 Implanted   TIBIAL INSERT ROTATING PLATFORM SIZE 5     [id number] Left 1 Implanted           INDICATIONS FOR PROCEDURE:   [unfilled] is a 67 y.o.-year-old with debilitating left-sided knee pain despite nonoperative measures and interested in knee arthroplasty.    PROCEDURE IN DETAIL:  After obtaining informed consent and starting the patient on preoperative IV antibiotics, the patient was taken back to the Operating   Room. Anesthesia was performed by Anesthesia Team. The left lower  extremity was prepped and draped in normal sterile fashion.  An Esmarch bandage was used to exsanguinate the affected lower extremity and the pneumatic tourniquet  was inflated to 300 mmHg.  A standard longitudinal midline incision was made beginning 3 fingerbreadths above the superior pole of the patella extending down to the tibial tubercle.  Full-thickness skin flaps  were raised.  A medial parapatellar arthrotomy was made. The patella was then everted.    A release of the proximal medial tibia and MCL was then undertaken.  The infrapatella fat pad was resected.  An intramedullary guide was then used to perform the distal femur cut.  With the guide set at the appropriate degree of valgus alignment, the guide was inserted and the appropriate cut was made=  We then used our size 5, 4-in-1 cutting block to make our anterior, posterior, and anterior and posterior chamfer cuts. The tibial cut was addressed next.  Computer navigation was used in this case to verify the correct tibial alignment.  2 guidepins were placed percutaneously in the tibia, and waypoints were inserted into the [employer] software.  An extramedullary tibial guide was also used to verify corrected alignment.  The proximal tibia was then cut. The tibia was then drilled and punched.  Trial components were placed on the femur and tibia.   Flexion and extension gaps were symmetric and balanced at this point.    The trial components demonstrated proper range of motion and stability.    The patella was addressed next.  A guide was utilized to resect 9.5 mm of bone.  A drill guide was applied to flat patella surface and appropriate drill holes were made.  A trial patella button was placed.  The patella tracked nicely.  No lateral release was required.  We then Pulsavac irrigated cancellous bone and vacuum mixed the cement.      The final components were then implanted.  The tibial component was placed first, followed by the femoral component.  Excess cement was removed and a trial polyethylene was placed.  The cemented patella was then placed and excess cement was removed.   Once the cement hardened,the tourniquet was released.  Bleeders were coagulated.  1 gram of TXA was given at this point. The appropriate dose of pericapsular injection was then administered, focusing on the posterior capsule.  Final polyethylene  component was placed.  Satisfactory range of motion and stability was demonstrated.    The extensor retinaculum was then clsoed with #1 Vicryl figure of eight sutures with the knee in slight flexion, the subcutaneous tissue with 2-0 Vicryl, skin reapproximated with staples.  A sterile dressing was applied, as was a knee immobilizer.  Patient was then taken to the recovery room in stable condition.

## 2022-09-14 NOTE — ANESTHESIA PROCEDURE NOTES
Peripheral Block    Patient location during procedure: pre-op   Block not for primary anesthetic.  Reason for block: at surgeon's request and post-op pain management   Post-op Pain Location: left knee   Timeout: 9/14/2022 8:06 AM     Staffing  Authorizing Provider: Norberto Collins  Performing Provider: Norberto Collins    Preanesthetic Checklist  Completed: patient identified, IV checked, site marked, risks and benefits discussed, surgical consent, monitors and equipment checked, pre-op evaluation and timeout performed  Peripheral Block  Patient position: supine  Prep: ChloraPrep  Patient monitoring: heart rate, cardiac monitor, continuous pulse ox, continuous capnometry and frequent blood pressure checks  Block type: adductor canal  Laterality: left  Injection technique: single shot  Needle  Needle type: Stimuplex   Needle gauge: 21 G  Needle length: 4 in  Needle localization: anatomical landmarks and ultrasound guidance   -ultrasound image captured on disc.  Assessment  Injection assessment: negative aspiration, negative parasthesia and local visualized surrounding nerve  Paresthesia pain: none  Heart rate change: no  Slow fractionated injection: yes    Medications:    Medications: ROPIvacaine (NAROPIN) injection 0.75% - Perineural   15 mL - 9/14/2022 8:15:00 AM    Additional Notes  VSS.  [facility] RN monitoring vitals throughout procedure.  Patient tolerated procedure well.

## 2022-09-14 NOTE — ASSESSMENT & PLAN NOTE
With nephropathy  SSI and basal while in house  Hold Oral hypoglycemics while patient is in the hospital.  Resume home regimen at discharge

## 2022-09-14 NOTE — ANESTHESIA POSTPROCEDURE EVALUATION
Anesthesia Post Evaluation    Patient: Jonna Cunha    Procedure(s) Performed: Procedure(s) (LRB):  ARTHROPLASTY, KNEE, TOTAL, USING COMPUTER-ASSISTED NAVIGATION (Left)    Final Anesthesia Type: spinal      Patient location during evaluation: labor & delivery  Patient participation: Yes- Able to Participate  Level of consciousness: awake and alert and oriented  Pain management: adequate  Airway patency: patent  ELISABETH mitigation strategies: Multimodal analgesia  PONV status at discharge: No PONV  Anesthetic complications: no      Cardiovascular status: hemodynamically stable  Respiratory status: unassisted and spontaneous ventilation  Hydration status: euvolemic  Follow-up not needed.          Vitals Value Taken Time   /69 09/14/22 1034   Temp 36.7 °C (98 °F) 09/14/22 1034   Pulse 86 09/14/22 1034   Resp 12 09/14/22 1034   SpO2 100 % 09/14/22 1034         No case tracking events are documented in the log.      Pain/Dk Score: No data recorded

## 2022-09-14 NOTE — OR NURSING
1032 REC'D TO PACU IN STABLE COND. PT SLEEPING, WAKES TO VOICE. VS STABLE. NO DISTRESS NOTED @ THIS TIME.      1100 TRANSFERRED TO ROOM 469 IN STABLE COND. VS STABLE. BEDSIDE REPORT GIVEN TO HOLA CASTLE. NO DISTRESS NOTED @ THIS TIME.

## 2022-09-14 NOTE — SUBJECTIVE & OBJECTIVE
Past Medical History:   Diagnosis Date    Arthritis     Diabetes mellitus, type 2     Hypertension     Thyroid disease        Past Surgical History:   Procedure Laterality Date     SECTION      CHOLECYSTECTOMY      KNEE ARTHROSCOPY W/ MENISCECTOMY Left 2021    Procedure: ARTHROSCOPY, KNEE, WITH MENISCECTOMY;  Surgeon: Manuel Cruz MD;  Location: [facility] OR;  Service: Orthopedics;  Laterality: Left;    REPAIR OF MENISCUS OF KNEE Left 2021    Procedure: REPAIR, MENISCUS, KNEE;  Surgeon: Manuel Cruz MD;  Location: [facility] OR;  Service: Orthopedics;  Laterality: Left;    SPINE SURGERY      TUBAL LIGATION         Review of patient's allergies indicates:   Allergen Reactions    Corticosteroids (glucocorticoids)        No current facility-administered medications on file prior to encounter.     Current Outpatient Medications on File Prior to Encounter   Medication Sig    amLODIPine (NORVASC) 5 MG tablet TAKE 1 TABLET (5 MG TOTAL) BY MOUTH ONCE DAILY FOR BLOOD PRESSURE.    insulin (LANTUS SOLOSTAR U-100 INSULIN) glargine 100 units/mL (3mL) SubQ pen 62 Units by abdominal subcutaneous route every evening.    LANTUS SOLOSTAR U-100 INSULIN glargine 100 units/mL (3mL) SubQ pen Inject 62 Units into the skin every evening.    nebivoloL (BYSTOLIC) 5 MG Tab Take 10 mg by mouth once daily.    olmesartan (BENICAR) 40 MG tablet Take 40 mg by mouth once daily.    semaglutide (RYBELSUS) 3 mg tablet Take 3 mg by mouth once daily.    [DISCONTINUED] celecoxib (CELEBREX) 200 MG capsule Take 2 capsules by mouth 2 (two) times a day.    [DISCONTINUED] lisinopriL (PRINIVIL,ZESTRIL) 20 MG tablet Take 40 mg by mouth once daily.    [DISCONTINUED] lisinopriL 10 MG tablet Take 10 mg by mouth once daily.    [DISCONTINUED] naproxen (NAPROSYN) 500 MG tablet Take 1 tablet (500 mg total) by mouth 2 (two) times daily with meals.     Family History       Problem Relation (Age of Onset)    Cancer Mother, Father           Tobacco Use    Smoking status: Never    Smokeless tobacco: Never   Substance and Sexual Activity    Alcohol use: Not Currently    Drug use: Never     Comment: Prescribed by Fabby Sheffield    Sexual activity: Yes     Review of Systems   Constitutional:  Negative for chills, fatigue, fever and unexpected weight change.   HENT:  Negative for congestion, mouth sores and sore throat.    Eyes:  Negative for photophobia and visual disturbance.   Respiratory:  Negative for cough, chest tightness, shortness of breath and wheezing.    Cardiovascular:  Negative for chest pain, palpitations and leg swelling.   Gastrointestinal:  Negative for abdominal pain, diarrhea, nausea and vomiting.   Endocrine: Negative for cold intolerance and heat intolerance.   Genitourinary:  Negative for difficulty urinating, dysuria, frequency and urgency.   Musculoskeletal:  Negative for arthralgias, back pain and myalgias.   Skin:  Negative for pallor and rash.   Neurological:  Negative for tremors, seizures, syncope, weakness, numbness and headaches.   Hematological:  Does not bruise/bleed easily.   Psychiatric/Behavioral:  Negative for agitation, confusion, hallucinations and suicidal ideas.    All other systems reviewed and are negative.  Objective:     Vital Signs (Most Recent):  Temp: 97.9 °F (36.6 °C) (09/14/22 1112)  Pulse: 79 (09/14/22 1125)  Resp: 20 (09/14/22 1153)  BP: 121/71 (09/14/22 1125)  SpO2: 99 % (09/14/22 1125) Vital Signs (24h Range):  Temp:  [97.6 °F (36.4 °C)-98 °F (36.7 °C)] 97.9 °F (36.6 °C)  Pulse:  [58-95] 79  Resp:  [12-20] 20  SpO2:  [94 %-100 %] 99 %  BP: (121-153)/(55-71) 121/71     Weight: 86.1 kg (189 lb 12.8 oz)  Body mass index is 31.58 kg/m².    Physical Exam  Vitals reviewed.   Constitutional:       Appearance: Normal appearance.   HENT:      Head: Normocephalic and atraumatic.      Nose: Nose normal.   Eyes:      Extraocular Movements: Extraocular movements intact.      Conjunctiva/sclera: Conjunctivae  normal.   Neck:      Trachea: Trachea normal.   Cardiovascular:      Rate and Rhythm: Normal rate and regular rhythm.      Pulses: Normal pulses.      Heart sounds: Normal heart sounds.   Pulmonary:      Effort: Pulmonary effort is normal.      Breath sounds: Normal breath sounds and air entry.   Abdominal:      General: Bowel sounds are normal.      Palpations: Abdomen is soft.   Musculoskeletal:         General: Tenderness (left knee pain) present.      Cervical back: Neck supple.      Comments: Normal tone, moves all extremities   Skin:     General: Skin is warm and dry.   Neurological:      General: No focal deficit present.      Mental Status: She is alert and oriented to person, place, and time.      Cranial Nerves: Cranial nerves 2-12 are intact.      Comments: Grossly normal motor and sensory function without focal deficit appreciated.   Psychiatric:         Mood and Affect: Mood and affect normal.         Behavior: Behavior is cooperative.       Significant Labs: All pertinent labs within the past 24 hours have been reviewed.    Significant Imaging: I have reviewed all pertinent imaging results/findings within the past 24 hours.

## 2022-09-14 NOTE — HPI
67yowf with PMH of HTN, DM, arthritis who presents to [facility] for left total knee with Dr. Cruz.  There were no major complications encountered intraoperatively.  She was in her normal state of health prior to her procedure. Her only complaint at this time is left knee pain.  Medications were reviewed during our interview.  She has reasonably well controlled diabetes and is compliant with her home medications.  She has seen cardiology in the past for palpitations but with no formal diagnosis. Noted Nebivolol at home.  ROS otherwise negative.

## 2022-09-14 NOTE — PLAN OF CARE
Problem: Physical Therapy  Goal: Physical Therapy Goal  Description: Short Term Goals to be met by: 2022    Patient will increase functional independence with mobility by performin. Supine to sit with Modified [place]  2. Sit to stand transfer with Modified [place]  3. Bed to chair transfer with Modified [place] using Rolling Walker, WBAT left LE  4. Gait  x 100 feet with Modified [place] using Rolling Walker, WBAT left LE.   5. Lower extremity exercise program x30 reps per handout, with assistance as needed  6. Knee ROM 0-90    Long Term Goals to be met by: 2022    Pt will regain full independent functional mobility to return to prior activities of daily living.   Outcome: Ongoing, Progressing     Patient able to participate with PT evaluation despite c/o significant left knee pain. Patient reports that she will not follow through with home exercises without professional assistance and is anticipating swing bed at discharge. Pt to follow per protocol whilst pt hospitalized.

## 2022-09-14 NOTE — PT/OT/SLP EVAL
"Physical Therapy Evaluation    Patient Name:  Jonna Cunha   MRN:  [id number]    Recommendations:     Discharge Recommendations:  nursing facility, skilled   Discharge Equipment Recommendations: bedside commode   Barriers to discharge: None    Assessment:     Jonna Cunha is a 67 y.o. female admitted with a medical diagnosis of L TKR.  She presents with the following impairments/functional limitations:  weakness, impaired endurance, impaired self care skills, impaired functional mobility, gait instability, impaired balance, decreased lower extremity function, pain, decreased ROM .       Patient able to participate with PT evaluation despite c/o significant left knee pain. Patient reports that she will not follow through with home exercises without professional assistance and is anticipating swing bed at discharge. Pt to follow per protocol whilst pt hospitalized.    Rehab Prognosis: Good; patient would benefit from acute skilled PT services to address these deficits and reach maximum level of function.    Recent Surgery: Procedure(s) (LRB):  ARTHROPLASTY, KNEE, TOTAL, USING COMPUTER-ASSISTED NAVIGATION (Left) Day of Surgery    Plan:     During this hospitalization, patient to be seen BID (5x/week; daily 2x/week) to address the identified rehab impairments via gait training, therapeutic activities, therapeutic exercises and progress toward the following goals:    Plan of Care Expires:  10/14/22    Subjective     Chief Complaint: left TKR  Patient/Family Comments/goals: "I have such a deep pain in this knee. The pain medicine helped a little bit but I am still so painful."   Pain/Comfort:  Pain Rating 1: 8/10  Location - Side 1: Left  Location 1: knee  Pain Addressed 1: Pre-medicate for activity, Reposition, Distraction, Cessation of Activity, Nurse notified  Pain Rating Post-Intervention 1: 8/10    Patients cultural, spiritual, Pentecostalism conflicts given the current situation: no    Living Environment:  Pt lives " with spouse in a single level home with no steps to enter  Prior to admission, patients level of function was independent.  Equipment used at home: walker, rolling, crutches, axillary.  DME owned (not currently used): none.  Upon discharge, patient will have assistance from swing bed staff.    Objective:     Communicated with LEW Muñoz prior to session.  Patient found supine with pulse ox (continuous), blood pressure cuff, knee immobilizer, cryotherapy, peripheral IV, SCD  upon PT entry to room.    General Precautions: Standard, fall   Orthopedic Precautions:LLE weight bearing as tolerated   Braces: Knee immobilizer  Respiratory Status: Room air    Exams:  Cognitive Exam:  Patient is oriented to Person, Place, Time, and Situation  Sensation:    -       Intact  RLE ROM: WFL  RLE Strength: WFL  LLE ROM: Deficits: hip WFL; knee not tested in KI; ankle WFL  LLE Strength: Deficits: hip 4/5; knee not tested; ankle 5/5    Functional Mobility:  Bed Mobility:     Supine to Sit: minimum assistance and verbal cues for sequenicng  Transfers:     Sit to Stand:  contact guard assistance and verbal cues for sequencing/safety with rolling walker and KI  Bed to Chair: contact guard assistance and verbal cues with  rolling walker and KI  using  Step Transfer  Gait: 40' using RW/KI; step to pattern, slow doretha, no c/o lightheadedness or LOB    Therapeutic Activities and Exercises:   Education:   Importance of keeping surgical knee straight when in chair or in bed. Pillow/towel roll should not be behind the knee but may be placed under calf or heel in order to further assist with passive knee extension.   Weight bearing as tolerated left lower extremity  Purpose and operation of cryocuff machine. Discussed necessity of pillowcase or thin towel being placed between skin and ice pack.   TKR protocol exercises to perform BID x 30 reps. Handout provided.   Rolling walker should be used for safe ambulation until progressed by  HHPT or Outpatient PT.   Mobility during hospital stay with staff assistance for safety  Purpose and schedule for CPM beginning post-op day one.     AM-PAC 6 CLICK MOBILITY  Total Score:17     Patient left up in chair with all lines intact, call button in reach, KIN Megan Muñoz notified, and spouse present.    GOALS:   Multidisciplinary Problems       Physical Therapy Goals          Problem: Physical Therapy    Goal Priority Disciplines Outcome Goal Variances Interventions   Physical Therapy Goal     PT, PT/OT Ongoing, Progressing     Description: Short Term Goals to be met by: 2022    Patient will increase functional independence with mobility by performin. Supine to sit with Modified [place]  2. Sit to stand transfer with Modified [place]  3. Bed to chair transfer with Modified [place] using Rolling Walker, WBAT left LE  4. Gait  x 100 feet with Modified [place] using Rolling Walker, WBAT left LE.   5. Lower extremity exercise program x30 reps per handout, with assistance as needed  6. Knee ROM 0-90    Long Term Goals to be met by: 2022    Pt will regain full independent functional mobility to return to prior activities of daily living.                        History:     Past Medical History:   Diagnosis Date    Arthritis     Diabetes mellitus, type 2     Hypertension     Thyroid disease        Past Surgical History:   Procedure Laterality Date     SECTION      CHOLECYSTECTOMY      KNEE ARTHROSCOPY W/ MENISCECTOMY Left 2021    Procedure: ARTHROSCOPY, KNEE, WITH MENISCECTOMY;  Surgeon: Manuel Cruz MD;  Location: [facility];  Service: Orthopedics;  Laterality: Left;    REPAIR OF MENISCUS OF KNEE Left 2021    Procedure: REPAIR, MENISCUS, KNEE;  Surgeon: Manuel Cruz MD;  Location: [facility];  Service: Orthopedics;  Laterality: Left;    SPINE SURGERY      TUBAL LIGATION         Time Tracking:     PT Received On: 22  PT Start Time: 1314      PT Stop Time: 1338  PT Total Time (min): 24 min     Billable Minutes: Evaluation Low complexity      09/14/2022

## 2022-09-14 NOTE — PT/OT/SLP EVAL
Occupational Therapy   Evaluation    Name: Jonna Cunha  MRN: [id number]  Admitting Diagnosis:  <principal problem not specified>  Recent Surgery: Procedure(s) (LRB):  ARTHROPLASTY, KNEE, TOTAL, USING COMPUTER-ASSISTED NAVIGATION (Left) Day of Surgery    Recommendations:     Discharge Recommendations: nursing facility, skilled  Discharge Equipment Recommendations:  bedside commode  Barriers to discharge:  None    Assessment:     Jonna Cunha is a 67 y.o. female with a medical diagnosis of <principal problem not specified>.  She presents with s/p left TKR on 9/14/22. Performance deficits affecting function: impaired self care skills, impaired functional mobility, gait instability, impaired balance, pain.  Pt with complaints of 8/10 pain. Pt anticipating d/c to swing bed.    Rehab Prognosis: Good; patient would benefit from acute skilled OT services to address these deficits and reach maximum level of function.       Plan:     Patient to be seen 5 x/week to address the above listed problems via self-care/home management, therapeutic activities, therapeutic exercises  Plan of Care Expires: 09/28/22  Plan of Care Reviewed with: patient    Subjective     Chief Complaint: s/p TKR  Patient/Family Comments/goals: pt agreeable to participate in OT eval    Occupational Profile:  Living Environment: pt lives with  in one story home with no steps to enter  Previous level of function: independent with all ADL tasks, IADL tasks, and functional mobility   Roles and Routines: perform self care  Equipment Used at Home:  walker, rolling  Assistance upon Discharge: swing bed    Pain/Comfort:  Pain Rating 1: 8/10  Location - Side 1: Left  Location 1: knee  Pain Addressed 1: Pre-medicate for activity, Nurse notified  Pain Rating Post-Intervention 1: 8/10    Patients cultural, spiritual, Anglican conflicts given the current situation: no    Objective:     Communicated with: LEW [place] prior to session.  Patient found supine  with blood pressure cuff, knee immobilizer, cryotherapy, peripheral IV, pulse ox (continuous), SCD upon OT entry to room.    General Precautions: Standard,     Orthopedic Precautions:LLE weight bearing as tolerated   Braces: Knee immobilizer  Respiratory Status: Room air    Occupational Performance:    Bed Mobility:    Patient completed Supine to Sit with minimum assistance    Functional Mobility/Transfers:  Patient completed Sit <> Stand Transfer with contact guard assistance  with  rolling walker   Patient completed Bed <> Chair Transfer using Step Transfer technique with contact guard assistance with rolling walker  Functional Mobility: pt performed functional mobility to door with RW with CGA    Activities of Daily Living:  Upper Body Dressing: minimum assistance to maria g gown  Lower Body Dressing: maximal assistance to maria g socks    Cognitive/Visual Perceptual:  Cognitive/Psychosocial Skills:     -       Oriented to: Person, Place, Time, and Situation   -       Follows Commands/attention:Follows multistep  commands  -       Mood/Affect/Coping skills/emotional control: Cooperative    Physical Exam:  Balance:    -       WFL with RW  Upper Extremity Range of Motion:     -       Right Upper Extremity: WFL  -       Left Upper Extremity: WFL  Upper Extremity Strength:    -       Right Upper Extremity: WFL  -       Left Upper Extremity: WFL  Gross motor coordination:   WFL    AMPAC 6 Click ADL:  AMPAC Total Score:      Treatment & Education:  Pt educated on OT role/POC.   Importance of OOB activity with staff assistance.  Importance of sitting up in the chair throughout the day as tolerated, especially for meals   Safety during functional t/f and mobility with use of RW  Importance of assisting with self-care activities   All questions/concerns answered within OT scope of practice      Patient left up in chair with all lines intact, call button in reach, Annabella, KIN notified, and  present    GOALS:    Multidisciplinary Problems       Occupational Therapy Goals          Problem: Occupational Therapy    Goal Priority Disciplines Outcome Interventions   Occupational Therapy Goal     OT, PT/OT Ongoing, Progressing    Description: ST.Pt will perform bathing with Fausto with setup at EOB  2.Pt will perform UE dressing with Nydia  3.Pt will perform LE dressing with Fausto  4.Pt will transfer bed/chair/bsc with SBA with RW  5.Pt will perform standing task x 2 min with SBA with RW  6.Tolerate 15 min of tx without fatigue.      LTG:   Restore to max I with selfcare and mobility.                           History:     Past Medical History:   Diagnosis Date    Arthritis     Diabetes mellitus, type 2     Hypertension     Thyroid disease          Past Surgical History:   Procedure Laterality Date     SECTION      CHOLECYSTECTOMY      KNEE ARTHROSCOPY W/ MENISCECTOMY Left 2021    Procedure: ARTHROSCOPY, KNEE, WITH MENISCECTOMY;  Surgeon: Manuel Cruz MD;  Location: [facility];  Service: Orthopedics;  Laterality: Left;    REPAIR OF MENISCUS OF KNEE Left 2021    Procedure: REPAIR, MENISCUS, KNEE;  Surgeon: Manuel Cruz MD;  Location: [facility];  Service: Orthopedics;  Laterality: Left;    SPINE SURGERY      TUBAL LIGATION         Time Tracking:     OT Date of Treatment: 22  OT Start Time: 1317  OT Stop Time: 1338  OT Total Time (min): 21 min    Billable Minutes:Evaluation OT min complexity eval    2022

## 2022-09-14 NOTE — DISCHARGE INSTRUCTIONS
ANKLE: Pumps        Point toes down, then up. Repeat 30 times, 2 sessions per day        Quad Set        With other leg bent, foot flat, slowly tighten muscles on left thigh of straight leg while counting out loud to 5.  Repeat 30 times, 2 sessions per day.          Hip Abduction / Adduction: with Extended Knee (Supine)        Bring left leg out to side and return. Keep knee straight.  Repeat 30 times, 2 sessions per day.         HIP / KNEE: Flexion, Heel Slides - Supine        Slide left heel up toward buttocks, keeping leg in straight line. Repeat 30 times, 2 sessions per day.  Use towel or pillowcase under heel as needed.       Straight Leg Raise        Bend right leg. Raise left leg 8-12 inches with knee locked. Exhale and tighten thigh muscles while raising leg.   Repeat 30 times, 2 sessions per day.           KNEE: Extension, Long Arc Quads - Sitting        Raise left leg until knee is straight.  Repeat 30 times, 2 sessions per day        KNEE: Flexion / Extension - Sitting        Sit at edge of surface, foot on towel or pillowcase. Bend and straighten left knee.  Repeat 30 times, 2 sessions per day.   Use opposite leg to increase knee flexion.    Copyright © V[employer]. All rights reserved.  *Keep dressing dry and intact, do not remove dressing, if dressing becomes wet or bloody notify home health staff.  Swingbed/Home Health will change your dressing post of day #3 and day #10, give them that special dressing we sent home with you. If patient has a GAIL system leave on for 7 days then change dressing.  *Continue incentive spirometry at least every 2 hours while awake.  *Continue white stockings remove 2 times a day for 1 hour and replace. Once dressing is changed they will apply the other stocking to surgery leg.  *Elevate surgery leg at ankle on pillow, no pillow under knees.  *Take laxative of choice to have a bowel movement at least by tomorrow and then every other day.  *Increase fluids by mouth.  *Dr. Jackson  Rush total knees should wear knee immobilizer at night and remove during the day.  *Staples will be removed at follow up appointment  *Notify swingbed/home health staff if any concerns.

## 2022-09-14 NOTE — PLAN OF CARE
[facility]  Initial Discharge Assessment       Primary Care Provider: Abimbola Fischer MD    Admission Diagnosis: Primary osteoarthritis of left knee [M17.12]    Admission Date: 9/14/2022  Expected Discharge Date:     Discharge Barriers Identified: None    Payor: MEDICARE / Plan: MEDICARE PART A & B / Product Type: Government /     Extended Emergency Contact Information  Primary Emergency Contact: Tex Cunha  Address: [address] [place] [address] [facility]  Home Phone: [phone]  Mobile Phone: [phone]  Relation: Spouse  Preferred language: English   needed? No    Discharge Plan A: Other ([facility])  Discharge Plan B: Home with family      [facility] [place] [facility] [address] [facility]  [address] [facility] [address]  Phone: [phone] Fax: [phone]    [facility] [place] [place] - [address] [place] [address]  [address] [place] [address]  Phone: [phone] Fax: [phone]      Initial Assessment (most recent)       Adult Discharge Assessment - 09/14/22 1440          Discharge Assessment    Assessment Type Discharge Planning Assessment     Source of Information patient     Lives With spouse     Do you expect to return to your current living situation? Yes     Do you have help at home or someone to help you manage your care at home? Yes     Who are your caregiver(s) and their phone number(s)? Tex Cunha- Spouse [phone]     Prior to hospitilization cognitive status: Alert/Oriented     Current cognitive status: Alert/Oriented     Home Accessibility stairs to enter home;stairs within home     Number of Stairs, Within Home, Primary none     Number of Stairs, Main Entrance none     Equipment Currently Used at Home walker, rolling;crutches     Patient currently being followed by outpatient case management? No     Do you currently have service(s) that help you manage your care at  home? No     Do you take prescription medications? Yes     Do you have prescription coverage? Yes     Coverage Medicare     Do you have any problems affording any of your prescribed medications? No     Is the patient taking medications as prescribed? yes     Who is going to help you get home at discharge? Tex Cunha- Spouse     How do you get to doctors appointments? car, drives self     Are you on dialysis? No     Do you take coumadin? No     Discharge Plan A Other   [facility]    Discharge Plan B Home with family     DME Needed Upon Discharge  none     Discharge Plan discussed with: Patient     Discharge Barriers Identified None                   Pt lives at home with spouse,not current with hh and has a rw and crutches. Pt plans to dc to [facility] for swb, choice obtained. Pt plans to have oupt therapy arranged after swb. SW made referral with TATO Coelho. SW will cont to follow for dc needs.

## 2022-09-15 ENCOUNTER — HOSPITAL ENCOUNTER (INPATIENT)
Facility: HOSPITAL | Age: 67
LOS: 15 days | Discharge: HOME | DRG: 561 | End: 2022-09-30
Attending: FAMILY MEDICINE | Admitting: INTERNAL MEDICINE
Payer: MEDICARE

## 2022-09-15 VITALS
HEART RATE: 86 BPM | SYSTOLIC BLOOD PRESSURE: 137 MMHG | RESPIRATION RATE: 20 BRPM | BODY MASS INDEX: 31.63 KG/M2 | TEMPERATURE: 99 F | WEIGHT: 189.81 LBS | DIASTOLIC BLOOD PRESSURE: 59 MMHG | HEIGHT: 65 IN | OXYGEN SATURATION: 98 %

## 2022-09-15 DIAGNOSIS — M17.12 PRIMARY OSTEOARTHRITIS OF LEFT KNEE: ICD-10-CM

## 2022-09-15 DIAGNOSIS — Z96.652 S/P TOTAL KNEE ARTHROPLASTY, LEFT: ICD-10-CM

## 2022-09-15 LAB
ANION GAP SERPL CALCULATED.3IONS-SCNC: 12 MMOL/L (ref 7–16)
ANION GAP SERPL CALCULATED.3IONS-SCNC: 12 MMOL/L (ref 7–16)
BASOPHILS # BLD AUTO: 0.02 K/UL (ref 0–0.2)
BASOPHILS # BLD AUTO: 0.03 K/UL (ref 0–0.2)
BASOPHILS NFR BLD AUTO: 0.3 % (ref 0–1)
BASOPHILS NFR BLD AUTO: 0.4 % (ref 0–1)
BILIRUB UR QL STRIP: NEGATIVE
BUN SERPL-MCNC: 17 MG/DL (ref 7–18)
BUN SERPL-MCNC: 19 MG/DL (ref 7–18)
BUN/CREAT SERPL: 18 (ref 6–20)
BUN/CREAT SERPL: 19 (ref 6–20)
CALCIUM SERPL-MCNC: 10 MG/DL (ref 8.5–10.1)
CALCIUM SERPL-MCNC: 9.5 MG/DL (ref 8.5–10.1)
CHLORIDE SERPL-SCNC: 104 MMOL/L (ref 98–107)
CHLORIDE SERPL-SCNC: 106 MMOL/L (ref 98–107)
CLARITY UR: CLEAR
CO2 SERPL-SCNC: 25 MMOL/L (ref 21–32)
CO2 SERPL-SCNC: 26 MMOL/L (ref 21–32)
COLOR UR: YELLOW
CREAT SERPL-MCNC: 0.94 MG/DL (ref 0.55–1.02)
CREAT SERPL-MCNC: 1.01 MG/DL (ref 0.55–1.02)
DIFFERENTIAL METHOD BLD: ABNORMAL
DIFFERENTIAL METHOD BLD: ABNORMAL
EGFR (NO RACE VARIABLE) (RUSH/TITUS): 62 ML/MIN/1.73M²
EGFR (NO RACE VARIABLE) (RUSH/TITUS): 67 ML/MIN/1.73M²
EOSINOPHIL # BLD AUTO: 0.15 K/UL (ref 0–0.5)
EOSINOPHIL # BLD AUTO: 0.19 K/UL (ref 0–0.5)
EOSINOPHIL NFR BLD AUTO: 2.1 % (ref 1–4)
EOSINOPHIL NFR BLD AUTO: 2.5 % (ref 1–4)
ERYTHROCYTE [DISTWIDTH] IN BLOOD BY AUTOMATED COUNT: 12.5 % (ref 11.5–14.5)
ERYTHROCYTE [DISTWIDTH] IN BLOOD BY AUTOMATED COUNT: 12.6 % (ref 11.5–14.5)
GLUCOSE SERPL-MCNC: 139 MG/DL (ref 70–105)
GLUCOSE SERPL-MCNC: 143 MG/DL (ref 74–106)
GLUCOSE SERPL-MCNC: 158 MG/DL (ref 74–106)
GLUCOSE SERPL-MCNC: 172 MG/DL (ref 70–105)
GLUCOSE SERPL-MCNC: 223 MG/DL (ref 70–105)
GLUCOSE UR STRIP-MCNC: 100 MG/DL
HCT VFR BLD AUTO: 31.8 % (ref 38–47)
HCT VFR BLD AUTO: 32.2 % (ref 38–47)
HGB BLD-MCNC: 10.6 G/DL (ref 12–16)
HGB BLD-MCNC: 10.8 G/DL (ref 12–16)
IMM GRANULOCYTES # BLD AUTO: 0.02 K/UL (ref 0–0.04)
IMM GRANULOCYTES # BLD AUTO: 0.02 K/UL (ref 0–0.04)
IMM GRANULOCYTES NFR BLD: 0.3 % (ref 0–0.4)
IMM GRANULOCYTES NFR BLD: 0.3 % (ref 0–0.4)
KETONES UR STRIP-SCNC: NEGATIVE MG/DL
LEUKOCYTE ESTERASE UR QL STRIP: NEGATIVE
LYMPHOCYTES # BLD AUTO: 1.49 K/UL (ref 1–4.8)
LYMPHOCYTES # BLD AUTO: 1.75 K/UL (ref 1–4.8)
LYMPHOCYTES NFR BLD AUTO: 21.1 % (ref 27–41)
LYMPHOCYTES NFR BLD AUTO: 23 % (ref 27–41)
MCH RBC QN AUTO: 28.8 PG (ref 27–31)
MCH RBC QN AUTO: 29.4 PG (ref 27–31)
MCHC RBC AUTO-ENTMCNC: 33.3 G/DL (ref 32–36)
MCHC RBC AUTO-ENTMCNC: 33.5 G/DL (ref 32–36)
MCV RBC AUTO: 86.4 FL (ref 80–96)
MCV RBC AUTO: 87.7 FL (ref 80–96)
MONOCYTES # BLD AUTO: 0.41 K/UL (ref 0–0.8)
MONOCYTES # BLD AUTO: 0.51 K/UL (ref 0–0.8)
MONOCYTES NFR BLD AUTO: 5.8 % (ref 2–6)
MONOCYTES NFR BLD AUTO: 6.7 % (ref 2–6)
MPC BLD CALC-MCNC: 10.7 FL (ref 9.4–12.4)
MPC BLD CALC-MCNC: 9.5 FL (ref 9.4–12.4)
NEUTROPHILS # BLD AUTO: 4.96 K/UL (ref 1.8–7.7)
NEUTROPHILS # BLD AUTO: 5.13 K/UL (ref 1.8–7.7)
NEUTROPHILS NFR BLD AUTO: 67.2 % (ref 53–65)
NEUTROPHILS NFR BLD AUTO: 70.3 % (ref 53–65)
NITRITE UR QL STRIP: NEGATIVE
NRBC # BLD AUTO: 0 X10E3/UL
NRBC, AUTO (.00): 0 %
PH UR STRIP: 5.5 PH UNITS
PLATELET # BLD AUTO: 188 K/UL (ref 150–400)
PLATELET # BLD AUTO: 200 K/UL (ref 150–400)
POTASSIUM SERPL-SCNC: 4.3 MMOL/L (ref 3.5–5.1)
POTASSIUM SERPL-SCNC: 4.5 MMOL/L (ref 3.5–5.1)
PROT UR QL STRIP: NEGATIVE
RBC # BLD AUTO: 3.67 M/UL (ref 4.2–5.4)
RBC # BLD AUTO: 3.68 M/UL (ref 4.2–5.4)
RBC # UR STRIP: NEGATIVE /UL
SODIUM SERPL-SCNC: 138 MMOL/L (ref 136–145)
SODIUM SERPL-SCNC: 138 MMOL/L (ref 136–145)
SP GR UR STRIP: 1.01
UROBILINOGEN UR STRIP-ACNC: 0.2 MG/DL
WBC # BLD AUTO: 7.06 K/UL (ref 4.5–11)
WBC # BLD AUTO: 7.62 K/UL (ref 4.5–11)

## 2022-09-15 PROCEDURE — 97535 SELF CARE MNGMENT TRAINING: CPT

## 2022-09-15 PROCEDURE — 97165 OT EVAL LOW COMPLEX 30 MIN: CPT

## 2022-09-15 PROCEDURE — 80048 BASIC METABOLIC PNL TOTAL CA: CPT | Performed by: FAMILY MEDICINE

## 2022-09-15 PROCEDURE — 25000003 PHARM REV CODE 250: Performed by: INTERNAL MEDICINE

## 2022-09-15 PROCEDURE — 85025 COMPLETE CBC W/AUTO DIFF WBC: CPT | Performed by: FAMILY MEDICINE

## 2022-09-15 PROCEDURE — 36415 COLL VENOUS BLD VENIPUNCTURE: CPT | Performed by: FAMILY MEDICINE

## 2022-09-15 PROCEDURE — 63600175 PHARM REV CODE 636 W HCPCS: Performed by: ORTHOPAEDIC SURGERY

## 2022-09-15 PROCEDURE — 36415 COLL VENOUS BLD VENIPUNCTURE: CPT | Performed by: ORTHOPAEDIC SURGERY

## 2022-09-15 PROCEDURE — 82962 GLUCOSE BLOOD TEST: CPT

## 2022-09-15 PROCEDURE — 80048 BASIC METABOLIC PNL TOTAL CA: CPT | Performed by: ORTHOPAEDIC SURGERY

## 2022-09-15 PROCEDURE — 99900035 HC TECH TIME PER 15 MIN (STAT)

## 2022-09-15 PROCEDURE — 63600175 PHARM REV CODE 636 W HCPCS: Performed by: STUDENT IN AN ORGANIZED HEALTH CARE EDUCATION/TRAINING PROGRAM

## 2022-09-15 PROCEDURE — 94761 N-INVAS EAR/PLS OXIMETRY MLT: CPT

## 2022-09-15 PROCEDURE — 85025 COMPLETE CBC W/AUTO DIFF WBC: CPT | Performed by: ORTHOPAEDIC SURGERY

## 2022-09-15 PROCEDURE — 25000003 PHARM REV CODE 250: Performed by: ORTHOPAEDIC SURGERY

## 2022-09-15 PROCEDURE — 81003 URINALYSIS AUTO W/O SCOPE: CPT | Performed by: INTERNAL MEDICINE

## 2022-09-15 PROCEDURE — 63600175 PHARM REV CODE 636 W HCPCS: Performed by: INTERNAL MEDICINE

## 2022-09-15 PROCEDURE — 97110 THERAPEUTIC EXERCISES: CPT

## 2022-09-15 PROCEDURE — 97116 GAIT TRAINING THERAPY: CPT

## 2022-09-15 PROCEDURE — 97161 PT EVAL LOW COMPLEX 20 MIN: CPT

## 2022-09-15 PROCEDURE — 99214 PR OFFICE/OUTPT VISIT, EST, LEVL IV, 30-39 MIN: ICD-10-PCS | Mod: ,,, | Performed by: STUDENT IN AN ORGANIZED HEALTH CARE EDUCATION/TRAINING PROGRAM

## 2022-09-15 PROCEDURE — 99214 OFFICE O/P EST MOD 30 MIN: CPT | Mod: ,,, | Performed by: STUDENT IN AN ORGANIZED HEALTH CARE EDUCATION/TRAINING PROGRAM

## 2022-09-15 PROCEDURE — 11000004 HC SNF PRIVATE

## 2022-09-15 RX ORDER — ONDANSETRON HYDROCHLORIDE 8 MG/1
8 TABLET, FILM COATED ORAL EVERY 8 HOURS PRN
COMMUNITY

## 2022-09-15 RX ORDER — AMOXICILLIN 250 MG
1 CAPSULE ORAL 2 TIMES DAILY
Status: DISCONTINUED | OUTPATIENT
Start: 2022-09-15 | End: 2022-09-30 | Stop reason: HOSPADM

## 2022-09-15 RX ORDER — ACETAMINOPHEN 325 MG/1
650 TABLET ORAL EVERY 4 HOURS PRN
COMMUNITY

## 2022-09-15 RX ORDER — MUPIROCIN 20 MG/G
OINTMENT TOPICAL 2 TIMES DAILY
Status: DISCONTINUED | OUTPATIENT
Start: 2022-09-15 | End: 2022-09-15

## 2022-09-15 RX ORDER — POLYETHYLENE GLYCOL 3350 17 G/17G
17 POWDER, FOR SOLUTION ORAL DAILY
Status: DISCONTINUED | OUTPATIENT
Start: 2022-09-16 | End: 2022-09-16

## 2022-09-15 RX ORDER — OXYCODONE AND ACETAMINOPHEN 10; 325 MG/1; MG/1
1 TABLET ORAL EVERY 6 HOURS PRN
Status: DISCONTINUED | OUTPATIENT
Start: 2022-09-15 | End: 2022-09-15

## 2022-09-15 RX ORDER — AMLODIPINE BESYLATE 2.5 MG/1
5 TABLET ORAL DAILY
Status: DISCONTINUED | OUTPATIENT
Start: 2022-09-16 | End: 2022-09-30 | Stop reason: HOSPADM

## 2022-09-15 RX ORDER — METOPROLOL TARTRATE 25 MG/1
25 TABLET, FILM COATED ORAL 2 TIMES DAILY
Status: DISCONTINUED | OUTPATIENT
Start: 2022-09-16 | End: 2022-09-30 | Stop reason: HOSPADM

## 2022-09-15 RX ORDER — ACETAMINOPHEN 325 MG/1
650 TABLET ORAL EVERY 6 HOURS PRN
Status: DISCONTINUED | OUTPATIENT
Start: 2022-09-15 | End: 2022-09-15

## 2022-09-15 RX ORDER — NAPROXEN SODIUM 220 MG/1
325 TABLET, FILM COATED ORAL DAILY
Status: DISCONTINUED | OUTPATIENT
Start: 2022-09-16 | End: 2022-09-20

## 2022-09-15 RX ORDER — PREGABALIN 75 MG/1
75 CAPSULE ORAL 2 TIMES DAILY
Status: DISCONTINUED | OUTPATIENT
Start: 2022-09-15 | End: 2022-09-15

## 2022-09-15 RX ORDER — OXYCODONE HYDROCHLORIDE 5 MG/1
5 TABLET ORAL EVERY 4 HOURS PRN
Status: DISCONTINUED | OUTPATIENT
Start: 2022-09-15 | End: 2022-09-16

## 2022-09-15 RX ORDER — PREGABALIN 75 MG/1
75 CAPSULE ORAL 2 TIMES DAILY
Status: DISCONTINUED | OUTPATIENT
Start: 2022-09-15 | End: 2022-09-30 | Stop reason: HOSPADM

## 2022-09-15 RX ORDER — FAMOTIDINE 20 MG/1
20 TABLET, FILM COATED ORAL 2 TIMES DAILY
COMMUNITY
Start: 2022-09-16

## 2022-09-15 RX ORDER — SODIUM CHLORIDE 0.9 % (FLUSH) 0.9 %
10 SYRINGE (ML) INJECTION
Status: DISCONTINUED | OUTPATIENT
Start: 2022-09-15 | End: 2022-09-16

## 2022-09-15 RX ORDER — INSULIN ASPART 100 [IU]/ML
1-10 INJECTION, SOLUTION INTRAVENOUS; SUBCUTANEOUS
Status: DISCONTINUED | OUTPATIENT
Start: 2022-09-15 | End: 2022-09-16

## 2022-09-15 RX ORDER — CELECOXIB 100 MG/1
200 CAPSULE ORAL 2 TIMES DAILY
Status: DISCONTINUED | OUTPATIENT
Start: 2022-09-15 | End: 2022-09-15

## 2022-09-15 RX ORDER — METOPROLOL TARTRATE 50 MG/1
50 TABLET ORAL 2 TIMES DAILY
Status: DISCONTINUED | OUTPATIENT
Start: 2022-09-15 | End: 2022-09-15

## 2022-09-15 RX ORDER — IBUPROFEN 200 MG
16 TABLET ORAL
Status: DISCONTINUED | OUTPATIENT
Start: 2022-09-15 | End: 2022-09-16

## 2022-09-15 RX ORDER — PREGABALIN 75 MG/1
75 CAPSULE ORAL 2 TIMES DAILY
Status: ON HOLD | COMMUNITY
Start: 2022-09-15 | End: 2022-09-30 | Stop reason: CLARIF

## 2022-09-15 RX ORDER — ASPIRIN 325 MG
325 TABLET ORAL DAILY
Status: DISCONTINUED | OUTPATIENT
Start: 2022-09-16 | End: 2022-09-15

## 2022-09-15 RX ORDER — CELECOXIB 100 MG/1
200 CAPSULE ORAL 2 TIMES DAILY
Status: DISCONTINUED | OUTPATIENT
Start: 2022-09-15 | End: 2022-09-30 | Stop reason: HOSPADM

## 2022-09-15 RX ORDER — DOCUSATE SODIUM 100 MG/1
100 CAPSULE, LIQUID FILLED ORAL EVERY 12 HOURS
Status: DISCONTINUED | OUTPATIENT
Start: 2022-09-15 | End: 2022-09-16

## 2022-09-15 RX ORDER — MUPIROCIN 20 MG/G
OINTMENT TOPICAL 2 TIMES DAILY
Status: ON HOLD | COMMUNITY
Start: 2022-09-15 | End: 2022-09-30

## 2022-09-15 RX ORDER — ONDANSETRON 4 MG/1
8 TABLET, ORALLY DISINTEGRATING ORAL EVERY 8 HOURS PRN
Status: DISCONTINUED | OUTPATIENT
Start: 2022-09-15 | End: 2022-09-15

## 2022-09-15 RX ORDER — BISACODYL 10 MG
10 SUPPOSITORY, RECTAL RECTAL DAILY PRN
Status: DISCONTINUED | OUTPATIENT
Start: 2022-09-15 | End: 2022-09-16

## 2022-09-15 RX ORDER — TALC
6 POWDER (GRAM) TOPICAL NIGHTLY PRN
Status: DISCONTINUED | OUTPATIENT
Start: 2022-09-15 | End: 2022-09-15

## 2022-09-15 RX ORDER — IBUPROFEN 200 MG
24 TABLET ORAL
Status: DISCONTINUED | OUTPATIENT
Start: 2022-09-15 | End: 2022-09-16

## 2022-09-15 RX ORDER — AMOXICILLIN 250 MG
1 CAPSULE ORAL 2 TIMES DAILY
Status: DISCONTINUED | OUTPATIENT
Start: 2022-09-15 | End: 2022-09-15

## 2022-09-15 RX ORDER — CELECOXIB 200 MG/1
200 CAPSULE ORAL 2 TIMES DAILY
Qty: 60 CAPSULE | Refills: 2 | Status: SHIPPED | OUTPATIENT
Start: 2022-09-15 | End: 2023-04-03

## 2022-09-15 RX ORDER — OXYCODONE HYDROCHLORIDE 5 MG/1
10 TABLET ORAL EVERY 4 HOURS PRN
Status: DISCONTINUED | OUTPATIENT
Start: 2022-09-15 | End: 2022-09-19

## 2022-09-15 RX ORDER — METOPROLOL TARTRATE 25 MG/1
25 TABLET, FILM COATED ORAL 2 TIMES DAILY
Status: ON HOLD | COMMUNITY
Start: 2022-09-16 | End: 2022-09-30 | Stop reason: CLARIF

## 2022-09-15 RX ORDER — ASPIRIN 325 MG
325 TABLET ORAL DAILY
COMMUNITY
Start: 2022-09-16

## 2022-09-15 RX ORDER — OXYCODONE HYDROCHLORIDE 5 MG/1
10 TABLET ORAL EVERY 4 HOURS PRN
Status: DISCONTINUED | OUTPATIENT
Start: 2022-09-15 | End: 2022-09-15 | Stop reason: HOSPADM

## 2022-09-15 RX ORDER — CALCIUM CARBONATE 200(500)MG
500 TABLET,CHEWABLE ORAL 2 TIMES DAILY PRN
Status: DISCONTINUED | OUTPATIENT
Start: 2022-09-15 | End: 2022-09-30 | Stop reason: HOSPADM

## 2022-09-15 RX ORDER — POLYETHYLENE GLYCOL 3350 17 G/17G
17 POWDER, FOR SOLUTION ORAL DAILY
Status: DISCONTINUED | OUTPATIENT
Start: 2022-09-17 | End: 2022-09-15

## 2022-09-15 RX ORDER — ASPIRIN 81 MG/1
81 TABLET ORAL 2 TIMES DAILY
Qty: 60 TABLET | Refills: 0 | Status: ON HOLD | OUTPATIENT
Start: 2022-09-15 | End: 2022-09-15

## 2022-09-15 RX ORDER — AMOXICILLIN 250 MG
1 CAPSULE ORAL 2 TIMES DAILY
Qty: 60 TABLET | Refills: 2 | Status: SHIPPED | OUTPATIENT
Start: 2022-09-15

## 2022-09-15 RX ORDER — ONDANSETRON 4 MG/1
8 TABLET, FILM COATED ORAL EVERY 8 HOURS PRN
Status: DISCONTINUED | OUTPATIENT
Start: 2022-09-15 | End: 2022-09-15

## 2022-09-15 RX ORDER — MUPIROCIN 20 MG/G
1 OINTMENT TOPICAL 2 TIMES DAILY
Status: COMPLETED | OUTPATIENT
Start: 2022-09-15 | End: 2022-09-18

## 2022-09-15 RX ORDER — ACETAMINOPHEN 325 MG/1
650 TABLET ORAL EVERY 4 HOURS PRN
Status: DISCONTINUED | OUTPATIENT
Start: 2022-09-15 | End: 2022-09-30 | Stop reason: HOSPADM

## 2022-09-15 RX ORDER — OXYCODONE AND ACETAMINOPHEN 10; 325 MG/1; MG/1
1 TABLET ORAL EVERY 6 HOURS PRN
Qty: 30 TABLET | Refills: 0 | Status: ON HOLD | OUTPATIENT
Start: 2022-09-15 | End: 2022-09-30

## 2022-09-15 RX ORDER — VALSARTAN 320 MG/1
320 TABLET ORAL DAILY
Status: ON HOLD | COMMUNITY
Start: 2022-09-16 | End: 2022-09-30 | Stop reason: CLARIF

## 2022-09-15 RX ORDER — ONDANSETRON 4 MG/1
8 TABLET, ORALLY DISINTEGRATING ORAL EVERY 8 HOURS PRN
Qty: 30 TABLET | Refills: 0 | Status: SHIPPED | OUTPATIENT
Start: 2022-09-15

## 2022-09-15 RX ORDER — AMLODIPINE BESYLATE 2.5 MG/1
5 TABLET ORAL DAILY
Status: DISCONTINUED | OUTPATIENT
Start: 2022-09-16 | End: 2022-09-15

## 2022-09-15 RX ORDER — SODIUM CHLORIDE 0.9 % (FLUSH) 0.9 %
3 SYRINGE (ML) INJECTION EVERY 6 HOURS PRN
Status: DISCONTINUED | OUTPATIENT
Start: 2022-09-15 | End: 2022-09-16

## 2022-09-15 RX ORDER — ZOLPIDEM TARTRATE 5 MG/1
5 TABLET ORAL NIGHTLY PRN
Status: DISCONTINUED | OUTPATIENT
Start: 2022-09-15 | End: 2022-09-30 | Stop reason: HOSPADM

## 2022-09-15 RX ORDER — FAMOTIDINE 20 MG/1
20 TABLET, FILM COATED ORAL 2 TIMES DAILY
Status: DISCONTINUED | OUTPATIENT
Start: 2022-09-15 | End: 2022-09-30 | Stop reason: HOSPADM

## 2022-09-15 RX ORDER — IPRATROPIUM BROMIDE AND ALBUTEROL SULFATE 2.5; .5 MG/3ML; MG/3ML
3 SOLUTION RESPIRATORY (INHALATION) ONCE AS NEEDED
Status: DISCONTINUED | OUTPATIENT
Start: 2022-09-15 | End: 2022-09-30 | Stop reason: HOSPADM

## 2022-09-15 RX ORDER — ACETAMINOPHEN 325 MG/1
650 TABLET ORAL EVERY 4 HOURS PRN
Status: DISCONTINUED | OUTPATIENT
Start: 2022-09-15 | End: 2022-09-16

## 2022-09-15 RX ORDER — POLYETHYLENE GLYCOL 3350 17 G/17G
17 POWDER, FOR SOLUTION ORAL DAILY
COMMUNITY
Start: 2022-09-16

## 2022-09-15 RX ORDER — FAMOTIDINE 20 MG/1
20 TABLET, FILM COATED ORAL 2 TIMES DAILY
Status: DISCONTINUED | OUTPATIENT
Start: 2022-09-15 | End: 2022-09-15

## 2022-09-15 RX ORDER — ONDANSETRON 4 MG/1
8 TABLET, ORALLY DISINTEGRATING ORAL EVERY 8 HOURS PRN
Status: DISCONTINUED | OUTPATIENT
Start: 2022-09-15 | End: 2022-09-30 | Stop reason: HOSPADM

## 2022-09-15 RX ORDER — METOPROLOL TARTRATE 25 MG/1
25 TABLET, FILM COATED ORAL 2 TIMES DAILY
Status: DISCONTINUED | OUTPATIENT
Start: 2022-09-15 | End: 2022-09-15

## 2022-09-15 RX ORDER — VALSARTAN 320 MG/1
320 TABLET ORAL DAILY
Status: DISCONTINUED | OUTPATIENT
Start: 2022-09-16 | End: 2022-09-15

## 2022-09-15 RX ORDER — SODIUM CHLORIDE 0.9 % (FLUSH) 0.9 %
3 SYRINGE (ML) INJECTION EVERY 6 HOURS PRN
Status: DISCONTINUED | OUTPATIENT
Start: 2022-09-15 | End: 2022-09-15 | Stop reason: HOSPADM

## 2022-09-15 RX ORDER — VALSARTAN 320 MG/1
320 TABLET ORAL DAILY
Status: DISCONTINUED | OUTPATIENT
Start: 2022-09-17 | End: 2022-09-15

## 2022-09-15 RX ORDER — LOSARTAN POTASSIUM 100 MG/1
100 TABLET ORAL DAILY
Status: DISCONTINUED | OUTPATIENT
Start: 2022-09-16 | End: 2022-09-30 | Stop reason: HOSPADM

## 2022-09-15 RX ORDER — HYDROMORPHONE HYDROCHLORIDE 2 MG/ML
0.5 INJECTION, SOLUTION INTRAMUSCULAR; INTRAVENOUS; SUBCUTANEOUS ONCE AS NEEDED
Status: COMPLETED | OUTPATIENT
Start: 2022-09-15 | End: 2022-09-15

## 2022-09-15 RX ORDER — GLUCAGON 1 MG
1 KIT INJECTION
Status: DISCONTINUED | OUTPATIENT
Start: 2022-09-15 | End: 2022-09-16

## 2022-09-15 RX ORDER — ZOLPIDEM TARTRATE 5 MG/1
5 TABLET ORAL NIGHTLY PRN
Status: ON HOLD | COMMUNITY
End: 2022-09-30

## 2022-09-15 RX ORDER — DOCUSATE SODIUM 100 MG/1
100 CAPSULE, LIQUID FILLED ORAL EVERY 12 HOURS
Status: DISCONTINUED | OUTPATIENT
Start: 2022-09-15 | End: 2022-09-15 | Stop reason: HOSPADM

## 2022-09-15 RX ORDER — ZOLPIDEM TARTRATE 5 MG/1
5 TABLET ORAL NIGHTLY PRN
Status: DISCONTINUED | OUTPATIENT
Start: 2022-09-15 | End: 2022-09-15

## 2022-09-15 RX ADMIN — MUPIROCIN 1 G: 20 OINTMENT TOPICAL at 09:09

## 2022-09-15 RX ADMIN — SENNOSIDES AND DOCUSATE SODIUM 1 TABLET: 50; 8.6 TABLET ORAL at 09:09

## 2022-09-15 RX ADMIN — OXYCODONE HYDROCHLORIDE 10 MG: 5 TABLET ORAL at 09:09

## 2022-09-15 RX ADMIN — CEFAZOLIN 2 G: 10 INJECTION, POWDER, FOR SOLUTION INTRAVENOUS; PARENTERAL at 12:09

## 2022-09-15 RX ADMIN — PREGABALIN 75 MG: 75 CAPSULE ORAL at 09:09

## 2022-09-15 RX ADMIN — OXYCODONE HYDROCHLORIDE 5 MG: 5 TABLET ORAL at 05:09

## 2022-09-15 RX ADMIN — OXYCODONE HYDROCHLORIDE 10 MG: 5 TABLET ORAL at 12:09

## 2022-09-15 RX ADMIN — ACETAMINOPHEN 650 MG: 325 TABLET ORAL at 08:09

## 2022-09-15 RX ADMIN — ASPIRIN 81 MG CHEWABLE TABLET 324 MG: 81 TABLET CHEWABLE at 08:09

## 2022-09-15 RX ADMIN — DOCUSATE SODIUM 100 MG: 100 CAPSULE, LIQUID FILLED ORAL at 08:09

## 2022-09-15 RX ADMIN — CELECOXIB 200 MG: 100 CAPSULE ORAL at 09:09

## 2022-09-15 RX ADMIN — PREGABALIN 75 MG: 75 CAPSULE ORAL at 08:09

## 2022-09-15 RX ADMIN — DOCUSATE SODIUM 100 MG: 100 CAPSULE, LIQUID FILLED ORAL at 09:09

## 2022-09-15 RX ADMIN — AMLODIPINE BESYLATE 5 MG: 5 TABLET ORAL at 08:09

## 2022-09-15 RX ADMIN — ONDANSETRON 8 MG: 4 TABLET, ORALLY DISINTEGRATING ORAL at 08:09

## 2022-09-15 RX ADMIN — METOPROLOL TARTRATE 25 MG: 25 TABLET, FILM COATED ORAL at 08:09

## 2022-09-15 RX ADMIN — OXYCODONE HYDROCHLORIDE 10 MG: 5 TABLET ORAL at 05:09

## 2022-09-15 RX ADMIN — CELECOXIB 200 MG: 100 CAPSULE ORAL at 08:09

## 2022-09-15 RX ADMIN — INSULIN DETEMIR 62 UNITS: 100 INJECTION, SOLUTION SUBCUTANEOUS at 09:09

## 2022-09-15 RX ADMIN — HYDROMORPHONE HYDROCHLORIDE 0.5 MG: 2 INJECTION INTRAMUSCULAR; INTRAVENOUS; SUBCUTANEOUS at 10:09

## 2022-09-15 RX ADMIN — FAMOTIDINE 20 MG: 20 TABLET ORAL at 08:09

## 2022-09-15 RX ADMIN — FAMOTIDINE 20 MG: 20 TABLET, FILM COATED ORAL at 09:09

## 2022-09-15 RX ADMIN — POLYETHYLENE GLYCOL 3350 17 G: 17 POWDER, FOR SOLUTION ORAL at 08:09

## 2022-09-15 RX ADMIN — VALSARTAN 320 MG: 320 TABLET, FILM COATED ORAL at 08:09

## 2022-09-15 NOTE — NURSING
At 1330 pt is discharged to private car with  to be admitted to [facility], Rxs was delivered to room from our pharmacy earlier.

## 2022-09-15 NOTE — PLAN OF CARE
[facility]  Discharge Final Note    Primary Care Provider: Abimbola Fischer MD    Expected Discharge Date: 9/15/2022    Final Discharge Note (most recent)       Final Note - 09/15/22 1106          Final Note    Assessment Type Final Discharge Note     Anticipated Discharge Disposition Swing Bed   [facility]    What phone number can be called within the next 1-3 days to see how you are doing after discharge? [phone]        Post-Acute Status    Post-Acute Authorization Placement     Post-Acute Placement Status Set-up Complete/Auth obtained     Patient choice form signed by patient/caregiver List with quality metrics by geographic area provided;List from CMS Compare     Discharge Delays None known at this time                     Important Message from Medicare             After-discharge care                Destination       [facility]   Service: Skilled Nursing    [address] [place] [address]   Phone: [phone]                     Pt to dc to [facility] today for swb. No other needs.

## 2022-09-15 NOTE — PT/OT/SLP PROGRESS
Physical Therapy Treatment    Patient Name:  Jonna Cunha   MRN:  [id number]    Recommendations:     Discharge Recommendations:  rehabilitation facility   Discharge Equipment Recommendations: bedside commode   Barriers to discharge: None    Assessment:     Jonna Cunha is a 67 y.o. female admitted with a medical diagnosis of Acute pain of left knee.  She presents with the following impairments/functional limitations:  pain, gait instability, decreased ROM Patient with a lot of subjective pain today. Patient ambulating in halls and initial rom doing well. Plan in to go to swingbed today..    Rehab Prognosis: Good; patient would benefit from acute skilled PT services to address these deficits and reach maximum level of function.    Recent Surgery: Procedure(s) (LRB):  ARTHROPLASTY, KNEE, TOTAL, USING COMPUTER-ASSISTED NAVIGATION (Left) 1 Day Post-Op    Plan:     During this hospitalization, patient to be seen BID to address the identified rehab impairments via gait training, therapeutic activities and progress toward the following goals:    Plan of Care Expires:  10/14/22    Subjective     Chief Complaint: left knee pain  Patient/Family Comments/goals: Patient states she is going to sb today  Pain/Comfort:  Pain Rating 1: 10/10  Location - Side 1: Left  Location 1: knee  Pain Addressed 1: Cessation of Activity      Objective:     Communicated with nurse prior to session.  Patient found supine with peripheral IV upon PT entry to room.     General Precautions: Standard, fall   Orthopedic Precautions:LLE weight bearing as tolerated   Braces:    Respiratory Status: Room air     Functional Mobility:  Bed Mobility:     Scooting: independence  Transfers:     Sit to Stand:  contact guard assistance with rolling walker  Gait: ambulated 120 feet with rolling walker sba, limited by fatigue      AM-PAC 6 CLICK MOBILITY  Turning over in bed (including adjusting bedclothes, sheets and blankets)?: 3  Sitting down on and standing  up from a chair with arms (e.g., wheelchair, bedside commode, etc.): 3  Moving from lying on back to sitting on the side of the bed?: 3  Moving to and from a bed to a chair (including a wheelchair)?: 3  Need to walk in hospital room?: 3  Climbing 3-5 steps with a railing?: 1  Basic Mobility Total Score: 16       Therapeutic Activities and Exercises:   LLE: aps, qs, slr, saq 4x10, 15 passive flexion stretches   LLE: 0-80 knee flexion prom pain end feel      Patient left up in chair with call button in reach and  present..    GOALS:   Multidisciplinary Problems       Physical Therapy Goals          Problem: Physical Therapy    Goal Priority Disciplines Outcome Goal Variances Interventions   Physical Therapy Goal     PT, PT/OT Ongoing, Progressing     Description: Short Term Goals to be met by: 2022    Patient will increase functional independence with mobility by performin. Supine to sit with Modified [place]  2. Sit to stand transfer with Modified [place]  3. Bed to chair transfer with Modified [place] using Rolling Walker, WBAT left LE  4. Gait  x 100 feet with Modified [place] using Rolling Walker, WBAT left LE.   5. Lower extremity exercise program x30 reps per handout, with assistance as needed  6. Knee ROM 0-90    Long Term Goals to be met by: 2022    Pt will regain full independent functional mobility to return to prior activities of daily living.                        Time Tracking:     PT Received On: 09/15/22  PT Start Time: 930     PT Stop Time: 1005  PT Total Time (min): 35 min     Billable Minutes: Gait Training 20 and Therapeutic Exercise 10    Treatment Type: Treatment  PT/PTA: PT     PTA Visit Number: 0     09/15/2022

## 2022-09-15 NOTE — PLAN OF CARE
Patient would benefit from skilled PT to address the deficits detailed in eval.  Nelda Silva, PT, DPT

## 2022-09-15 NOTE — H&P
The patient is a 67-year-old white  female admitted to swing bed status post total left knee replacement secondary to meniscus damage.  The patient underwent surgery on yesterday and was got up on her feet yesterday.  She was up today.  She is here for physical therapy and rehab.  She had no postop complications.    Past medical history:  See old chart     Review of systems:  12 point review of systems noncontributory except for present illness    Physical exam:  Patient is alert oriented no acute distress     HEENT:  Negative     Neck:  Supple no JVD bruits or masses     Lungs:  Clear     Heart:  No murmur gallop or rub     GI:  Soft bowel sounds normal nontender     Neurological:  No gross focal neurological deficits     Musculoskeletal:  Left knee postop change with swelling of her leg.  Neurovascular and motor function distally normal     Assessment:  Status post left total knee replacement     Plan:  Physical therapy rehab

## 2022-09-15 NOTE — PT/OT/SLP PROGRESS
Occupational Therapy   Treatment    Name: Jonna Cunha  MRN: [id number]  Admitting Diagnosis:  Acute pain of left knee  1 Day Post-Op    Recommendations:     Discharge Recommendations: nursing facility, skilled  Discharge Equipment Recommendations:  bedside commode  Barriers to discharge:  None    Assessment:     Jonna Cunha is a 67 y.o. female with a medical diagnosis of Acute pain of left knee.  She presents with s/p left TKR on 9/14/22. Performance deficits affecting function are impaired self care skills, impaired functional mobility, gait instability, impaired balance, pain. Pt with complaints of 9/10 pain at left knee upon OT arrival; however pt agreeable to participate in OT tx. Pt with Fausto to perform LB dressing and required increased verbal cueing for safety with ADL t/f.     Rehab Prognosis:  Good; patient would benefit from acute skilled OT services to address these deficits and reach maximum level of function.       Plan:     Patient to be seen 5 x/week to address the above listed problems via self-care/home management, therapeutic activities, therapeutic exercises  Plan of Care Expires: 09/28/22  Plan of Care Reviewed with: patient    Subjective     Pain/Comfort:  Pain Rating 1: 9/10  Location - Side 1: Left  Location 1: knee  Pain Addressed 1: Pre-medicate for activity    Objective:     Communicated with: CORRINE Romo prior to session.  Patient found supine with cryotherapy, peripheral IV upon OT entry to room.    General Precautions: Standard,     Orthopedic Precautions:LLE weight bearing as tolerated   Braces:    Respiratory Status: Room air     Occupational Performance:     Bed Mobility:    Patient completed Supine to Sit with minimum assistance     Functional Mobility/Transfers:  Patient completed Sit <> Stand Transfer with contact guard assistance  with  rolling walker   Patient completed Bed <> Chair Transfer using Step Transfer technique with contact guard assistance with rolling  walker  Patient completed Toilet Transfer Step Transfer technique with contact guard assistance with  rolling walker  Functional Mobility: pt performed functional mobility to [facility] with RW with CGA    Activities of Daily Living:  Upper Body Dressing: modified independence to maria g shirt  Lower Body Dressing: minimum assistance to maria g underwear and pants  Toileting: contact guard assistance to perform all aspects of toileting at [facility] 6 Click ADL:      Treatment & Education:  Pt completed ADL training as listed above.     Patient left up in chair with all lines intact and call button in reach    GOALS:   Multidisciplinary Problems       Occupational Therapy Goals          Problem: Occupational Therapy    Goal Priority Disciplines Outcome Interventions   Occupational Therapy Goal     OT, PT/OT Ongoing, Progressing    Description: ST.Pt will perform bathing with Fausto with setup at EOB  2.Pt will perform UE dressing with Nydia  3.Pt will perform LE dressing with Fausto  4.Pt will transfer bed/chair/bsc with SBA with RW  5.Pt will perform standing task x 2 min with SBA with RW  6.Tolerate 15 min of tx without fatigue.      LTG:   Restore to max I with selfcare and mobility.                           Time Tracking:     OT Date of Treatment: 09/15/22  OT Start Time: 902  OT Stop Time: 921  OT Total Time (min): 19 min    Billable Minutes:Self Care/Home Management 19 minutes    OT/ERROL: OT          9/15/2022

## 2022-09-15 NOTE — PLAN OF CARE
Description: Grooming Status:   Short Term Goal: Pt will perform grooming with s/u sitting EOB.   Long Term Goal: Pt will perform grooming/oral hygiene standing at sink with Mod I      LE dressing Status:   Short Term Goal: Pt will perform LE dressing with mod a.   Long Term Goal: Pt will perform LE dressing with min a.    Toileting Status:   Short Term Goal: Pt will perform toilet hygiene on BSC with s/u.  Long Term Goal: Pt will perform toilet hygiene on toilet with no AE with Mod I.    Commode Transfer:   Short Term Goal: Pt will perform BSC t/f with s/u.  Long Term Goal:  Pt will perform toilet t/f in bathroom with Mod I.     Bathing Status:   Long Term Goal: Pt will perform sponge bath with s/u with no unsafe fatigue.     Strength Status:   Long Term Goal: Pt to perform BUE strengthening with weights and/or body weight to increase ADL independence and safety    Endurance Status:   Short Term Goal:pt to perform 15 min OT treatment with 5 or greater rest breaks  Long Term Goal: pt to perform 30 min OT treat with 3 or less rest breaks

## 2022-09-15 NOTE — SUBJECTIVE & OBJECTIVE
Interval History: NAEO. Glucose, BP well controlled    Review of Systems   Constitutional:  Negative for chills, fatigue, fever and unexpected weight change.   HENT:  Negative for congestion, mouth sores and sore throat.    Eyes:  Negative for photophobia and visual disturbance.   Respiratory:  Negative for cough, chest tightness, shortness of breath and wheezing.    Cardiovascular:  Negative for chest pain, palpitations and leg swelling.   Gastrointestinal:  Negative for abdominal pain, diarrhea, nausea and vomiting.   Endocrine: Negative for cold intolerance and heat intolerance.   Genitourinary:  Negative for difficulty urinating, dysuria, frequency and urgency.   Musculoskeletal:  Negative for arthralgias, back pain and myalgias.   Skin:  Negative for pallor and rash.   Neurological:  Negative for tremors, seizures, syncope, weakness, numbness and headaches.   Hematological:  Does not bruise/bleed easily.   Psychiatric/Behavioral:  Negative for agitation, confusion, hallucinations and suicidal ideas.    All other systems reviewed and are negative.  Objective:     Vital Signs (Most Recent):  Temp: 97.3 °F (36.3 °C) (09/15/22 0400)  Pulse: 83 (09/15/22 0816)  Resp: 20 (09/15/22 0951)  BP: (!) 161/69 (09/15/22 0816)  SpO2: 97 % (09/15/22 0400)   Vital Signs (24h Range):  Temp:  [97.3 °F (36.3 °C)-98.6 °F (37 °C)] 97.3 °F (36.3 °C)  Pulse:  [74-95] 83  Resp:  [12-20] 20  SpO2:  [94 %-100 %] 97 %  BP: (117-161)/(55-76) 161/69     Weight: 86.1 kg (189 lb 12.8 oz)  Body mass index is 31.58 kg/m².    Intake/Output Summary (Last 24 hours) at 9/15/2022 1006  Last data filed at 9/15/2022 0057  Gross per 24 hour   Intake 850 ml   Output --   Net 850 ml      Physical Exam  Vitals reviewed.   Constitutional:       Appearance: Normal appearance.   HENT:      Head: Normocephalic and atraumatic.      Nose: Nose normal.   Eyes:      Extraocular Movements: Extraocular movements intact.      Conjunctiva/sclera: Conjunctivae normal.    Neck:      Trachea: Trachea normal.   Cardiovascular:      Rate and Rhythm: Normal rate and regular rhythm.      Pulses: Normal pulses.      Heart sounds: Normal heart sounds.   Pulmonary:      Effort: Pulmonary effort is normal.      Breath sounds: Normal breath sounds and air entry.   Abdominal:      General: Bowel sounds are normal.      Palpations: Abdomen is soft.   Musculoskeletal:         General: Tenderness (left knee pain) present.      Cervical back: Neck supple.      Comments: Normal tone, moves all extremities   Skin:     General: Skin is warm and dry.   Neurological:      General: No focal deficit present.      Mental Status: She is alert and oriented to person, place, and time.      Cranial Nerves: Cranial nerves 2-12 are intact.      Comments: Grossly normal motor and sensory function without focal deficit appreciated.   Psychiatric:         Mood and Affect: Mood and affect normal.         Behavior: Behavior is cooperative.       Significant Labs: All pertinent labs within the past 24 hours have been reviewed.    Significant Imaging: I have reviewed all pertinent imaging results/findings within the past 24 hours.

## 2022-09-15 NOTE — PLAN OF CARE
SW spoke with TATO Coelho and pt is accepted for swb today. Pkt made and left with nurse. Pt informed of acceptance today. SW following.

## 2022-09-15 NOTE — PROGRESS NOTES
[facility] Medicine  Progress Note    Patient Name: Jonna Cunha  MRN: [id number]  Patient Class: OP- Outpatient Recovery   Admission Date: 9/14/2022  Length of Stay: 0 days  Attending Physician: Manuel Cruz MD  Primary Care Provider: Abimbola Fischer MD        Subjective:     Principal Problem:Acute pain of left knee        HPI:  [id number] with PMH of HTN, DM, arthritis who presents to [facility] for left total knee with Dr. Cruz.  There were no major complications encountered intraoperatively.  She was in her normal state of health prior to her procedure. Her only complaint at this time is left knee pain.  Medications were reviewed during our interview.  She has reasonably well controlled diabetes and is compliant with her home medications.  She has seen cardiology in the past for palpitations but with no formal diagnosis. Noted Nebivolol at home.  ROS otherwise negative.      Overview/Hospital Course:  No notes on file    Interval History: NAEO. Glucose, BP well controlled    Review of Systems   Constitutional:  Negative for chills, fatigue, fever and unexpected weight change.   HENT:  Negative for congestion, mouth sores and sore throat.    Eyes:  Negative for photophobia and visual disturbance.   Respiratory:  Negative for cough, chest tightness, shortness of breath and wheezing.    Cardiovascular:  Negative for chest pain, palpitations and leg swelling.   Gastrointestinal:  Negative for abdominal pain, diarrhea, nausea and vomiting.   Endocrine: Negative for cold intolerance and heat intolerance.   Genitourinary:  Negative for difficulty urinating, dysuria, frequency and urgency.   Musculoskeletal:  Negative for arthralgias, back pain and myalgias.   Skin:  Negative for pallor and rash.   Neurological:  Negative for tremors, seizures, syncope, weakness, numbness and headaches.   Hematological:  Does not bruise/bleed easily.   Psychiatric/Behavioral:  Negative for  agitation, confusion, hallucinations and suicidal ideas.    All other systems reviewed and are negative.  Objective:     Vital Signs (Most Recent):  Temp: 97.3 °F (36.3 °C) (09/15/22 0400)  Pulse: 83 (09/15/22 0816)  Resp: 20 (09/15/22 0951)  BP: (!) 161/69 (09/15/22 0816)  SpO2: 97 % (09/15/22 0400)   Vital Signs (24h Range):  Temp:  [97.3 °F (36.3 °C)-98.6 °F (37 °C)] 97.3 °F (36.3 °C)  Pulse:  [74-95] 83  Resp:  [12-20] 20  SpO2:  [94 %-100 %] 97 %  BP: (117-161)/(55-76) 161/69     Weight: 86.1 kg (189 lb 12.8 oz)  Body mass index is 31.58 kg/m².    Intake/Output Summary (Last 24 hours) at 9/15/2022 1006  Last data filed at 9/15/2022 0057  Gross per 24 hour   Intake 850 ml   Output --   Net 850 ml      Physical Exam  Vitals reviewed.   Constitutional:       Appearance: Normal appearance.   HENT:      Head: Normocephalic and atraumatic.      Nose: Nose normal.   Eyes:      Extraocular Movements: Extraocular movements intact.      Conjunctiva/sclera: Conjunctivae normal.   Neck:      Trachea: Trachea normal.   Cardiovascular:      Rate and Rhythm: Normal rate and regular rhythm.      Pulses: Normal pulses.      Heart sounds: Normal heart sounds.   Pulmonary:      Effort: Pulmonary effort is normal.      Breath sounds: Normal breath sounds and air entry.   Abdominal:      General: Bowel sounds are normal.      Palpations: Abdomen is soft.   Musculoskeletal:         General: Tenderness (left knee pain) present.      Cervical back: Neck supple.      Comments: Normal tone, moves all extremities   Skin:     General: Skin is warm and dry.   Neurological:      General: No focal deficit present.      Mental Status: She is alert and oriented to person, place, and time.      Cranial Nerves: Cranial nerves 2-12 are intact.      Comments: Grossly normal motor and sensory function without focal deficit appreciated.   Psychiatric:         Mood and Affect: Mood and affect normal.         Behavior: Behavior is cooperative.        Significant Labs: All pertinent labs within the past 24 hours have been reviewed.    Significant Imaging: I have reviewed all pertinent imaging results/findings within the past 24 hours.      Assessment/Plan:      * Acute pain of left knee  S/p total knee with Dr. Cruz      Essential hypertension  Will adjust meds as needed    Diabetes  With nephropathy  SSI and basal while in house  Hold Oral hypoglycemics while patient is in the hospital.  Resume home regimen at discharge    Arthritis  As above        VTE Risk Mitigation (From admission, onward)         Ordered     IP VTE LOW RISK PATIENT  Once         09/15/22 0748     Place sequential compression device  Until discontinued         09/15/22 0748     Place SELINA hose  Until discontinued         09/14/22 1126     Place sequential compression device  Until discontinued         09/14/22 1126                Discharge Planning   DOTTIE: 9/15/2022     Code Status: Prior   Is the patient medically ready for discharge?:     Reason for patient still in hospital (select all that apply): Treatment  Discharge Plan A: Other ([facility])                  Tony Caraballo DO  Department of Hospital Medicine   [facility]

## 2022-09-15 NOTE — PLAN OF CARE
Problem: Adult Inpatient Plan of Care  Goal: Plan of Care Review  Outcome: Ongoing, Progressing  Flowsheets (Taken 9/15/2022 0143)  Plan of Care Reviewed With: patient  Goal: Patient-Specific Goal (Individualized)  Outcome: Ongoing, Progressing  Flowsheets (Taken 9/15/2022 0143)  Anxieties, Fears or Concerns: pain control  Individualized Care Needs: pain control  Patient-Specific Goals (Include Timeframe): pain less than 4 by 7am  Goal: Absence of Hospital-Acquired Illness or Injury  Outcome: Ongoing, Progressing  Intervention: Identify and Manage Fall Risk  Flowsheets (Taken 9/15/2022 0143)  Safety Promotion/Fall Prevention: assistive device/personal item within reach  Intervention: Prevent Skin Injury  Flowsheets (Taken 9/15/2022 0143)  Body Position: turned  Skin Protection: adhesive use limited  Intervention: Prevent and Manage VTE (Venous Thromboembolism) Risk  Flowsheets (Taken 9/15/2022 0143)  Activity Management: Rolling - L1  VTE Prevention/Management: remove, assess skin, and reapply sequential compression device  Range of Motion: active ROM (range of motion) encouraged  Intervention: Prevent Infection  Flowsheets (Taken 9/15/2022 0143)  Infection Prevention:   hand hygiene promoted   personal protective equipment utilized   rest/sleep promoted   single patient room provided  Goal: Optimal Comfort and Wellbeing  Outcome: Ongoing, Progressing  Intervention: Monitor Pain and Promote Comfort  Flowsheets (Taken 9/15/2022 0143)  Pain Management Interventions:   care clustered   pain management plan reviewed with patient/caregiver  Intervention: Provide Person-Centered Care  Flowsheets (Taken 9/15/2022 0143)  Trust Relationship/Rapport: care explained  Goal: Readiness for Transition of Care  Outcome: Ongoing, Progressing

## 2022-09-15 NOTE — PT/OT/SLP EVAL
Occupational Therapy   Evaluation    Name: Jonna Cunha  MRN: [id number]  Admitting Diagnosis:  <principal problem not specified>  Recent Surgery: * No surgery found *      Recommendations:     Discharge Recommendations: outpatient PT  Discharge Equipment Recommendations:  none  Barriers to discharge:       Assessment:     Jonna Cunha is a 67 y.o. female with a medical diagnosis of <principal problem not specified>.  Performance deficits affecting function: impaired endurance, impaired self care skills, weakness, impaired functional mobility, impaired balance, decreased lower extremity function, decreased safety awareness, pain.      Rehab Prognosis: Good; patient would benefit from acute skilled OT services to address these deficits and reach maximum level of function.       Plan:     Patient to be seen 5 x/week to address the above listed problems via therapeutic exercises, self-care/home management, therapeutic activities  Plan of Care Expires:    Plan of Care Reviewed with: patient    Subjective     Chief Complaint: pain- nsg is aware  Patient/Family Comments/goals: Get stronger and go home    Occupational Profile:  Living Environment: lives with   Previous level of function: independent  Roles and Routines: self-care and home management  Equipment Used at Home:  walker, rolling  Assistance upon Discharge:  and children    Pain/Comfort:  Pain Rating 1: 9/10    Patients cultural, spiritual, Zoroastrian conflicts given the current situation:      Objective:     Communicated with: RN prior to session.  Patient found supine with   upon OT entry to room.    General Precautions: Standard, fall   Orthopedic Precautions:    Braces:    Respiratory Status: Room air    Occupational Performance:    Bed Mobility:    Patient completed Rolling/Turning to Left with  contact guard assistance    Functional Mobility/Transfers:  Patient completed Sit <> Stand Transfer with contact guard assistance  with  rolling  walker   Functional Mobility: min a with RW    Activities of Daily Living:  Feeding:  supervision snack    Cognitive/Visual Perceptual:  Cognitive/Psychosocial Skills:     -       Oriented to: Person, Place, Time, and Situation     Physical Exam:  Upper Extremity Range of Motion:     -       Right Upper Extremity: WFL  -       Left Upper Extremity: WFL  Upper Extremity Strength:    -       Right Upper Extremity: WFL  -       Left Upper Extremity: WFL    AMPAC 6 Click ADL:  AMPAC Total Score: 20    Treatment & Education:  Pt educated on OT role/POC.   Importance of OOB activity with staff assistance.  Importance of sitting up in the chair throughout the day as tolerated, especially for meals   Safety during functional t/f and mobility  Importance of assisting with self-care activities       Patient left supine with call button in reach    GOALS:   Multidisciplinary Problems       Occupational Therapy Goals          Problem: Occupational Therapy    Goal Priority Disciplines Outcome Interventions   Occupational Therapy Goal     OT, PT/OT     Description: Description: Grooming Status:   Short Term Goal: Pt will perform grooming with s/u sitting EOB.   Long Term Goal: Pt will perform grooming/oral hygiene standing at sink with Mod I      LE dressing Status:   Short Term Goal: Pt will perform LE dressing with mod a.   Long Term Goal: Pt will perform LE dressing with min a.    Toileting Status:   Short Term Goal: Pt will perform toilet hygiene on BSC with s/u.  Long Term Goal: Pt will perform toilet hygiene on toilet with no AE with Mod I.    Commode Transfer:   Short Term Goal: Pt will perform BSC t/f with s/u.  Long Term Goal:  Pt will perform toilet t/f in bathroom with Mod I.     Bathing Status:   Long Term Goal: Pt will perform sponge bath with s/u with no unsafe fatigue.     Strength Status:   Long Term Goal: Pt to perform BUE strengthening with weights and/or body weight to increase ADL independence and  safety    Endurance Status:   Short Term Goal:pt to perform 15 min OT treatment with 5 or greater rest breaks  Long Term Goal: pt to perform 30 min OT treat with 3 or less rest breaks                         History:     Past Medical History:   Diagnosis Date    Arthritis     Diabetes mellitus, type 2     Hypertension     Thyroid disease          Past Surgical History:   Procedure Laterality Date     SECTION      CHOLECYSTECTOMY      KNEE ARTHROSCOPY W/ MENISCECTOMY Left 2021    Procedure: ARTHROSCOPY, KNEE, WITH MENISCECTOMY;  Surgeon: Manuel Cruz MD;  Location: [facility];  Service: Orthopedics;  Laterality: Left;    REPAIR OF MENISCUS OF KNEE Left 2021    Procedure: REPAIR, MENISCUS, KNEE;  Surgeon: Manuel Cruz MD;  Location: [facility];  Service: Orthopedics;  Laterality: Left;    SPINE SURGERY      TUBAL LIGATION         Time Tracking:     OT Date of Treatment: 09/15/22  OT Start Time: 1440  OT Stop Time: 1450  OT Total Time (min): 10 min    Billable Minutes:Evaluation 10 min  [place] OTR/L      9/15/2022

## 2022-09-15 NOTE — PT/OT/SLP EVAL
Physical Therapy Evaluation    Patient Name:  Jonna Cunha   MRN:  [id number]    Recommendations:     Discharge Recommendations:  outpatient PT   Discharge Equipment Recommendations: none   Barriers to discharge: None    Assessment:     Jonna Cunha is a 67 y.o. female admitted with a medical diagnosis of <principal problem not specified>.  She presents with the following impairments/functional limitations:  impaired endurance, impaired functional mobility, gait instability, impaired balance, weakness, decreased lower extremity function, decreased ROM, pain. Patient's impairments have resulted in decrease safety and idependence with functional mobility and ADLs resulting in decreased ability to return home at this time.      Rehab Prognosis: Good; patient would benefit from acute skilled PT services to address these deficits and reach maximum level of function.    Recent Surgery: * No surgery found *      Plan:     During this hospitalization, patient to be seen BID to address the identified rehab impairments via gait training, therapeutic activities, therapeutic exercises and progress toward the following goals:    Plan of Care Expires:  10/06/22    Subjective     Chief Complaint: weakness, pain   Patient/Family Comments/goals: improve mobility and strength for safe return home   Pain/Comfort:  Pain Rating 1: 8/10  Location - Side 1: Left  Location 1: knee  Pain Addressed 1: Other (see comments) (nurse was present and received patient pain rating upon patient arrival to hospital)    Patients cultural, spiritual, Caodaism conflicts given the current situation: no    Living Environment:  Patient lives at home with her . There are no steps to enter their home.   Prior to admission, patients level of function was independent.  Equipment used at home: walker, rolling.  DME owned (not currently used): none.  Upon discharge, patient will have assistance from .    Objective:     Patient found  in her car  upon arrival to hospital .   General Precautions: Standard, fall   Orthopedic Precautions:LLE weight bearing as tolerated   Braces:    Respiratory Status: Room air    Exams:  LLE ROM: Deficits: post surgical deficits, immobilizer in place   LLE Strength: Deficits: 2-/5    Functional Mobility:  Bed Mobility:     Sit to Supine: minimum assistance  Transfers:     Sit to Stand:  contact guard assistance with rolling walker  Car Transfer: minimum assistance with  hand-held assist  using  Step Transfer  Gait: 5 feet with RW on level surface with min A   Balance: Fair     AM-PAC 6 CLICK MOBILITY  Total Score:16     Patient left HOB elevated with call button in reach and nursing staff and patient's  present.    GOALS:   Multidisciplinary Problems       Physical Therapy Goals       Not on file                    History:     Past Medical History:   Diagnosis Date    Arthritis     Diabetes mellitus, type 2     Hypertension     Thyroid disease        Past Surgical History:   Procedure Laterality Date     SECTION      CHOLECYSTECTOMY      KNEE ARTHROSCOPY W/ MENISCECTOMY Left 2021    Procedure: ARTHROSCOPY, KNEE, WITH MENISCECTOMY;  Surgeon: Manuel Cruz MD;  Location: [facility];  Service: Orthopedics;  Laterality: Left;    REPAIR OF MENISCUS OF KNEE Left 2021    Procedure: REPAIR, MENISCUS, KNEE;  Surgeon: Manuel Cruz MD;  Location: [facility];  Service: Orthopedics;  Laterality: Left;    SPINE SURGERY      TUBAL LIGATION         Time Tracking:     PT Received On: 09/15/22  PT Start Time: 1430     PT Stop Time: 1440  PT Total Time (min): 10 min     Billable Minutes: Evaluation 10 mintues  Nelda Silva, PT, DPT         09/15/2022

## 2022-09-15 NOTE — NURSING
Spoke with pt about home meds. Nurse will ask Dr. Fischer tomorrow if she wants to continue bystolic, benicar, and rybelsus. Pt states her  will bring home meds in the am in case they are needed. Pt reports these meds were stopped before surgery due to them making her BP and BG low.   Dementia

## 2022-09-16 PROBLEM — M51.37 DEGENERATION OF LUMBOSACRAL INTERVERTEBRAL DISC: Status: ACTIVE | Noted: 2022-09-16

## 2022-09-16 PROBLEM — E83.52 HYPERCALCEMIA: Status: ACTIVE | Noted: 2022-02-14

## 2022-09-16 PROBLEM — E66.3 OVERWEIGHT WITH BODY MASS INDEX (BMI) 25.0-29.9: Status: ACTIVE | Noted: 2022-02-14

## 2022-09-16 PROBLEM — Z96.652 S/P TOTAL KNEE ARTHROPLASTY, LEFT: Status: ACTIVE | Noted: 2022-09-16

## 2022-09-16 PROBLEM — M54.17 LUMBOSACRAL RADICULOPATHY: Status: ACTIVE | Noted: 2022-09-16

## 2022-09-16 PROBLEM — M48.061 SPINAL STENOSIS OF LUMBAR REGION: Status: ACTIVE | Noted: 2022-09-16

## 2022-09-16 PROBLEM — M51.379 DEGENERATION OF LUMBOSACRAL INTERVERTEBRAL DISC: Status: ACTIVE | Noted: 2022-09-16

## 2022-09-16 LAB
GLUCOSE SERPL-MCNC: 101 MG/DL (ref 70–105)
GLUCOSE SERPL-MCNC: 132 MG/DL (ref 70–105)
GLUCOSE SERPL-MCNC: 152 MG/DL (ref 70–105)
GLUCOSE SERPL-MCNC: 175 MG/DL (ref 70–105)

## 2022-09-16 PROCEDURE — 82962 GLUCOSE BLOOD TEST: CPT

## 2022-09-16 PROCEDURE — 94761 N-INVAS EAR/PLS OXIMETRY MLT: CPT

## 2022-09-16 PROCEDURE — 97110 THERAPEUTIC EXERCISES: CPT

## 2022-09-16 PROCEDURE — 25000003 PHARM REV CODE 250: Performed by: INTERNAL MEDICINE

## 2022-09-16 PROCEDURE — 63600175 PHARM REV CODE 636 W HCPCS: Performed by: INTERNAL MEDICINE

## 2022-09-16 PROCEDURE — 63600175 PHARM REV CODE 636 W HCPCS: Performed by: FAMILY MEDICINE

## 2022-09-16 PROCEDURE — 11000004 HC SNF PRIVATE

## 2022-09-16 PROCEDURE — 99900035 HC TECH TIME PER 15 MIN (STAT)

## 2022-09-16 PROCEDURE — 97116 GAIT TRAINING THERAPY: CPT | Mod: CQ

## 2022-09-16 PROCEDURE — 97110 THERAPEUTIC EXERCISES: CPT | Mod: CQ

## 2022-09-16 RX ORDER — POLYETHYLENE GLYCOL 3350 17 G/17G
17 POWDER, FOR SOLUTION ORAL DAILY PRN
Status: DISCONTINUED | OUTPATIENT
Start: 2022-09-16 | End: 2022-09-30 | Stop reason: HOSPADM

## 2022-09-16 RX ORDER — KETOROLAC TROMETHAMINE 30 MG/ML
60 INJECTION, SOLUTION INTRAMUSCULAR; INTRAVENOUS ONCE
Status: COMPLETED | OUTPATIENT
Start: 2022-09-16 | End: 2022-09-16

## 2022-09-16 RX ORDER — DOCUSATE SODIUM 100 MG/1
100 CAPSULE, LIQUID FILLED ORAL 2 TIMES DAILY PRN
Status: DISCONTINUED | OUTPATIENT
Start: 2022-09-16 | End: 2022-09-30 | Stop reason: HOSPADM

## 2022-09-16 RX ADMIN — OXYCODONE HYDROCHLORIDE 10 MG: 5 TABLET ORAL at 11:09

## 2022-09-16 RX ADMIN — METOPROLOL TARTRATE 25 MG: 25 TABLET, FILM COATED ORAL at 08:09

## 2022-09-16 RX ADMIN — DOCUSATE SODIUM 100 MG: 100 CAPSULE, LIQUID FILLED ORAL at 08:09

## 2022-09-16 RX ADMIN — PREGABALIN 75 MG: 75 CAPSULE ORAL at 08:09

## 2022-09-16 RX ADMIN — ASPIRIN 81 MG CHEWABLE TABLET 324 MG: 81 TABLET CHEWABLE at 08:09

## 2022-09-16 RX ADMIN — FAMOTIDINE 20 MG: 20 TABLET, FILM COATED ORAL at 08:09

## 2022-09-16 RX ADMIN — MUPIROCIN 1 G: 20 OINTMENT TOPICAL at 08:09

## 2022-09-16 RX ADMIN — KETOROLAC TROMETHAMINE 60 MG: 30 INJECTION, SOLUTION INTRAMUSCULAR at 09:09

## 2022-09-16 RX ADMIN — CELECOXIB 200 MG: 100 CAPSULE ORAL at 08:09

## 2022-09-16 RX ADMIN — INSULIN DETEMIR 62 UNITS: 100 INJECTION, SOLUTION SUBCUTANEOUS at 08:09

## 2022-09-16 RX ADMIN — LOSARTAN POTASSIUM 100 MG: 100 TABLET, FILM COATED ORAL at 08:09

## 2022-09-16 RX ADMIN — OXYCODONE HYDROCHLORIDE 10 MG: 5 TABLET ORAL at 06:09

## 2022-09-16 RX ADMIN — POLYETHYLENE GLYCOL 3350 17 G: 17 POWDER, FOR SOLUTION ORAL at 08:09

## 2022-09-16 RX ADMIN — AMLODIPINE BESYLATE 5 MG: 2.5 TABLET ORAL at 08:09

## 2022-09-16 RX ADMIN — OXYCODONE HYDROCHLORIDE 10 MG: 5 TABLET ORAL at 09:09

## 2022-09-16 RX ADMIN — OXYCODONE HYDROCHLORIDE 10 MG: 5 TABLET ORAL at 05:09

## 2022-09-16 RX ADMIN — SENNOSIDES AND DOCUSATE SODIUM 1 TABLET: 50; 8.6 TABLET ORAL at 08:09

## 2022-09-16 NOTE — PT/OT/SLP PROGRESS
Physical Therapy  Treatment    Jonna Cunha   MRN: [id number]   Admitting Diagnosis: S/P total knee arthroplasty, left    PT Received On: 09/16/22  PT Start Time: 13:05   PT Stop Time: 1338    PT Total Time (min): 33 min       Billable Minutes:33  Gait training 13, TherEx 20    Treatment Type: Treatment  PT/PTA: PTA     PTA visit number: 2     General Precautions: Standard, fall  Orthopedic Precautions: LLE weight bearing as tolerated   Braces: Knee immobilizer  Respiratory Status: Room air    Spiritual, Cultural Beliefs, Yarsani Practices, Values that Affect Care: no    Subjective:  Pt reports that her knee feels better and more loose since morning tx.    Pain/Comfort  Pain Rating 1: 8/10  Location - Side 1: Left  Location 1: knee  Pain Addressed 1: Pre-medicate for activity  Pain Rating Post-Intervention 1: 7/10    Objective:   Patient found with: cryotherapy, peripheral IV    Functional Mobility:  Bed Mobility:   Sit<>Supine with minimal assist      Transfers: Not Completed    Gait:   Pt ambulated approximately 125 feet with rolling walker and CONTACT GUARD ASSIST.       Stairs: Not Completed    Bed Mobility   Transfers:  Gait:     Balance:   Static Sit: NORMAL: No deviations seen in posture held statically  Dynamic Sit: NORMAL: No deviations seen in posture held dynamically  Static Stand: FAIR: Maintains without assist but unable to take challenges  Dynamic stand: FAIR: Needs CONTACT GUARD during gait       Therapeutic Activities and Exercises:     TherEx Reps   Quad Sets 30x   Glute Sets 30x   Ankle pumps 30x   SAQ 2x15 with AAROM   Heel Slides 30x    Seated ABD/ADD 30x yellow tband   SLR 20x   TherAct    Heel Raises    Mini Squats    Sit<>Stands 2x                   AM-PAC 6 CLICK MOBILITY  How much help from another person does this patient currently need?   1 = Unable, Total/Dependent Assistance  2 = A lot, Maximum/Moderate Assistance  3 = A little, Minimum/Contact Guard/Supervision  4 = None, Modified  [place]/Independent    Turning over in bed (including adjusting bedclothes, sheets and blankets)?: 3  Sitting down on and standing up from a chair with arms (e.g., wheelchair, bedside commode, etc.): 3  Moving from lying on back to sitting on the side of the bed?: 3  Moving to and from a bed to a chair (including a wheelchair)?: 3  Need to walk in hospital room?: 3  Climbing 3-5 steps with a railing?: 1  Basic Mobility Total Score: 16    AM-PAC Raw Score CMS G-Code Modifier Level of Impairment Assistance   6 % Total / Unable   7 - 9 CM 80 - 100% Maximal Assist   10 - 14 CL 60 - 80% Moderate Assist   15 - 19 CK 40 - 60% Moderate Assist   20 - 22 CJ 20 - 40% Minimal Assist   23 CI 1-20% SBA / CGA   24 CH 0% Independent/ Mod I     Patient left up in chair with call button in reach.    Assessment:  Jonna Cunha is a 67 y.o. female with a medical diagnosis of S/P total knee arthroplasty, left and presents with pain and impaired mobility.  Pt completed tx session with minor signs of fatigue. Pt prompted with VC during ambulation to maintain proper posture. Pt tolerated tx with less c/o pain due to knee feeling loosened from morning tx.  Upon completion of tx session, pt returned to her room and left in bed with cryocuff donned onto left knee and guest present.     Rehab identified problem list/impairments:     Rehab potential is good.    Activity tolerance: Good    Discharge recommendations: Discharge Facility/Level of Care Needs: outpatient OT     Barriers to discharge:      Equipment recommendations: Equipment Needed After Discharge: none     GOALS:   Multidisciplinary Problems       Physical Therapy Goals          Problem: Physical Therapy    Goal Priority Disciplines Outcome Goal Variances Interventions   Physical Therapy Goal     PT, PT/OT      Description: Short Term Goals  1. Patient will complete 30 reps of B LE exercises with correct form.   2. Patient will complete sit<>stand transfers with  SBA.  3. Patient will ambulate 100 feet with RW on level surfaces with CGA.     Long Term Goals   1. Patient will ambulate 300 feet with RW on level and unlevel surfaces with SBA.  2. Patient will complete all functional transfers with MOD I.  3. L knee AROM 0-110.   4. Patient will negotiate up and down 5 stairs with use of handrail with SBA.                          PLAN:    Patient to be seen BID  to address the above listed problems via gait training, therapeutic activities, therapeutic exercises  Plan of Care expires: 10/06/22  Plan of Care reviewed with: patient    DANNIE Cruz    09/16/2022

## 2022-09-16 NOTE — HOSPITAL COURSE
9/16/22 - pt. Staying in pain. Was not on narcotics before surgery.    9/19/22 - feels tired today. Had a hypoglycemic episode this weekend. Will continue present treatment.    9/23/22 - doing well. Voices no complaints.    9/26/22 - doing well. No complaints voiced.

## 2022-09-16 NOTE — PT/OT/SLP PROGRESS
Physical Therapy  Treatment    Jonna Cunha   MRN: [id number]   Admitting Diagnosis: S/P total knee arthroplasty, left    PT Received On: 09/16/22  PT Start Time: 11:10  PT Stop Time: 11:42                Billable Minutes:32  Gait Training 10, Therapeutic Activity 8, and Therapeutic Exercise 14             PTA visit number: 1      General Precautions: Standard, fall  Orthopedic Precautions: LLE weight bearing as tolerated   Braces:    Respiratory Status: Room air    Spiritual, Cultural Beliefs, Episcopal Practices, Values that Affect Care: no    Subjective:  Pt states that she was in pain this morning but it has decreased since taking her medicine.         Objective:        Functional Mobility:  Bed Mobility:   Supine<>Sit with supervision      Transfers:  Sit<>Stand x 7    Gait:        Stairs: Not Completed    Bed Mobility   Transfers:  Gait:     Balance:   Static Sit: NORMAL: No deviations seen in posture held statically  Dynamic Sit: NORMAL: No deviations seen in posture held dynamically  Static Stand: FAIR: Maintains without assist but unable to take challenges  Dynamic stand: FAIR: Needs CONTACT GUARD during gait       Therapeutic Activities and Exercises:     TherEx Reps   Quad Sets 30x   Glute Sets 30x   Ankle pumps 30x   SAQ 3x10 with AAROM   Heel Slides 30x (120 degrees)   Seated ABD/ADD 30x yellow tband   TherAct    Heel Raises 20x   Mini Squats 2x10   Sit<>Stands 5x                   AM-PAC 6 CLICK MOBILITY  How much help from another person does this patient currently need?   1 = Unable, Total/Dependent Assistance  2 = A lot, Maximum/Moderate Assistance  3 = A little, Minimum/Contact Guard/Supervision  4 = None, Modified [place]/Independent         AM-PAC Raw Score CMS G-Code Modifier Level of Impairment Assistance   6 % Total / Unable   7 - 9 CM 80 - 100% Maximal Assist   10 - 14 CL 60 - 80% Moderate Assist   15 - 19 CK 40 - 60% Moderate Assist   20 - 22 CJ 20 - 40% Minimal Assist   23 CI  1-20% SBA / CGA   24 CH 0% Independent/ Mod I     Patient left up in chair with call button in reach.    Assessment:  Jonna Cunha is a 67 y.o. female with a medical diagnosis of S/P total knee arthroplasty, left and presents with pain and impaired mobility.  Pt completed tx session without any signs of fatigue. Pt c/o pain during short arc quad but was motivated to complete exercises. Pt maintain proper posture and motivation throughout tx session. Upon completion of tx session, pt returned to her room and left in chair with phone and call bell handy.     Rehab identified problem list/impairments:     Rehab potential is good.    Activity tolerance: Good    Discharge recommendations:       Barriers to discharge:      Equipment recommendations:       GOALS:   Multidisciplinary Problems       Physical Therapy Goals          Problem: Physical Therapy    Goal Priority Disciplines Outcome Goal Variances Interventions   Physical Therapy Goal     PT, PT/OT      Description: Short Term Goals  1. Patient will complete 30 reps of B LE exercises with correct form.   2. Patient will complete sit<>stand transfers with SBA.  3. Patient will ambulate 100 feet with RW on level surfaces with CGA.     Long Term Goals   1. Patient will ambulate 300 feet with RW on level and unlevel surfaces with SBA.  2. Patient will complete all functional transfers with MOD I.  3. L knee AROM 0-110.   4. Patient will negotiate up and down 5 stairs with use of handrail with SBA.                          PLAN:    Patient to be seen BID  to address the above listed problems via gait training, therapeutic activities, therapeutic exercises  Plan of Care expires: 10/06/22  Plan of Care reviewed with: patient    DANNIE Cruz    09/16/2022

## 2022-09-16 NOTE — PT/OT/SLP PROGRESS
"Occupational Therapy  Treatment    Jonna Cunha   MRN: [id number]   Admitting Diagnosis: S/P total knee arthroplasty, left    OT Date of Treatment: 09/16/22   OT Start Time: 1223  OT Stop Time: 1254  OT Total Time (min): 31 min    Billable Minutes:  Therapeutic Exercise 31 min               General Precautions: Standard, fall  Orthopedic Precautions: LLE weight bearing as tolerated  Braces:    Respiratory Status: Room air         Subjective:  Communicated with RN prior to session.    Pain/Comfort  Pain Rating 1: 7/10    Objective:        Functional Mobility:  Bed Mobility:       Transfers:        Functional Ambulation:     Activities of Daily Living:         Therapeutic Activities and Exercises:  Patient completed the following for increased strength to increase I with ADLs: UBE 6 min x 1x, 3# dowel- shoulder press, chest press, bicep curls x 40, yellow theraflex x 40 both ways, yellow theraband- scapular retraction and tricep extension x 40      AM-PAC 6 CLICK ADL   How much help from another person does this patient currently need?   1 = Unable, Total/Dependent Assistance  2 = A lot, Maximum/Moderate Assistance  3 = A little, Minimum/Contact Guard/Supervision  4 = None, Modified [place]/Independent    Putting on and taking off regular lower body clothing? : 2  Bathing (including washing, rinsing, drying)?: 3  Toileting, which includes using toilet, bedpan, or urinal? : 3  Putting on and taking off regular upper body clothing?: 4  Taking care of personal grooming such as brushing teeth?: 4  Eating meals?: 4  Daily Activity Total Score: 20     AM-PAC Raw Score CMS "G-Code Modifier Level of Impairment Assistance   6 % Total / Unable   7 - 8 CM 80 - 100% Maximal Assist   9-13 CL 60 - 80% Moderate Assist   14 - 19 CK 40 - 60% Moderate Assist   20 - 22 CJ 20 - 40% Minimal Assist   23 CI 1-20% SBA / CGA   24 CH 0% Independent/ Mod I       Patient left up in chair with  PT notified    ASSESSMENT:  Jonna" Alphonse is a 67 y.o. female with a medical diagnosis of S/P total knee arthroplasty, left and presents with decreased strength, decreased endurance, decreased self-care, decreased mobility.    Rehab identified problem list/impairments: Rehab identified problem list/impairments: weakness, impaired endurance, impaired self care skills, impaired functional mobility, impaired balance, decreased lower extremity function, decreased safety awareness    Rehab potential is excellent.    Activity tolerance: Excellent    Discharge recommendations: Discharge Facility/Level of Care Needs: outpatient PT     Barriers to discharge:      Equipment recommendations: none     GOALS:   Multidisciplinary Problems       Occupational Therapy Goals          Problem: Occupational Therapy    Goal Priority Disciplines Outcome Interventions   Occupational Therapy Goal     OT, PT/OT     Description: Description: Grooming Status:   Short Term Goal: Pt will perform grooming with s/u sitting EOB.   Long Term Goal: Pt will perform grooming/oral hygiene standing at sink with Mod I      LE dressing Status:   Short Term Goal: Pt will perform LE dressing with mod a.   Long Term Goal: Pt will perform LE dressing with min a.    Toileting Status:   Short Term Goal: Pt will perform toilet hygiene on BSC with s/u.  Long Term Goal: Pt will perform toilet hygiene on toilet with no AE with Mod I.    Commode Transfer:   Short Term Goal: Pt will perform BSC t/f with s/u.  Long Term Goal:  Pt will perform toilet t/f in bathroom with Mod I.     Bathing Status:   Long Term Goal: Pt will perform sponge bath with s/u with no unsafe fatigue.     Strength Status:   Long Term Goal: Pt to perform BUE strengthening with weights and/or body weight to increase ADL independence and safety    Endurance Status:   Short Term Goal:pt to perform 15 min OT treatment with 5 or greater rest breaks  Long Term Goal: pt to perform 30 min OT treat with 3 or less rest breaks                          Plan:  Patient to be seen 5 x/week to address the above listed problems via therapeutic exercises  Plan of Care expires:    Plan of Care reviewed with: patient       Negra Rachael, OTR/CRISTINA    09/16/2022

## 2022-09-16 NOTE — PLAN OF CARE
Problem: Adult Inpatient Plan of Care  Goal: Plan of Care Review  Outcome: Ongoing, Progressing  Flowsheets (Taken 9/15/2022 2211)  Plan of Care Reviewed With: patient  Goal: Patient-Specific Goal (Individualized)  Outcome: Ongoing, Progressing  Goal: Absence of Hospital-Acquired Illness or Injury  Outcome: Ongoing, Progressing  Intervention: Identify and Manage Fall Risk  Flowsheets (Taken 9/15/2022 2211)  Safety Promotion/Fall Prevention:   assistive device/personal item within reach   nonskid shoes/socks when out of bed   side rails raised x 2  Intervention: Prevent Skin Injury  Flowsheets (Taken 9/15/2022 2211)  Body Position:   position changed independently   neutral body alignment   neutral head position  Skin Protection:   adhesive use limited   skin-to-device areas padded   skin-to-skin areas padded  Intervention: Prevent and Manage VTE (Venous Thromboembolism) Risk  Flowsheets (Taken 9/15/2022 2211)  Activity Management:   Ankle pumps - L1   Arm raise - L1  VTE Prevention/Management:   ambulation promoted   fluids promoted  Range of Motion:   active ROM (range of motion) encouraged   ROM (range of motion) performed  Intervention: Prevent Infection  Flowsheets (Taken 9/15/2022 2211)  Infection Prevention:   equipment surfaces disinfected   hand hygiene promoted   personal protective equipment utilized   rest/sleep promoted  Goal: Optimal Comfort and Wellbeing  Outcome: Ongoing, Progressing  Intervention: Monitor Pain and Promote Comfort  Flowsheets (Taken 9/15/2022 2211)  Pain Management Interventions:   pillow support provided   position adjusted   relaxation techniques promoted   quiet environment facilitated  Intervention: Provide Person-Centered Care  Flowsheets (Taken 9/15/2022 2211)  Trust Relationship/Rapport:   emotional support provided   care explained   choices provided   empathic listening provided   questions encouraged   reassurance provided   thoughts/feelings acknowledged   questions  answered  Goal: Readiness for Transition of Care  Outcome: Ongoing, Progressing

## 2022-09-16 NOTE — SUBJECTIVE & OBJECTIVE
Interval History: in pain. Orders revised.    Review of Systems  Objective:     Vital Signs (Most Recent):  Temp: 98.4 °F (36.9 °C) (09/16/22 0751)  Pulse: 73 (09/16/22 0751)  Resp: 18 (09/16/22 0751)  BP: 138/60 (09/16/22 0751)  SpO2: 99 % (09/16/22 0751) Vital Signs (24h Range):  Temp:  [98.4 °F (36.9 °C)-99 °F (37.2 °C)] 98.4 °F (36.9 °C)  Pulse:  [73-87] 73  Resp:  [16-20] 18  SpO2:  [93 %-99 %] 99 %  BP: (137-161)/(59-69) 138/60        There is no height or weight on file to calculate BMI.  No intake or output data in the 24 hours ending 09/16/22 0757   Physical Exam    Significant Labs: All pertinent labs within the past 24 hours have been reviewed.    Significant Imaging: I have reviewed all pertinent imaging results/findings within the past 24 hours.

## 2022-09-16 NOTE — PLAN OF CARE
Problem: Diabetes Comorbidity  Goal: Blood Glucose Level Within Targeted Range  Outcome: Ongoing, Progressing  Intervention: Monitor and Manage Glycemia  Flowsheets (Taken 9/16/2022 1846)  Glycemic Management: blood glucose monitored     Problem: Infection  Goal: Absence of Infection Signs and Symptoms  Outcome: Ongoing, Progressing  Intervention: Prevent or Manage Infection  Flowsheets (Taken 9/16/2022 1846)  Fever Reduction/Comfort Measures: fluid intake increased  Infection Management: aseptic technique maintained  Isolation Precautions:   precautions maintained   precautions initiated

## 2022-09-16 NOTE — PROGRESS NOTES
[facility]  Hospital Medicine  Progress Note    Patient Name: Jonna Cunha  MRN: [id number]  Patient Class: IP- Swing   Admission Date: 9/15/2022  Length of Stay: 1 days  Attending Physician: Abimbola Fischer, *  Primary Care Provider: Abimbola Fischer MD        Subjective:     Principal Problem:Acute meniscal tear of left knee        HPI:  No notes on file    Overview/Hospital Course:  9/16/22 - pt. Staying in pain. Was not on narcotics before surgery.      Interval History: in pain. Orders revised.    Review of Systems  Objective:     Vital Signs (Most Recent):  Temp: 98.4 °F (36.9 °C) (09/16/22 0751)  Pulse: 73 (09/16/22 0751)  Resp: 18 (09/16/22 0751)  BP: 138/60 (09/16/22 0751)  SpO2: 99 % (09/16/22 0751) Vital Signs (24h Range):  Temp:  [98.4 °F (36.9 °C)-99 °F (37.2 °C)] 98.4 °F (36.9 °C)  Pulse:  [73-87] 73  Resp:  [16-20] 18  SpO2:  [93 %-99 %] 99 %  BP: (137-161)/(59-69) 138/60        There is no height or weight on file to calculate BMI.  No intake or output data in the 24 hours ending 09/16/22 0757   Physical Exam    Significant Labs: All pertinent labs within the past 24 hours have been reviewed.    Significant Imaging: I have reviewed all pertinent imaging results/findings within the past 24 hours.      Assessment/Plan:      * Acute meniscal tear of left knee          VTE Risk Mitigation (From admission, onward)         Ordered     Place SELINA hose  Until discontinued         09/15/22 1642     Place sequential compression device  Until discontinued         09/15/22 1642     Place sequential compression device  Until discontinued         09/15/22 1642     IP VTE HIGH RISK PATIENT  Once         09/15/22 1613     Place SELINA hose  Until discontinued         09/15/22 1613                Discharge Planning   DOTTIE:      Code Status: Full Code   Is the patient medically ready for discharge?:     Reason for patient still in hospital (select all that apply): Patient  trending condition                     Abimbola Fischer MD  Department of Hospital Medicine   [facility]

## 2022-09-17 LAB
GLUCOSE SERPL-MCNC: 116 MG/DL (ref 70–105)
GLUCOSE SERPL-MCNC: 136 MG/DL (ref 70–105)
GLUCOSE SERPL-MCNC: 91 MG/DL (ref 70–105)
GLUCOSE SERPL-MCNC: 97 MG/DL (ref 70–105)

## 2022-09-17 PROCEDURE — 25000003 PHARM REV CODE 250: Performed by: INTERNAL MEDICINE

## 2022-09-17 PROCEDURE — 99900035 HC TECH TIME PER 15 MIN (STAT)

## 2022-09-17 PROCEDURE — 94761 N-INVAS EAR/PLS OXIMETRY MLT: CPT

## 2022-09-17 PROCEDURE — 11000004 HC SNF PRIVATE

## 2022-09-17 PROCEDURE — 82962 GLUCOSE BLOOD TEST: CPT

## 2022-09-17 RX ADMIN — CELECOXIB 200 MG: 100 CAPSULE ORAL at 08:09

## 2022-09-17 RX ADMIN — MUPIROCIN 1 G: 20 OINTMENT TOPICAL at 08:09

## 2022-09-17 RX ADMIN — LOSARTAN POTASSIUM 100 MG: 100 TABLET, FILM COATED ORAL at 09:09

## 2022-09-17 RX ADMIN — PREGABALIN 75 MG: 75 CAPSULE ORAL at 08:09

## 2022-09-17 RX ADMIN — SENNOSIDES AND DOCUSATE SODIUM 1 TABLET: 50; 8.6 TABLET ORAL at 08:09

## 2022-09-17 RX ADMIN — CELECOXIB 200 MG: 100 CAPSULE ORAL at 09:09

## 2022-09-17 RX ADMIN — FAMOTIDINE 20 MG: 20 TABLET, FILM COATED ORAL at 09:09

## 2022-09-17 RX ADMIN — OXYCODONE HYDROCHLORIDE 10 MG: 5 TABLET ORAL at 08:09

## 2022-09-17 RX ADMIN — METOPROLOL TARTRATE 25 MG: 25 TABLET, FILM COATED ORAL at 08:09

## 2022-09-17 RX ADMIN — ASPIRIN 81 MG CHEWABLE TABLET 324 MG: 81 TABLET CHEWABLE at 09:09

## 2022-09-17 RX ADMIN — METOPROLOL TARTRATE 25 MG: 25 TABLET, FILM COATED ORAL at 09:09

## 2022-09-17 RX ADMIN — AMLODIPINE BESYLATE 5 MG: 2.5 TABLET ORAL at 09:09

## 2022-09-17 RX ADMIN — FAMOTIDINE 20 MG: 20 TABLET, FILM COATED ORAL at 08:09

## 2022-09-17 RX ADMIN — PREGABALIN 75 MG: 75 CAPSULE ORAL at 09:09

## 2022-09-17 RX ADMIN — MUPIROCIN 1 G: 20 OINTMENT TOPICAL at 09:09

## 2022-09-17 RX ADMIN — OXYCODONE HYDROCHLORIDE 10 MG: 5 TABLET ORAL at 09:09

## 2022-09-17 RX ADMIN — OXYCODONE HYDROCHLORIDE 10 MG: 5 TABLET ORAL at 01:09

## 2022-09-17 NOTE — NURSING
Vital signs taken b/p 104/45 hr 68 resp 18 and sao2 96 RA temp 97.6 pt states no pain at present. Vital signs no significant change from am vital signs.

## 2022-09-18 LAB
GLUCOSE SERPL-MCNC: 161 MG/DL (ref 70–105)
GLUCOSE SERPL-MCNC: 183 MG/DL (ref 70–105)
GLUCOSE SERPL-MCNC: 189 MG/DL (ref 70–105)
GLUCOSE SERPL-MCNC: 197 MG/DL (ref 70–105)

## 2022-09-18 PROCEDURE — 82962 GLUCOSE BLOOD TEST: CPT

## 2022-09-18 PROCEDURE — 63600175 PHARM REV CODE 636 W HCPCS: Performed by: INTERNAL MEDICINE

## 2022-09-18 PROCEDURE — 99900035 HC TECH TIME PER 15 MIN (STAT)

## 2022-09-18 PROCEDURE — 25000003 PHARM REV CODE 250: Performed by: INTERNAL MEDICINE

## 2022-09-18 PROCEDURE — 11000004 HC SNF PRIVATE

## 2022-09-18 PROCEDURE — 94761 N-INVAS EAR/PLS OXIMETRY MLT: CPT

## 2022-09-18 RX ADMIN — LOSARTAN POTASSIUM 100 MG: 100 TABLET, FILM COATED ORAL at 08:09

## 2022-09-18 RX ADMIN — MUPIROCIN 1 G: 20 OINTMENT TOPICAL at 08:09

## 2022-09-18 RX ADMIN — CELECOXIB 200 MG: 100 CAPSULE ORAL at 08:09

## 2022-09-18 RX ADMIN — OXYCODONE HYDROCHLORIDE 10 MG: 5 TABLET ORAL at 11:09

## 2022-09-18 RX ADMIN — METOPROLOL TARTRATE 25 MG: 25 TABLET, FILM COATED ORAL at 08:09

## 2022-09-18 RX ADMIN — SENNOSIDES AND DOCUSATE SODIUM 1 TABLET: 50; 8.6 TABLET ORAL at 08:09

## 2022-09-18 RX ADMIN — FAMOTIDINE 20 MG: 20 TABLET, FILM COATED ORAL at 08:09

## 2022-09-18 RX ADMIN — PREGABALIN 75 MG: 75 CAPSULE ORAL at 08:09

## 2022-09-18 RX ADMIN — OXYCODONE HYDROCHLORIDE 10 MG: 5 TABLET ORAL at 05:09

## 2022-09-18 RX ADMIN — ASPIRIN 81 MG CHEWABLE TABLET 324 MG: 81 TABLET CHEWABLE at 08:09

## 2022-09-18 RX ADMIN — OXYCODONE HYDROCHLORIDE 10 MG: 5 TABLET ORAL at 10:09

## 2022-09-18 RX ADMIN — AMLODIPINE BESYLATE 5 MG: 2.5 TABLET ORAL at 08:09

## 2022-09-18 RX ADMIN — ZOLPIDEM TARTRATE 5 MG: 5 TABLET, COATED ORAL at 08:09

## 2022-09-18 RX ADMIN — INSULIN DETEMIR 62 UNITS: 100 INJECTION, SOLUTION SUBCUTANEOUS at 08:09

## 2022-09-18 NOTE — PLAN OF CARE
Problem: Diabetes Comorbidity  Goal: Blood Glucose Level Within Targeted Range  Outcome: Ongoing, Progressing  Intervention: Monitor and Manage Glycemia  Flowsheets (Taken 9/18/2022 2131)  Glycemic Management: blood glucose monitored

## 2022-09-18 NOTE — NURSING
"Pt breaking out in rash around dressing. Pt c/o of itchiness. Pt states "I have had this type of reaction to adhesive before." Dr. Mao notified. Area cleansed with soap and water, telfa placed over incision site, wrapped with ace bandage. Will continue to monitor site.  "

## 2022-09-19 LAB
GLUCOSE SERPL-MCNC: 188 MG/DL (ref 70–105)
GLUCOSE SERPL-MCNC: 201 MG/DL (ref 70–105)

## 2022-09-19 PROCEDURE — 11000004 HC SNF PRIVATE

## 2022-09-19 PROCEDURE — 97110 THERAPEUTIC EXERCISES: CPT

## 2022-09-19 PROCEDURE — 94761 N-INVAS EAR/PLS OXIMETRY MLT: CPT

## 2022-09-19 PROCEDURE — 99900035 HC TECH TIME PER 15 MIN (STAT)

## 2022-09-19 PROCEDURE — 97530 THERAPEUTIC ACTIVITIES: CPT | Mod: CQ

## 2022-09-19 PROCEDURE — 99308 PR NURSING FAC CARE, SUBSEQ, MINOR COMPLIC: ICD-10-PCS | Mod: ,,, | Performed by: FAMILY MEDICINE

## 2022-09-19 PROCEDURE — 63600175 PHARM REV CODE 636 W HCPCS: Performed by: INTERNAL MEDICINE

## 2022-09-19 PROCEDURE — 82962 GLUCOSE BLOOD TEST: CPT

## 2022-09-19 PROCEDURE — 99308 SBSQ NF CARE LOW MDM 20: CPT | Mod: ,,, | Performed by: FAMILY MEDICINE

## 2022-09-19 PROCEDURE — 25000003 PHARM REV CODE 250: Performed by: FAMILY MEDICINE

## 2022-09-19 PROCEDURE — 25000003 PHARM REV CODE 250: Performed by: INTERNAL MEDICINE

## 2022-09-19 PROCEDURE — 97110 THERAPEUTIC EXERCISES: CPT | Mod: CQ

## 2022-09-19 PROCEDURE — 97116 GAIT TRAINING THERAPY: CPT | Mod: CQ

## 2022-09-19 RX ORDER — OXYCODONE AND ACETAMINOPHEN 10; 325 MG/1; MG/1
1 TABLET ORAL EVERY 4 HOURS PRN
Status: DISCONTINUED | OUTPATIENT
Start: 2022-09-19 | End: 2022-09-30 | Stop reason: HOSPADM

## 2022-09-19 RX ADMIN — LOSARTAN POTASSIUM 100 MG: 100 TABLET, FILM COATED ORAL at 08:09

## 2022-09-19 RX ADMIN — FAMOTIDINE 20 MG: 20 TABLET, FILM COATED ORAL at 08:09

## 2022-09-19 RX ADMIN — CELECOXIB 200 MG: 100 CAPSULE ORAL at 08:09

## 2022-09-19 RX ADMIN — SENNOSIDES AND DOCUSATE SODIUM 1 TABLET: 50; 8.6 TABLET ORAL at 08:09

## 2022-09-19 RX ADMIN — OXYCODONE HYDROCHLORIDE AND ACETAMINOPHEN 1 TABLET: 10; 325 TABLET ORAL at 01:09

## 2022-09-19 RX ADMIN — ZOLPIDEM TARTRATE 5 MG: 5 TABLET, COATED ORAL at 08:09

## 2022-09-19 RX ADMIN — PREGABALIN 75 MG: 75 CAPSULE ORAL at 08:09

## 2022-09-19 RX ADMIN — AMLODIPINE BESYLATE 5 MG: 2.5 TABLET ORAL at 08:09

## 2022-09-19 RX ADMIN — OXYCODONE HYDROCHLORIDE AND ACETAMINOPHEN 1 TABLET: 10; 325 TABLET ORAL at 06:09

## 2022-09-19 RX ADMIN — ASPIRIN 81 MG CHEWABLE TABLET 324 MG: 81 TABLET CHEWABLE at 08:09

## 2022-09-19 RX ADMIN — INSULIN DETEMIR 62 UNITS: 100 INJECTION, SOLUTION SUBCUTANEOUS at 08:09

## 2022-09-19 RX ADMIN — OXYCODONE HYDROCHLORIDE 10 MG: 5 TABLET ORAL at 08:09

## 2022-09-19 RX ADMIN — METOPROLOL TARTRATE 25 MG: 25 TABLET, FILM COATED ORAL at 08:09

## 2022-09-19 NOTE — PLAN OF CARE
Problem: Adult Inpatient Plan of Care  Goal: Plan of Care Review  Outcome: Ongoing, Progressing  Goal: Absence of Hospital-Acquired Illness or Injury  Outcome: Ongoing, Progressing  Goal: Optimal Comfort and Wellbeing  Outcome: Ongoing, Progressing  Goal: Readiness for Transition of Care  Outcome: Ongoing, Progressing     Problem: Diabetes Comorbidity  Goal: Blood Glucose Level Within Targeted Range  Outcome: Ongoing, Progressing     Problem: Infection  Goal: Absence of Infection Signs and Symptoms  Outcome: Ongoing, Progressing

## 2022-09-19 NOTE — PLAN OF CARE
Problem: Occupational Therapy  Goal: Occupational Therapy Goal  Description: Description: Grooming Status:   Short Term Goal: Pt will perform grooming with s/u sitting EOB.   Long Term Goal: Pt will perform grooming/oral hygiene standing at sink with Mod I      LE dressing Status:   Short Term Goal: Pt will perform LE dressing with mod a.   Long Term Goal: Pt will perform LE dressing with min a.    Toileting Status:   Short Term Goal: Pt will perform toilet hygiene on BSC with s/u.  Long Term Goal: Pt will perform toilet hygiene on toilet with no AE with Mod I.    Commode Transfer:   Short Term Goal: Pt will perform BSC t/f with s/u.  Long Term Goal:  Pt will perform toilet t/f in bathroom with Mod I.     Bathing Status:   Long Term Goal: Pt will perform sponge bath with s/u with no unsafe fatigue.     Strength Status:   Long Term Goal: Pt to perform BUE strengthening with weights and/or body weight to increase ADL independence and safety    Endurance Status:   Short Term Goal:pt to perform 15 min OT treatment with 5 or greater rest breaks  Long Term Goal: pt to perform 30 min OT treat with 3 or less rest breaks    Outcome: Ongoing, Progressing

## 2022-09-19 NOTE — PLAN OF CARE
Problem: Physical Therapy  Goal: Physical Therapy Goal  Description: Short Term Goals  1. Patient will complete 30 reps of B LE exercises with correct form.   2. Patient will complete sit<>stand transfers with SBA.  3. Patient will ambulate 100 feet with RW on level surfaces with CGA.     Long Term Goals   1. Patient will ambulate 300 feet with RW on level and unlevel surfaces with SBA.  2. Patient will complete all functional transfers with MOD I.  3. L knee AROM 0-110.   4. Patient will negotiate up and down 5 stairs with use of handrail with SBA.     Outcome: Ongoing, Progressing   Continue POC Per PT order to progress patient toward rehab goals as tolerated by patient.  DANNIE Angel 9/19/2022

## 2022-09-19 NOTE — SUBJECTIVE & OBJECTIVE
Interval History: no new complaints this AM.    Review of Systems  Objective:     Vital Signs (Most Recent):  Temp: 98.5 °F (36.9 °C) (09/18/22 2018)  Pulse: 62 (09/19/22 0649)  Resp: 20 (09/19/22 0649)  BP: (!) 144/67 (09/18/22 2018)  SpO2: 96 % (09/19/22 0649)   Vital Signs (24h Range):  Temp:  [97.7 °F (36.5 °C)-98.5 °F (36.9 °C)] 98.5 °F (36.9 °C)  Pulse:  [62-78] 62  Resp:  [16-72] 20  SpO2:  [96 %-98 %] 96 %  BP: (111-144)/(49-67) 144/67     Weight: 86.9 kg (191 lb 9.3 oz)  Body mass index is 31.88 kg/m².  No intake or output data in the 24 hours ending 09/19/22 0801   Physical Exam    Significant Labs: All pertinent labs within the past 24 hours have been reviewed.    Significant Imaging: I have reviewed all pertinent imaging results/findings within the past 24 hours.

## 2022-09-19 NOTE — NURSING
"Nursing entered in to patients room to check blood sugar and start getting patient up to bathe and dress for today. Patient states "I am not getting a bath right now".   "

## 2022-09-19 NOTE — PROGRESS NOTES
[facility] - [facility]  Hospital Medicine  Progress Note    Patient Name: Jonna Cunha  MRN: [id number]  Patient Class: IP- Swing   Admission Date: 9/15/2022  Length of Stay: 4 days  Attending Physician: Abimbola Fischer, *  Primary Care Provider: Abimbola Fischer MD        Subjective:     Principal Problem:S/P total knee arthroplasty, left        HPI:  No notes on file    Overview/Hospital Course:  9/16/22 - pt. Staying in pain. Was not on narcotics before surgery.    9/19/22 - feels tired today. Had a hypoglycemic episode this weekend. Will continue present treatment.      Interval History: no new complaints this AM.    Review of Systems  Objective:     Vital Signs (Most Recent):  Temp: 98.5 °F (36.9 °C) (09/18/22 2018)  Pulse: 62 (09/19/22 0649)  Resp: 20 (09/19/22 0649)  BP: (!) 144/67 (09/18/22 2018)  SpO2: 96 % (09/19/22 0649)   Vital Signs (24h Range):  Temp:  [97.7 °F (36.5 °C)-98.5 °F (36.9 °C)] 98.5 °F (36.9 °C)  Pulse:  [62-78] 62  Resp:  [16-72] 20  SpO2:  [96 %-98 %] 96 %  BP: (111-144)/(49-67) 144/67     Weight: 86.9 kg (191 lb 9.3 oz)  Body mass index is 31.88 kg/m².  No intake or output data in the 24 hours ending 09/19/22 0801   Physical Exam    Significant Labs: All pertinent labs within the past 24 hours have been reviewed.    Significant Imaging: I have reviewed all pertinent imaging results/findings within the past 24 hours.      Assessment/Plan:      Acute meniscal tear of left knee          VTE Risk Mitigation (From admission, onward)         Ordered     IP VTE HIGH RISK PATIENT  Once         09/15/22 1613     Place SELINA hose  Until discontinued         09/15/22 1613                Discharge Planning   DOTTIE:      Code Status: Full Code   Is the patient medically ready for discharge?:     Reason for patient still in hospital (select all that apply): Patient trending condition  Discharge Plan A: Home with family                  Abimbola Fischer  MD  Department of Hospital Medicine   [facility] - Medical Surgical Unit

## 2022-09-19 NOTE — PT/OT/SLP PROGRESS
"Occupational Therapy  Treatment    Jonna Cunha   MRN: [id number]   Admitting Diagnosis: S/P total knee arthroplasty, left    OT Date of Treatment: 09/19/22   OT Start Time: 1213  OT Stop Time: 1239  OT Total Time (min): 26 min    Billable Minutes:  Therapeutic Exercise 26 min               General Precautions: Standard, fall  Orthopedic Precautions: LLE weight bearing as tolerated  Braces:    Respiratory Status: Room air         Subjective:  Communicated with RN prior to session.    Pain/Comfort  Pain Rating 1: 8/10    Objective:        Functional Mobility:  Bed Mobility:       Transfers:        Functional Ambulation:     Activities of Daily Living:         Therapeutic Activities and Exercises:  Patient completed the following for increased strength to increase I with ADLs: 3# dowel- shoulder press, chest press, bicep curls x 40, yellow theraflex x 40 both ways, yellow theraband- scapular retraction and tricep extension x 40      AM-PAC 6 CLICK ADL   How much help from another person does this patient currently need?   1 = Unable, Total/Dependent Assistance  2 = A lot, Maximum/Moderate Assistance  3 = A little, Minimum/Contact Guard/Supervision  4 = None, Modified [place]/Independent    Putting on and taking off regular lower body clothing? : 3  Bathing (including washing, rinsing, drying)?: 3  Toileting, which includes using toilet, bedpan, or urinal? : 3  Putting on and taking off regular upper body clothing?: 4  Taking care of personal grooming such as brushing teeth?: 4  Eating meals?: 4  Daily Activity Total Score: 21     AM-PAC Raw Score CMS "G-Code Modifier Level of Impairment Assistance   6 % Total / Unable   7 - 8 CM 80 - 100% Maximal Assist   9-13 CL 60 - 80% Moderate Assist   14 - 19 CK 40 - 60% Moderate Assist   20 - 22 CJ 20 - 40% Minimal Assist   23 CI 1-20% SBA / CGA   24 CH 0% Independent/ Mod I       Patient left up in chair with  PT notified    ASSESSMENT:  Jonna Cunha is a 67 y.o. " female with a medical diagnosis of S/P total knee arthroplasty, left and presents with decreased strength, decreased endurance, decreased self-care, decreased mobility.    Rehab identified problem list/impairments: Rehab identified problem list/impairments: weakness, impaired endurance, impaired self care skills, impaired functional mobility, impaired balance, decreased lower extremity function, decreased safety awareness    Rehab potential is excellent.    Activity tolerance: Excellent    Discharge recommendations: Discharge Facility/Level of Care Needs: outpatient OT     Barriers to discharge:      Equipment recommendations: none     GOALS:   Multidisciplinary Problems       Occupational Therapy Goals          Problem: Occupational Therapy    Goal Priority Disciplines Outcome Interventions   Occupational Therapy Goal     OT, PT/OT Ongoing, Progressing    Description: Description: Grooming Status:   Short Term Goal: Pt will perform grooming with s/u sitting EOB.   Long Term Goal: Pt will perform grooming/oral hygiene standing at sink with Mod I      LE dressing Status:   Short Term Goal: Pt will perform LE dressing with mod a.   Long Term Goal: Pt will perform LE dressing with min a.    Toileting Status:   Short Term Goal: Pt will perform toilet hygiene on BSC with s/u.  Long Term Goal: Pt will perform toilet hygiene on toilet with no AE with Mod I.    Commode Transfer:   Short Term Goal: Pt will perform BSC t/f with s/u.  Long Term Goal:  Pt will perform toilet t/f in bathroom with Mod I.     Bathing Status:   Long Term Goal: Pt will perform sponge bath with s/u with no unsafe fatigue.     Strength Status:   Long Term Goal: Pt to perform BUE strengthening with weights and/or body weight to increase ADL independence and safety    Endurance Status:   Short Term Goal:pt to perform 15 min OT treatment with 5 or greater rest breaks  Long Term Goal: pt to perform 30 min OT treat with 3 or less rest breaks                          Plan:  Patient to be seen 5 x/week to address the above listed problems via therapeutic exercises  Plan of Care expires:    Plan of Care reviewed with: patient       Negra Rachael, OTR/CRISTINA    09/19/2022

## 2022-09-19 NOTE — PT/OT/SLP PROGRESS
Physical Therapy  Treatment    Jonna Cunha   MRN: [id number]   Admitting Diagnosis: S/P total knee arthroplasty, left    PT Received On: 09/19/22  PT Start Time: 08:19   PT Stop Time: 1330    PT Total Time (min): 45 min       Billable Minutes: 52  Gait training 10, Ther Ex 30     Treatment Type: Treatment  PT/PTA: PTA     PTA visit number: 3     General Precautions: Standard, fall  Orthopedic Precautions: LLE weight bearing as tolerated   Braces: Knee immobilizer  Respiratory Status: Room air    Spiritual, Cultural Beliefs, Scientology Practices, Values that Affect Care: no    Subjective:  Pt. Is received from occupational therapist in rehab gym.  Patient is agreeable to afternoon physical therapy treatment session.     Pain/Comfort  Pain Rating 1: 7/10  Location - Side 1: Left  Location - Orientation 1: generalized  Location 1: knee    Objective:        Functional Mobility:  Bed Mobility:   Supine<>Sit with minimal assist      Transfers: Not Completed    Gait: Approx. 150' with RW CGA on level indoor surface; step to progressing to step through pattern;  Decreased heel strike upon initial contact Left LE, decrease knee flexion Left LE progressing during swing phase      Stairs: Not Completed      Balance:   Static Sit: NORMAL: No deviations seen in posture held statically  Dynamic Sit: NORMAL: No deviations seen in posture held dynamically  Static Stand: FAIR: Maintains without assist but unable to take challenges  Dynamic stand: FAIR: Needs CONTACT GUARD during gait       Therapeutic Activities and Exercises:     TherEx Reps   Quad Sets 30x   Glute Sets 30x   Ankle pumps 30x   SAQ 3x10   Heel Slides 30x (87 degrees)   Seated ADD 30x    SLR Not completed    Horizontal abduction     Seated Marches    TherAct    Heel Raises    Mini Squats    Sit<>Stands                    AM-PAC 6 CLICK MOBILITY  How much help from another person does this patient currently need?   1 = Unable, Total/Dependent Assistance  2 = A lot,  Maximum/Moderate Assistance  3 = A little, Minimum/Contact Guard/Supervision  4 = None, Modified [place]/Independent    Turning over in bed (including adjusting bedclothes, sheets and blankets)?: 3  Sitting down on and standing up from a chair with arms (e.g., wheelchair, bedside commode, etc.): 3  Moving from lying on back to sitting on the side of the bed?: 3  Moving to and from a bed to a chair (including a wheelchair)?: 3  Need to walk in hospital room?: 3  Climbing 3-5 steps with a railing?: 1  Basic Mobility Total Score: 16    AM-PAC Raw Score CMS G-Code Modifier Level of Impairment Assistance   6 % Total / Unable   7 - 9 CM 80 - 100% Maximal Assist   10 - 14 CL 60 - 80% Moderate Assist   15 - 19 CK 40 - 60% Moderate Assist   20 - 22 CJ 20 - 40% Minimal Assist   23 CI 1-20% SBA / CGA   24 CH 0% Independent/ Mod I         Cryocuff to Left knee with Left knee elevated greater than heart level at end of treatment session.     Assessment:  Jonna Cunha is a 67 y.o. female with a medical diagnosis of S/P total knee arthroplasty, left and presents with pain and impaired mobility.  Patient requires mod verbal and visual cues to complete Ther Ex with proper alignment, speed of movement, count, and hold times.  Patient requires increased time and effort to progress through exercises.  Patient requires multiple rest breaks throughout treatment session due to reports of fatigue and weakness.  No adverse effects noted.        Rehab identified problem list/impairments:     Rehab potential is good.    Activity tolerance: Good    Discharge recommendations: Discharge Facility/Level of Care Needs: outpatient OT     Barriers to discharge:      Equipment recommendations: Equipment Needed After Discharge: none     GOALS:   Multidisciplinary Problems       Physical Therapy Goals          Problem: Physical Therapy    Goal Priority Disciplines Outcome Goal Variances Interventions   Physical Therapy Goal     PT, PT/OT  Ongoing, Progressing     Description: Short Term Goals  1. Patient will complete 30 reps of B LE exercises with correct form.   2. Patient will complete sit<>stand transfers with SBA.  3. Patient will ambulate 100 feet with RW on level surfaces with CGA.     Long Term Goals   1. Patient will ambulate 300 feet with RW on level and unlevel surfaces with SBA.  2. Patient will complete all functional transfers with MOD I.  3. L knee AROM 0-110.   4. Patient will negotiate up and down 5 stairs with use of handrail with SBA.                          PLAN:    Patient to be seen BID  to address the above listed problems via gait training, therapeutic activities, therapeutic exercises  Plan of Care expires: 10/06/22  Plan of Care reviewed with: patient    Continue POC Per PT order to progress patient toward rehab goals as tolerated by patient.  DNANIE Angel  9/19/2022

## 2022-09-19 NOTE — PT/OT/SLP PROGRESS
Physical Therapy  Treatment    Jonna Cunha   MRN: [id number]   Admitting Diagnosis: S/P total knee arthroplasty, left    PT Received On: 09/19/22  PT Start Time: 08:19   PT Stop Time: 0911    PT Total Time (min): 52 min       Billable Minutes: 52  Gait training 19, TherEx 20, TherAct 14    Treatment Type: Treatment  PT/PTA: PTA     PTA visit number: 3     General Precautions: Standard, fall  Orthopedic Precautions: LLE weight bearing as tolerated   Braces: Knee immobilizer  Respiratory Status: Room air    Spiritual, Cultural Beliefs, Hindu Practices, Values that Affect Care: no    Subjective:  Pt reports that her knee feels a little stiff this morning and she could move it better Friday.    Pain/Comfort  Pain Rating 1: 8/10  Location - Side 1: Left  Location 1: knee  Pain Addressed 1: Pre-medicate for activity    Objective:   Patient found with: cryotherapy, peripheral IV    Functional Mobility:  Bed Mobility:   Supine<>Sit with minimal assist      Transfers: Not Completed    Gait:   Pt ambulated approximately 250nfeet with rolling walker and CONTACT GUARD ASSIST.       Stairs: Not Completed    Bed Mobility   Transfers:  Gait:     Balance:   Static Sit: NORMAL: No deviations seen in posture held statically  Dynamic Sit: NORMAL: No deviations seen in posture held dynamically  Static Stand: FAIR: Maintains without assist but unable to take challenges  Dynamic stand: FAIR: Needs CONTACT GUARD during gait       Therapeutic Activities and Exercises:     TherEx Reps   Quad Sets 30x   Glute Sets 30x   Ankle pumps 30x   SAQ 3x10   Heel Slides 30x (113 degrees)   Seated ADD 30x    SLR Not completed    Horizontal abduction  2x15   Seated Marches 3x10   TherAct    Heel Raises 2x10   Mini Squats 3x10   Sit<>Stands Not completed                   AM-PAC 6 CLICK MOBILITY  How much help from another person does this patient currently need?   1 = Unable, Total/Dependent Assistance  2 = A lot, Maximum/Moderate Assistance  3 = A  little, Minimum/Contact Guard/Supervision  4 = None, Modified [place]/Independent    Turning over in bed (including adjusting bedclothes, sheets and blankets)?: 3  Sitting down on and standing up from a chair with arms (e.g., wheelchair, bedside commode, etc.): 3  Moving from lying on back to sitting on the side of the bed?: 3  Moving to and from a bed to a chair (including a wheelchair)?: 3  Need to walk in hospital room?: 3  Climbing 3-5 steps with a railing?: 1  Basic Mobility Total Score: 16    AM-PAC Raw Score CMS G-Code Modifier Level of Impairment Assistance   6 % Total / Unable   7 - 9 CM 80 - 100% Maximal Assist   10 - 14 CL 60 - 80% Moderate Assist   15 - 19 CK 40 - 60% Moderate Assist   20 - 22 CJ 20 - 40% Minimal Assist   23 CI 1-20% SBA / CGA   24 CH 0% Independent/ Mod I         Assessment:  Jonna Cunha is a 67 y.o. female with a medical diagnosis of S/P total knee arthroplasty, left and presents with pain and impaired mobility.  Pt educated on possible morning stiffness left knee due to prolonged positioning overnight.  Pt prompted with VC to push up from chair when standing to increase safety awareness. Pt required verbal, visual, and tactile cues for proper posture throughout tx. Pt required frequent rest breaks throughout tx due to pain and stiffness of left knee. Upon completion of tx session, pt returned to her room and left in bed with cryocuff donned onto left knee.     Rehab identified problem list/impairments:     Rehab potential is good.    Activity tolerance: Good    Discharge recommendations: Discharge Facility/Level of Care Needs: outpatient OT     Barriers to discharge:      Equipment recommendations: Equipment Needed After Discharge: none     GOALS:   Multidisciplinary Problems       Physical Therapy Goals          Problem: Physical Therapy    Goal Priority Disciplines Outcome Goal Variances Interventions   Physical Therapy Goal     PT, PT/OT      Description: Short Term  Goals  1. Patient will complete 30 reps of B LE exercises with correct form.   2. Patient will complete sit<>stand transfers with SBA.  3. Patient will ambulate 100 feet with RW on level surfaces with CGA.     Long Term Goals   1. Patient will ambulate 300 feet with RW on level and unlevel surfaces with SBA.  2. Patient will complete all functional transfers with MOD I.  3. L knee AROM 0-110.   4. Patient will negotiate up and down 5 stairs with use of handrail with SBA.                          PLAN:    Patient to be seen BID  to address the above listed problems via gait training, therapeutic activities, therapeutic exercises  Plan of Care expires: 10/06/22  Plan of Care reviewed with: patient    DANNIE Cruz    09/19/2022

## 2022-09-20 LAB
GLUCOSE SERPL-MCNC: 121 MG/DL (ref 70–105)
GLUCOSE SERPL-MCNC: 174 MG/DL (ref 70–105)

## 2022-09-20 PROCEDURE — 99900035 HC TECH TIME PER 15 MIN (STAT)

## 2022-09-20 PROCEDURE — 25000003 PHARM REV CODE 250: Performed by: FAMILY MEDICINE

## 2022-09-20 PROCEDURE — 63600175 PHARM REV CODE 636 W HCPCS: Performed by: INTERNAL MEDICINE

## 2022-09-20 PROCEDURE — 82962 GLUCOSE BLOOD TEST: CPT

## 2022-09-20 PROCEDURE — 97110 THERAPEUTIC EXERCISES: CPT

## 2022-09-20 PROCEDURE — 94761 N-INVAS EAR/PLS OXIMETRY MLT: CPT

## 2022-09-20 PROCEDURE — 97110 THERAPEUTIC EXERCISES: CPT | Mod: CQ

## 2022-09-20 PROCEDURE — 97116 GAIT TRAINING THERAPY: CPT

## 2022-09-20 PROCEDURE — 25000003 PHARM REV CODE 250: Performed by: INTERNAL MEDICINE

## 2022-09-20 PROCEDURE — 11000004 HC SNF PRIVATE

## 2022-09-20 PROCEDURE — 97530 THERAPEUTIC ACTIVITIES: CPT | Mod: CQ

## 2022-09-20 RX ORDER — ASPIRIN 325 MG
325 TABLET ORAL DAILY
Status: DISCONTINUED | OUTPATIENT
Start: 2022-09-20 | End: 2022-09-30 | Stop reason: HOSPADM

## 2022-09-20 RX ADMIN — SENNOSIDES AND DOCUSATE SODIUM 1 TABLET: 50; 8.6 TABLET ORAL at 08:09

## 2022-09-20 RX ADMIN — METOPROLOL TARTRATE 25 MG: 25 TABLET, FILM COATED ORAL at 08:09

## 2022-09-20 RX ADMIN — FAMOTIDINE 20 MG: 20 TABLET, FILM COATED ORAL at 08:09

## 2022-09-20 RX ADMIN — LOSARTAN POTASSIUM 100 MG: 100 TABLET, FILM COATED ORAL at 08:09

## 2022-09-20 RX ADMIN — OXYCODONE HYDROCHLORIDE AND ACETAMINOPHEN 1 TABLET: 10; 325 TABLET ORAL at 08:09

## 2022-09-20 RX ADMIN — CELECOXIB 200 MG: 100 CAPSULE ORAL at 08:09

## 2022-09-20 RX ADMIN — ONDANSETRON 8 MG: 4 TABLET, ORALLY DISINTEGRATING ORAL at 11:09

## 2022-09-20 RX ADMIN — INSULIN DETEMIR 62 UNITS: 100 INJECTION, SOLUTION SUBCUTANEOUS at 08:09

## 2022-09-20 RX ADMIN — ZOLPIDEM TARTRATE 5 MG: 5 TABLET, COATED ORAL at 08:09

## 2022-09-20 RX ADMIN — OXYCODONE HYDROCHLORIDE AND ACETAMINOPHEN 1 TABLET: 10; 325 TABLET ORAL at 12:09

## 2022-09-20 RX ADMIN — OXYCODONE HYDROCHLORIDE AND ACETAMINOPHEN 1 TABLET: 10; 325 TABLET ORAL at 05:09

## 2022-09-20 RX ADMIN — PREGABALIN 75 MG: 75 CAPSULE ORAL at 08:09

## 2022-09-20 RX ADMIN — AMLODIPINE BESYLATE 5 MG: 2.5 TABLET ORAL at 08:09

## 2022-09-20 RX ADMIN — ASPIRIN 325 MG ORAL TABLET 325 MG: 325 PILL ORAL at 08:09

## 2022-09-20 NOTE — PLAN OF CARE
Problem: Adult Inpatient Plan of Care  Goal: Plan of Care Review  Outcome: Ongoing, Progressing  Goal: Absence of Hospital-Acquired Illness or Injury  Outcome: Ongoing, Progressing  Goal: Optimal Comfort and Wellbeing  Outcome: Ongoing, Progressing  Goal: Readiness for Transition of Care  Outcome: Ongoing, Progressing     Problem: Diabetes Comorbidity  Goal: Blood Glucose Level Within Targeted Range  Outcome: Ongoing, Progressing     Problem: Infection  Goal: Absence of Infection Signs and Symptoms  Outcome: Ongoing, Progressing      Subjective   Patient: Jose Penn  : 1927  Chart #: [id number]    Date of Service: 2018    CHIEF COMPLAINT: ***    HPI: ***    PMH:   Past Medical History:   Diagnosis Date   • Arthritis    • Carotid stenosis    • Headache    • Hypertension    • Stroke        Social History:   Social History   Substance Use Topics   • Smoking status: Former Smoker     Quit date:    • Smokeless tobacco: Never Used   • Alcohol use No       Allergies: Droperidol; Penicillins; Reglan [metoclopramide]; and Sulfa antibiotics    Medications:   Current Outpatient Prescriptions:   •  aspirin 81 MG EC tablet, Take 81 mg by mouth Daily., Disp: , Rfl:   •  clopidogrel (PLAVIX) 75 MG tablet, Take 1 tablet by mouth Every Other Day., Disp: 30 tablet, Rfl: 6  •  doxazosin (CARDURA) 4 MG tablet, , Disp: , Rfl:   •  lisinopril (PRINIVIL,ZESTRIL) 10 MG tablet, , Disp: , Rfl:   •  montelukast (SINGULAIR) 10 MG tablet, , Disp: , Rfl:   •  omeprazole (priLOSEC) 20 MG capsule, Take 20 mg by mouth Daily., Disp: , Rfl:   •  pantoprazole (PROTONIX) 40 MG EC tablet, , Disp: , Rfl:   •  polyethylene glycol (MIRALAX) powder, , Disp: , Rfl:   •  rosuvastatin (CRESTOR) 10 MG tablet, , Disp: , Rfl:   •  sertraline (ZOLOFT) 50 MG tablet, , Disp: , Rfl:     Review of Systems   Constitutional: Positive for fatigue.   HENT: Positive for ear pain, hearing loss and trouble swallowing.    Eyes: Negative.    Respiratory: Positive for wheezing.    Gastrointestinal: Negative.    Endocrine: Negative.    Genitourinary: Negative.    Musculoskeletal: Negative.    Skin: Negative.    Allergic/Immunologic: Negative.    Neurological: Positive for dizziness and headaches.   Hematological: Negative.    Psychiatric/Behavioral: Positive for decreased concentration. The patient is nervous/anxious.    All other systems reviewed and are negative.      Objective     Neurologic Exam    IMAGING/STUDIES: ***    Assessment/Plan     MEDICAL DECISION MAKING: ***    Dayana VERA  TAMEKA Nina  Patient Care Team:  Anil King MD as PCP - General (Family Medicine)  Anil King MD as Referring Physician (Family Medicine)

## 2022-09-20 NOTE — PT/OT/SLP PROGRESS
Physical Therapy  Treatment    Jonna Cunha   MRN: [id number]   Admitting Diagnosis: S/P total knee arthroplasty, left    PT Received On: 09/20/22  PT Start Time: 1:23  PT Stop Time: 1354    PT Total Time (min): 31 min       Billable Minutes:  Ther Ex 17, Ther-Act 14     Treatment Type: Treatment  PT/PTA: PTA     PTA visit number: 1     General Precautions: Standard, fall  Orthopedic Precautions: LLE weight bearing as tolerated   Braces: Knee immobilizer  Respiratory Status: Room air    Spiritual, Cultural Beliefs, Quaker Practices, Values that Affect Care: no    Subjective:  Pt reports knee pain but is compliant to move forward with therapy.     Pain/Comfort  Pain Rating 1: 8/10  Location - Side 1: Left  Location 1: knee    Objective:   Patient found with: cryotherapy, peripheral IV    Functional Mobility:  Bed Mobility: Supine<>Sit with CGA      Transfers: Sit<>stand with independence    Gait: Not Completed    Stairs: Not Completed      Balance:   Static Sit: NORMAL: No deviations seen in posture held statically  Dynamic Sit: NORMAL: No deviations seen in posture held dynamically  Static Stand: FAIR: Maintains without assist but unable to take challenges  Dynamic stand: FAIR: Needs CONTACT GUARD during gait       Therapeutic Activities and Exercises:     TherEx Reps   NuStep  10 minutes (Not Today)   Quad Sets 30x   Glute Sets 30x   Ankle pumps Not Today   SAQ 3 x 10   Heel Slides 20x (76 degrees)   Seated ADD 3 x 10   SLR 2 x 15   Horizontal abduction  3 x 10   Seated Marches 20x BLE   Hamstring Curls 2 x 10 yellow TB (Not Today)               TherAct    Heel Raises 2 x 10    Mini Squats 2 x 10   Sit<>Stands 3x   Marching  2 x 10 (Not Today)               AM-PAC 6 CLICK MOBILITY  How much help from another person does this patient currently need?   1 = Unable, Total/Dependent Assistance  2 = A lot, Maximum/Moderate Assistance  3 = A little, Minimum/Contact Guard/Supervision  4 = None, Modified  [place]/Independent    Turning over in bed (including adjusting bedclothes, sheets and blankets)?: 3  Sitting down on and standing up from a chair with arms (e.g., wheelchair, bedside commode, etc.): 3  Moving from lying on back to sitting on the side of the bed?: 3  Moving to and from a bed to a chair (including a wheelchair)?: 3  Need to walk in hospital room?: 3  Climbing 3-5 steps with a railing?: 2  Basic Mobility Total Score: 17    AM-PAC Raw Score CMS G-Code Modifier Level of Impairment Assistance   6 % Total / Unable   7 - 9 CM 80 - 100% Maximal Assist   10 - 14 CL 60 - 80% Moderate Assist   15 - 19 CK 40 - 60% Moderate Assist   20 - 22 CJ 20 - 40% Minimal Assist   23 CI 1-20% SBA / CGA   24 CH 0% Independent/ Mod I         Cryocuff to Left knee with Left knee elevated greater than heart level at end of treatment session.     Assessment:  Jonna Cunha is a 67 y.o. female with a medical diagnosis of S/P total knee arthroplasty, left and presents with pain and impaired mobility.  Pt displayed great motivation to complete tx session. Pt required verbal cues during treatment for proper form and hold times of exercises. Pt required minimal rest breaks compared to previous tx. LPTA educated pt on pushing up from chair when standing to increase safety awareness. Upon completion of tx session, pt left in [place] with  present.      Rehab identified problem list/impairments:     Rehab potential is good.    Activity tolerance: Good    Discharge recommendations: Discharge Facility/Level of Care Needs: outpatient PT     Barriers to discharge:      Equipment recommendations: Equipment Needed After Discharge: walker, rolling     GOALS:   Multidisciplinary Problems       Physical Therapy Goals          Problem: Physical Therapy    Goal Priority Disciplines Outcome Goal Variances Interventions   Physical Therapy Goal     PT, PT/OT Ongoing, Progressing     Description: Short Term Goals  1. Patient  will complete 30 reps of B LE exercises with correct form.   2. Patient will complete sit<>stand transfers with SBA.  3. Patient will ambulate 100 feet with RW on level surfaces with CGA.     Long Term Goals   1. Patient will ambulate 300 feet with RW on level and unlevel surfaces with SBA.  2. Patient will complete all functional transfers with MOD I.  3. L knee AROM 0-110.   4. Patient will negotiate up and down 5 stairs with use of handrail with SBA.                          PLAN:    Patient to be seen BID  to address the above listed problems via therapeutic exercises  Plan of Care expires: 10/06/22  Plan of Care reviewed with: patient    Continue POC Per PT order to progress patient toward rehab goals as tolerated by patient.      DANNIE Cruz    9/20/2022

## 2022-09-20 NOTE — PLAN OF CARE
Problem: Adult Inpatient Plan of Care  Goal: Plan of Care Review  Outcome: Ongoing, Progressing  Flowsheets (Taken 9/19/2022 2203)  Plan of Care Reviewed With: patient  Goal: Patient-Specific Goal (Individualized)  Outcome: Ongoing, Progressing  Goal: Absence of Hospital-Acquired Illness or Injury  Outcome: Ongoing, Progressing  Intervention: Identify and Manage Fall Risk  Flowsheets (Taken 9/19/2022 2203)  Safety Promotion/Fall Prevention:   assistive device/personal item within reach   nonskid shoes/socks when out of bed   side rails raised x 2  Intervention: Prevent Skin Injury  Flowsheets (Taken 9/19/2022 2203)  Body Position:   position changed independently   neutral body alignment   neutral head position  Skin Protection:   adhesive use limited   skin-to-device areas padded   skin-to-skin areas padded  Intervention: Prevent and Manage VTE (Venous Thromboembolism) Risk  Flowsheets (Taken 9/19/2022 2203)  Activity Management:   Ankle pumps - L1   Arm raise - L1  VTE Prevention/Management:   ambulation promoted   fluids promoted  Range of Motion: active ROM (range of motion) encouraged  Intervention: Prevent Infection  Flowsheets (Taken 9/19/2022 2203)  Infection Prevention:   equipment surfaces disinfected   hand hygiene promoted   personal protective equipment utilized   rest/sleep promoted  Goal: Optimal Comfort and Wellbeing  Outcome: Ongoing, Progressing  Intervention: Monitor Pain and Promote Comfort  Flowsheets (Taken 9/19/2022 2203)  Pain Management Interventions:   pillow support provided   position adjusted   relaxation techniques promoted   quiet environment facilitated  Intervention: Provide Person-Centered Care  Flowsheets (Taken 9/19/2022 2203)  Trust Relationship/Rapport:   care explained   choices provided   emotional support provided   empathic listening provided   questions answered   questions encouraged   reassurance provided   thoughts/feelings acknowledged  Goal: Readiness for Transition  of Care  Outcome: Ongoing, Progressing      Statement Selected

## 2022-09-20 NOTE — PT/OT/SLP PROGRESS
"Occupational Therapy  Treatment    Jonna Cunha   MRN: [id number]   Admitting Diagnosis: S/P total knee arthroplasty, left    OT Date of Treatment: 09/20/22   OT Start Time: 0833  OT Stop Time: 0900  OT Total Time (min): 27 min    Billable Minutes:  Therapeutic Exercise 27 min               General Precautions: Standard, fall  Orthopedic Precautions: LLE weight bearing as tolerated  Braces:    Respiratory Status: Room air         Subjective:  Communicated with RN prior to session.    Pain/Comfort  Pain Rating 1: 7/10    Objective:        Functional Mobility:  Bed Mobility:       Transfers:        Functional Ambulation:     Activities of Daily Living:         Therapeutic Activities and Exercises:  Patient completed the following for increased strength to increase I with ADLs: 3# dowel- shoulder press, chest press, bicep curls x 40, yellow theraflex x 40 both ways, yellow theraband- scapular retraction and tricep extension x 40; UBE 6 min      AM-PAC 6 CLICK ADL   How much help from another person does this patient currently need?   1 = Unable, Total/Dependent Assistance  2 = A lot, Maximum/Moderate Assistance  3 = A little, Minimum/Contact Guard/Supervision  4 = None, Modified [place]/Independent    Putting on and taking off regular lower body clothing? : 3  Bathing (including washing, rinsing, drying)?: 3  Toileting, which includes using toilet, bedpan, or urinal? : 3  Putting on and taking off regular upper body clothing?: 4  Taking care of personal grooming such as brushing teeth?: 4  Eating meals?: 4  Daily Activity Total Score: 21     AM-PAC Raw Score CMS "G-Code Modifier Level of Impairment Assistance   6 % Total / Unable   7 - 8 CM 80 - 100% Maximal Assist   9-13 CL 60 - 80% Moderate Assist   14 - 19 CK 40 - 60% Moderate Assist   20 - 22 CJ 20 - 40% Minimal Assist   23 CI 1-20% SBA / CGA   24 CH 0% Independent/ Mod I       Patient left up in chair with  PT notified    ASSESSMENT:  Jonna Cunha is " a 67 y.o. female with a medical diagnosis of S/P total knee arthroplasty, left and presents with decreased strength, decreased endurance, decreased self-care, decreased mobility.    Rehab identified problem list/impairments: Rehab identified problem list/impairments: weakness, impaired endurance, impaired self care skills, impaired functional mobility, impaired balance, decreased lower extremity function, decreased safety awareness    Rehab potential is excellent.    Activity tolerance: Excellent    Discharge recommendations: Discharge Facility/Level of Care Needs: outpatient PT     Barriers to discharge:      Equipment recommendations: none     GOALS:   Multidisciplinary Problems       Occupational Therapy Goals          Problem: Occupational Therapy    Goal Priority Disciplines Outcome Interventions   Occupational Therapy Goal     OT, PT/OT Ongoing, Progressing    Description: Description: Grooming Status:   Short Term Goal: Pt will perform grooming with s/u sitting EOB.   Long Term Goal: Pt will perform grooming/oral hygiene standing at sink with Mod I      LE dressing Status:   Short Term Goal: Pt will perform LE dressing with mod a.   Long Term Goal: Pt will perform LE dressing with min a.    Toileting Status:   Short Term Goal: Pt will perform toilet hygiene on BSC with s/u.  Long Term Goal: Pt will perform toilet hygiene on toilet with no AE with Mod I.    Commode Transfer:   Short Term Goal: Pt will perform BSC t/f with s/u.  Long Term Goal:  Pt will perform toilet t/f in bathroom with Mod I.     Bathing Status:   Long Term Goal: Pt will perform sponge bath with s/u with no unsafe fatigue.     Strength Status:   Long Term Goal: Pt to perform BUE strengthening with weights and/or body weight to increase ADL independence and safety    Endurance Status:   Short Term Goal:pt to perform 15 min OT treatment with 5 or greater rest breaks  Long Term Goal: pt to perform 30 min OT treat with 3 or less rest breaks                          Plan:  Patient to be seen 5 x/week to address the above listed problems via therapeutic exercises  Plan of Care expires:    Plan of Care reviewed with: patient       Negra Rachael, OTR/CRISTINA    09/20/2022

## 2022-09-20 NOTE — NURSING
"When pt asked if prn pain med relieved pain she states "Not really, the other medicine worked better". Will make Dr. Fischer aware during her rounds.  "

## 2022-09-20 NOTE — NURSING
"Pt called nurse to  for concern of new onset incontinence. Pt denies urgency, frequency, or burning. Pt states "I just wet my pants when I get a few steps from the toilet". Pad or diaper offered, pt states her daughter is going to bring in some depends for her. Charge nurse made aware.   "

## 2022-09-20 NOTE — PT/OT/SLP PROGRESS
"Physical Therapy  Treatment    Jonna Cunha   MRN: [id number]   Admitting Diagnosis: S/P total knee arthroplasty, left    PT Received On: 09/20/22  PT Start Time: 9:01  PT Stop Time: 0942    PT Total Time (min): 41 min       Billable Minutes: 41  Gait training 8, Ther Ex 17, Ther-Act 16     Treatment Type: Treatment  PT/PTA: PT     PTA visit number: 3     General Precautions: Standard, fall  Orthopedic Precautions: LLE weight bearing as tolerated   Braces: Knee immobilizer  Respiratory Status: Room air    Spiritual, Cultural Beliefs, Congregational Practices, Values that Affect Care: no    Subjective:  Pt. Is received from occupational therapist in rehab gym.  Patient reports knee pain, but is agreeable to PT treatment.     Pain/Comfort  Pain Rating 1: 6/10  Location - Side 1: Left  Location 1: knee  Pain Addressed 1: Reposition    Objective:        Functional Mobility:  Bed Mobility:   Supine<>Sit with CGA      Transfers: Not Completed    Gait: Approx. 150' with RW CGA on level indoor surface with cueing and demonstration from PT to progress to step through gait pattern with improved L stance time       Stairs: Not Completed      Balance:   Static Sit: NORMAL: No deviations seen in posture held statically  Dynamic Sit: NORMAL: No deviations seen in posture held dynamically  Static Stand: FAIR: Maintains without assist but unable to take challenges  Dynamic stand: FAIR: Needs CONTACT GUARD during gait       Therapeutic Activities and Exercises:     TherEx Reps   NuStep  10 minutes    Quad Sets    Glute Sets    Ankle pumps    SAQ    Heel Slides    Seated ADD    SLR    Horizontal abduction     Seated Marches    LAQs  2 x 10 3"   HS curls  2 x 10 yellow TB    Heel Slides  3 x 10        TherAct    Heel Raises 2 x 10    Mini Squats    Sit<>Stands 2 x 10    Marching  2 x 10                AM-PAC 6 CLICK MOBILITY  How much help from another person does this patient currently need?   1 = Unable, Total/Dependent Assistance  2 = A " lot, Maximum/Moderate Assistance  3 = A little, Minimum/Contact Guard/Supervision  4 = None, Modified [place]/Independent    Turning over in bed (including adjusting bedclothes, sheets and blankets)?: 3  Sitting down on and standing up from a chair with arms (e.g., wheelchair, bedside commode, etc.): 3  Moving from lying on back to sitting on the side of the bed?: 3  Moving to and from a bed to a chair (including a wheelchair)?: 3  Need to walk in hospital room?: 3  Climbing 3-5 steps with a railing?: 2  Basic Mobility Total Score: 17    AM-PAC Raw Score CMS G-Code Modifier Level of Impairment Assistance   6 % Total / Unable   7 - 9 CM 80 - 100% Maximal Assist   10 - 14 CL 60 - 80% Moderate Assist   15 - 19 CK 40 - 60% Moderate Assist   20 - 22 CJ 20 - 40% Minimal Assist   23 CI 1-20% SBA / CGA   24 CH 0% Independent/ Mod I         Cryocuff to Left knee with Left knee elevated greater than heart level at end of treatment session.     Assessment:  Jonna Cunha is a 67 y.o. female with a medical diagnosis of S/P total knee arthroplasty, left and presents with pain and impaired mobility.  Patient requires mod verbal and visual cues to complete Ther Ex with proper alignment, speed of movement, count, and hold times. Patient with 97 degrees of L knee flexion in seated position. Patient required frequent rest breaks during standing tasks due to fatigue. Patient had no reports on increased pain or adverse effects to treatment.       Rehab identified problem list/impairments:     Rehab potential is good.    Activity tolerance: Good    Discharge recommendations: Discharge Facility/Level of Care Needs: outpatient PT     Barriers to discharge:      Equipment recommendations: Equipment Needed After Discharge: walker, rolling     GOALS:   Multidisciplinary Problems       Physical Therapy Goals          Problem: Physical Therapy    Goal Priority Disciplines Outcome Goal Variances Interventions   Physical Therapy  Goal     PT, PT/OT Ongoing, Progressing     Description: Short Term Goals  1. Patient will complete 30 reps of B LE exercises with correct form.   2. Patient will complete sit<>stand transfers with SBA.  3. Patient will ambulate 100 feet with RW on level surfaces with CGA.     Long Term Goals   1. Patient will ambulate 300 feet with RW on level and unlevel surfaces with SBA.  2. Patient will complete all functional transfers with MOD I.  3. L knee AROM 0-110.   4. Patient will negotiate up and down 5 stairs with use of handrail with SBA.                          PLAN:    Patient to be seen BID  to address the above listed problems via gait training, therapeutic activities, therapeutic exercises  Plan of Care expires: 10/06/22  Plan of Care reviewed with: patient    Continue POC Per PT order to progress patient toward rehab goals as tolerated by patient.    Nelda Silva, PT, DPT     9/20/2022

## 2022-09-21 LAB
BILIRUB UR QL STRIP: NEGATIVE
CLARITY UR: CLEAR
COLOR UR: YELLOW
GLUCOSE SERPL-MCNC: 139 MG/DL (ref 70–105)
GLUCOSE UR STRIP-MCNC: NEGATIVE MG/DL
KETONES UR STRIP-SCNC: NEGATIVE MG/DL
LEUKOCYTE ESTERASE UR QL STRIP: NEGATIVE
NITRITE UR QL STRIP: NEGATIVE
PH UR STRIP: 5.5 PH UNITS
PROT UR QL STRIP: NEGATIVE
RBC # UR STRIP: NEGATIVE /UL
SP GR UR STRIP: 1.02
UROBILINOGEN UR STRIP-ACNC: 1 MG/DL

## 2022-09-21 PROCEDURE — 99900035 HC TECH TIME PER 15 MIN (STAT)

## 2022-09-21 PROCEDURE — 25000003 PHARM REV CODE 250: Performed by: INTERNAL MEDICINE

## 2022-09-21 PROCEDURE — 11000004 HC SNF PRIVATE

## 2022-09-21 PROCEDURE — 97110 THERAPEUTIC EXERCISES: CPT

## 2022-09-21 PROCEDURE — 25000003 PHARM REV CODE 250: Performed by: FAMILY MEDICINE

## 2022-09-21 PROCEDURE — 81003 URINALYSIS AUTO W/O SCOPE: CPT | Performed by: FAMILY MEDICINE

## 2022-09-21 PROCEDURE — 97110 THERAPEUTIC EXERCISES: CPT | Mod: CQ

## 2022-09-21 PROCEDURE — 97116 GAIT TRAINING THERAPY: CPT | Mod: CQ

## 2022-09-21 PROCEDURE — 94761 N-INVAS EAR/PLS OXIMETRY MLT: CPT

## 2022-09-21 PROCEDURE — 63600175 PHARM REV CODE 636 W HCPCS: Performed by: INTERNAL MEDICINE

## 2022-09-21 PROCEDURE — 82962 GLUCOSE BLOOD TEST: CPT

## 2022-09-21 RX ADMIN — METOPROLOL TARTRATE 25 MG: 25 TABLET, FILM COATED ORAL at 09:09

## 2022-09-21 RX ADMIN — CELECOXIB 200 MG: 100 CAPSULE ORAL at 09:09

## 2022-09-21 RX ADMIN — DOCUSATE SODIUM 100 MG: 100 CAPSULE, LIQUID FILLED ORAL at 09:09

## 2022-09-21 RX ADMIN — OXYCODONE HYDROCHLORIDE AND ACETAMINOPHEN 1 TABLET: 10; 325 TABLET ORAL at 08:09

## 2022-09-21 RX ADMIN — LOSARTAN POTASSIUM 100 MG: 100 TABLET, FILM COATED ORAL at 09:09

## 2022-09-21 RX ADMIN — FAMOTIDINE 20 MG: 20 TABLET, FILM COATED ORAL at 08:09

## 2022-09-21 RX ADMIN — ASPIRIN 325 MG ORAL TABLET 325 MG: 325 PILL ORAL at 09:09

## 2022-09-21 RX ADMIN — OXYCODONE HYDROCHLORIDE AND ACETAMINOPHEN 1 TABLET: 10; 325 TABLET ORAL at 01:09

## 2022-09-21 RX ADMIN — INSULIN DETEMIR 62 UNITS: 100 INJECTION, SOLUTION SUBCUTANEOUS at 08:09

## 2022-09-21 RX ADMIN — SENNOSIDES AND DOCUSATE SODIUM 1 TABLET: 50; 8.6 TABLET ORAL at 09:09

## 2022-09-21 RX ADMIN — SENNOSIDES AND DOCUSATE SODIUM 1 TABLET: 50; 8.6 TABLET ORAL at 08:09

## 2022-09-21 RX ADMIN — AMLODIPINE BESYLATE 5 MG: 2.5 TABLET ORAL at 09:09

## 2022-09-21 RX ADMIN — OXYCODONE HYDROCHLORIDE AND ACETAMINOPHEN 1 TABLET: 10; 325 TABLET ORAL at 04:09

## 2022-09-21 RX ADMIN — FAMOTIDINE 20 MG: 20 TABLET, FILM COATED ORAL at 09:09

## 2022-09-21 RX ADMIN — OXYCODONE HYDROCHLORIDE AND ACETAMINOPHEN 1 TABLET: 10; 325 TABLET ORAL at 09:09

## 2022-09-21 RX ADMIN — CELECOXIB 200 MG: 100 CAPSULE ORAL at 08:09

## 2022-09-21 RX ADMIN — PREGABALIN 75 MG: 75 CAPSULE ORAL at 09:09

## 2022-09-21 RX ADMIN — PREGABALIN 75 MG: 75 CAPSULE ORAL at 08:09

## 2022-09-21 RX ADMIN — METOPROLOL TARTRATE 25 MG: 25 TABLET, FILM COATED ORAL at 08:09

## 2022-09-21 NOTE — PT/OT/SLP PROGRESS
"Physical Therapy  Treatment    Jonna Cunha   MRN: [id number]   Admitting Diagnosis: S/P total knee arthroplasty, left    PT Received On: 09/21/22  PT Start Time: 1:23  PT Stop Time: 0920    PT Total Time (min): 40 min       Billable Minutes:  Ther Ex 30, Gait 10       Treatment Type: Treatment  PT/PTA: PTA     PTA visit number: 1     General Precautions: Standard, fall  Orthopedic Precautions: LLE weight bearing as tolerated   Braces: Knee immobilizer  Respiratory Status: Room air    Spiritual, Cultural Beliefs, Tenriism Practices, Values that Affect Care: no    Subjective:  Pt is agreeable to am physical therapy treatment session, and is received from occupational therapy in rehab gym.  Patient without new complaints.      Pain/Comfort  Pain Rating 1: 5/10  Location - Side 1: Left  Location - Orientation 1: generalized  Location 1: knee  Pain Addressed 1: Reposition    Objective:        Functional Mobility:  Bed Mobility: Supine<>Sit with CGA      Transfers: Sit<>stand to RW SBA     Gait: Approx. 120' x 2 with RW and CGA on level indoor surface with step through pattern     Stairs: Not Completed      Balance:   Static Sit: NORMAL: No deviations seen in posture held statically  Dynamic Sit: NORMAL: No deviations seen in posture held dynamically  Static Stand: FAIR: Maintains without assist but unable to take challenges  Dynamic stand: FAIR: Needs CONTACT GUARD during gait       Therapeutic Activities and Exercises:     TherEx Reps   NuStep  10 minutes    Quad Sets 30 x 5"    Glute Sets 30 x 5"    Ankle pumps 30 x    SAQ 3 x 10   Heel Slides 30 x seated (94 degrees) *    Seated ADD 3 x 10 with ball    SLR    Horizontal abduction     Seated Marches    Hamstring Curls                TherAct    Heel Raises    Mini Squats    Sit<>Stands    Marching                 AM-[facility] 6 CLICK MOBILITY  How much help from another person does this patient currently need?   1 = Unable, Total/Dependent Assistance  2 = A lot, " Maximum/Moderate Assistance  3 = A little, Minimum/Contact Guard/Supervision  4 = None, Modified [place]/Independent    Turning over in bed (including adjusting bedclothes, sheets and blankets)?: 3  Sitting down on and standing up from a chair with arms (e.g., wheelchair, bedside commode, etc.): 3  Moving from lying on back to sitting on the side of the bed?: 3  Moving to and from a bed to a chair (including a wheelchair)?: 3  Need to walk in hospital room?: 3  Climbing 3-5 steps with a railing?: 2 (Not attempted)  Basic Mobility Total Score: 17    AM-PAC Raw Score CMS G-Code Modifier Level of Impairment Assistance   6 % Total / Unable   7 - 9 CM 80 - 100% Maximal Assist   10 - 14 CL 60 - 80% Moderate Assist   15 - 19 CK 40 - 60% Moderate Assist   20 - 22 CJ 20 - 40% Minimal Assist   23 CI 1-20% SBA / CGA   24 CH 0% Independent/ Mod I         Cryocuff to Left knee with Left knee elevated greater than heart level at end of treatment session.     Assessment:  Jonna Cunha is a 67 y.o. female with a medical diagnosis of S/P total knee arthroplasty, left and presents with pain and impaired mobility. Patient presents with mod carryover to complete Ther Ex with proper technique, count, and hold times.  AROM Left knee extension to flexion 0-94 degrees at end of treatment session. Patient reports mod fatigue at end of treatment session. No adverse effects noted.       Rehab identified problem list/impairments:     Rehab potential is good.    Activity tolerance: Good    Discharge recommendations: Discharge Facility/Level of Care Needs: outpatient PT     Barriers to discharge:      Equipment recommendations: Equipment Needed After Discharge: walker, rolling     GOALS:   Multidisciplinary Problems       Physical Therapy Goals          Problem: Physical Therapy    Goal Priority Disciplines Outcome Goal Variances Interventions   Physical Therapy Goal     PT, PT/OT Ongoing, Progressing     Description: Short Term  Goals  1. Patient will complete 30 reps of B LE exercises with correct form.   2. Patient will complete sit<>stand transfers with SBA.  3. Patient will ambulate 100 feet with RW on level surfaces with CGA.     Long Term Goals   1. Patient will ambulate 300 feet with RW on level and unlevel surfaces with SBA.  2. Patient will complete all functional transfers with MOD I.  3. L knee AROM 0-110.   4. Patient will negotiate up and down 5 stairs with use of handrail with SBA.                          PLAN:    Patient to be seen BID  to address the above listed problems via gait training, therapeutic activities, therapeutic exercises  Plan of Care expires: 10/06/22  Plan of Care reviewed with: patient    Continue POC Per PT order to progress patient toward rehab goals as tolerated by patient.    DANNIE Angel     9/21/2022

## 2022-09-21 NOTE — PT/OT/SLP PROGRESS
"Occupational Therapy  Treatment    Jonna Cunha   MRN: [id number]   Admitting Diagnosis: S/P total knee arthroplasty, left    OT Date of Treatment: 09/21/22   OT Start Time: 0809  OT Stop Time: 0834  OT Total Time (min): 25 min    Billable Minutes:  Therapeutic Exercise 25 min               General Precautions: Standard, fall  Orthopedic Precautions: LLE weight bearing as tolerated  Braces:    Respiratory Status: Room air         Subjective:  Communicated with RN prior to session.    Pain/Comfort  Pain Rating 1: 5/10    Objective:        Functional Mobility:  Bed Mobility:       Transfers:        Functional Ambulation:     Activities of Daily Living:         Therapeutic Activities and Exercises:  Patient completed the following for increased strength to increase I with ADLs: 3# dowel- shoulder press, chest press, bicep curls x 40, yellow theraflex x 40 both ways, yellow theraband- scapular retraction and tricep extension x 40; UBE 6 min      AM-PAC 6 CLICK ADL   How much help from another person does this patient currently need?   1 = Unable, Total/Dependent Assistance  2 = A lot, Maximum/Moderate Assistance  3 = A little, Minimum/Contact Guard/Supervision  4 = None, Modified [place]/Independent    Putting on and taking off regular lower body clothing? : 3  Bathing (including washing, rinsing, drying)?: 3  Toileting, which includes using toilet, bedpan, or urinal? : 3  Putting on and taking off regular upper body clothing?: 4  Taking care of personal grooming such as brushing teeth?: 4  Eating meals?: 4  Daily Activity Total Score: 21     AM-PAC Raw Score CMS "G-Code Modifier Level of Impairment Assistance   6 % Total / Unable   7 - 8 CM 80 - 100% Maximal Assist   9-13 CL 60 - 80% Moderate Assist   14 - 19 CK 40 - 60% Moderate Assist   20 - 22 CJ 20 - 40% Minimal Assist   23 CI 1-20% SBA / CGA   24 CH 0% Independent/ Mod I       Patient left up in chair with  PT notified    ASSESSMENT:  Jonna Cunha is " a 67 y.o. female with a medical diagnosis of S/P total knee arthroplasty, left and presents with decreased strength, decreased endurance, decreased self-care, decreased mobility.    Rehab identified problem list/impairments: Rehab identified problem list/impairments: weakness, impaired endurance, impaired self care skills, impaired functional mobility, impaired balance, decreased lower extremity function    Rehab potential is excellent.    Activity tolerance: Excellent    Discharge recommendations: Discharge Facility/Level of Care Needs: outpatient PT     Barriers to discharge:      Equipment recommendations: walker, rolling     GOALS:   Multidisciplinary Problems       Occupational Therapy Goals          Problem: Occupational Therapy    Goal Priority Disciplines Outcome Interventions   Occupational Therapy Goal     OT, PT/OT Ongoing, Progressing    Description: Description: Grooming Status:   Short Term Goal: Pt will perform grooming with s/u sitting EOB.   Long Term Goal: Pt will perform grooming/oral hygiene standing at sink with Mod I      LE dressing Status:   Short Term Goal: Pt will perform LE dressing with mod a.   Long Term Goal: Pt will perform LE dressing with min a.    Toileting Status:   Short Term Goal: Pt will perform toilet hygiene on BSC with s/u.  Long Term Goal: Pt will perform toilet hygiene on toilet with no AE with Mod I.    Commode Transfer:   Short Term Goal: Pt will perform BSC t/f with s/u.  Long Term Goal:  Pt will perform toilet t/f in bathroom with Mod I.     Bathing Status:   Long Term Goal: Pt will perform sponge bath with s/u with no unsafe fatigue.     Strength Status:   Long Term Goal: Pt to perform BUE strengthening with weights and/or body weight to increase ADL independence and safety    Endurance Status:   Short Term Goal:pt to perform 15 min OT treatment with 5 or greater rest breaks  Long Term Goal: pt to perform 30 min OT treat with 3 or less rest breaks                          Plan:  Patient to be seen 5 x/week to address the above listed problems via therapeutic exercises  Plan of Care expires:    Plan of Care reviewed with: patient       Negra Rachael, OTR/CRISTINA    09/21/2022

## 2022-09-21 NOTE — PLAN OF CARE
Problem: Diabetes Comorbidity  Goal: Blood Glucose Level Within Targeted Range  Outcome: Ongoing, Progressing  Intervention: Monitor and Manage Glycemia  Flowsheets (Taken 9/21/2022 1846)  Glycemic Management: blood glucose monitored     Problem: Infection  Goal: Absence of Infection Signs and Symptoms  Outcome: Ongoing, Progressing  Intervention: Prevent or Manage Infection  Flowsheets (Taken 9/21/2022 1846)  Fever Reduction/Comfort Measures: fluid intake increased  Infection Management: aseptic technique maintained  Isolation Precautions: other (see comments)

## 2022-09-21 NOTE — PLAN OF CARE
Problem: Adult Inpatient Plan of Care  Goal: Plan of Care Review  Outcome: Ongoing, Progressing  Flowsheets (Taken 9/20/2022 2131)  Plan of Care Reviewed With: patient  Goal: Patient-Specific Goal (Individualized)  Outcome: Ongoing, Progressing  Goal: Absence of Hospital-Acquired Illness or Injury  Outcome: Ongoing, Progressing  Goal: Optimal Comfort and Wellbeing  Outcome: Ongoing, Progressing  Goal: Readiness for Transition of Care  Outcome: Ongoing, Progressing

## 2022-09-21 NOTE — PROGRESS NOTES
Pt states that she is in too much pain to attend PT tx despite having pain medication.  DANNIE Cruz

## 2022-09-22 LAB
ANION GAP SERPL CALCULATED.3IONS-SCNC: 9 MMOL/L (ref 7–16)
BASOPHILS # BLD AUTO: 0.04 K/UL (ref 0–0.2)
BASOPHILS NFR BLD AUTO: 0.6 % (ref 0–1)
BUN SERPL-MCNC: 19 MG/DL (ref 7–18)
BUN/CREAT SERPL: 20 (ref 6–20)
CALCIUM SERPL-MCNC: 10.3 MG/DL (ref 8.5–10.1)
CHLORIDE SERPL-SCNC: 106 MMOL/L (ref 98–107)
CO2 SERPL-SCNC: 31 MMOL/L (ref 21–32)
CREAT SERPL-MCNC: 0.96 MG/DL (ref 0.55–1.02)
DIFFERENTIAL METHOD BLD: ABNORMAL
EGFR (NO RACE VARIABLE) (RUSH/TITUS): 65 ML/MIN/1.73M²
EOSINOPHIL # BLD AUTO: 0.28 K/UL (ref 0–0.5)
EOSINOPHIL NFR BLD AUTO: 4.3 % (ref 1–4)
ERYTHROCYTE [DISTWIDTH] IN BLOOD BY AUTOMATED COUNT: 12.9 % (ref 11.5–14.5)
GLUCOSE SERPL-MCNC: 124 MG/DL (ref 74–106)
HCT VFR BLD AUTO: 29.1 % (ref 38–47)
HGB BLD-MCNC: 9.4 G/DL (ref 12–16)
IMM GRANULOCYTES # BLD AUTO: 0.03 K/UL (ref 0–0.04)
IMM GRANULOCYTES NFR BLD: 0.5 % (ref 0–0.4)
LYMPHOCYTES # BLD AUTO: 2.49 K/UL (ref 1–4.8)
LYMPHOCYTES NFR BLD AUTO: 38.5 % (ref 27–41)
MCH RBC QN AUTO: 29.3 PG (ref 27–31)
MCHC RBC AUTO-ENTMCNC: 32.3 G/DL (ref 32–36)
MCV RBC AUTO: 90.7 FL (ref 80–96)
MONOCYTES # BLD AUTO: 0.34 K/UL (ref 0–0.8)
MONOCYTES NFR BLD AUTO: 5.3 % (ref 2–6)
MPC BLD CALC-MCNC: 9.9 FL (ref 9.4–12.4)
NEUTROPHILS # BLD AUTO: 3.29 K/UL (ref 1.8–7.7)
NEUTROPHILS NFR BLD AUTO: 50.8 % (ref 53–65)
PLATELET # BLD AUTO: 278 K/UL (ref 150–400)
POTASSIUM SERPL-SCNC: 4.1 MMOL/L (ref 3.5–5.1)
RBC # BLD AUTO: 3.21 M/UL (ref 4.2–5.4)
SODIUM SERPL-SCNC: 142 MMOL/L (ref 136–145)
WBC # BLD AUTO: 6.47 K/UL (ref 4.5–11)

## 2022-09-22 PROCEDURE — 97530 THERAPEUTIC ACTIVITIES: CPT

## 2022-09-22 PROCEDURE — 25000003 PHARM REV CODE 250: Performed by: INTERNAL MEDICINE

## 2022-09-22 PROCEDURE — 11000004 HC SNF PRIVATE

## 2022-09-22 PROCEDURE — 97110 THERAPEUTIC EXERCISES: CPT

## 2022-09-22 PROCEDURE — 63600175 PHARM REV CODE 636 W HCPCS: Performed by: INTERNAL MEDICINE

## 2022-09-22 PROCEDURE — 97116 GAIT TRAINING THERAPY: CPT

## 2022-09-22 PROCEDURE — 85025 COMPLETE CBC W/AUTO DIFF WBC: CPT | Performed by: INTERNAL MEDICINE

## 2022-09-22 PROCEDURE — 99900035 HC TECH TIME PER 15 MIN (STAT)

## 2022-09-22 PROCEDURE — 80048 BASIC METABOLIC PNL TOTAL CA: CPT | Performed by: INTERNAL MEDICINE

## 2022-09-22 PROCEDURE — 94761 N-INVAS EAR/PLS OXIMETRY MLT: CPT

## 2022-09-22 PROCEDURE — 25000003 PHARM REV CODE 250: Performed by: FAMILY MEDICINE

## 2022-09-22 PROCEDURE — 36415 COLL VENOUS BLD VENIPUNCTURE: CPT | Performed by: INTERNAL MEDICINE

## 2022-09-22 PROCEDURE — 63600175 PHARM REV CODE 636 W HCPCS: Performed by: FAMILY MEDICINE

## 2022-09-22 RX ORDER — KETOROLAC TROMETHAMINE 30 MG/ML
60 INJECTION, SOLUTION INTRAMUSCULAR; INTRAVENOUS ONCE
Status: COMPLETED | OUTPATIENT
Start: 2022-09-22 | End: 2022-09-22

## 2022-09-22 RX ADMIN — AMLODIPINE BESYLATE 5 MG: 2.5 TABLET ORAL at 09:09

## 2022-09-22 RX ADMIN — PREGABALIN 75 MG: 75 CAPSULE ORAL at 08:09

## 2022-09-22 RX ADMIN — METOPROLOL TARTRATE 25 MG: 25 TABLET, FILM COATED ORAL at 09:09

## 2022-09-22 RX ADMIN — METOPROLOL TARTRATE 25 MG: 25 TABLET, FILM COATED ORAL at 08:09

## 2022-09-22 RX ADMIN — OXYCODONE HYDROCHLORIDE AND ACETAMINOPHEN 1 TABLET: 10; 325 TABLET ORAL at 01:09

## 2022-09-22 RX ADMIN — ASPIRIN 325 MG ORAL TABLET 325 MG: 325 PILL ORAL at 09:09

## 2022-09-22 RX ADMIN — SENNOSIDES AND DOCUSATE SODIUM 1 TABLET: 50; 8.6 TABLET ORAL at 09:09

## 2022-09-22 RX ADMIN — OXYCODONE HYDROCHLORIDE AND ACETAMINOPHEN 1 TABLET: 10; 325 TABLET ORAL at 08:09

## 2022-09-22 RX ADMIN — LOSARTAN POTASSIUM 100 MG: 100 TABLET, FILM COATED ORAL at 09:09

## 2022-09-22 RX ADMIN — ZOLPIDEM TARTRATE 5 MG: 5 TABLET, COATED ORAL at 08:09

## 2022-09-22 RX ADMIN — OXYCODONE HYDROCHLORIDE AND ACETAMINOPHEN 1 TABLET: 10; 325 TABLET ORAL at 09:09

## 2022-09-22 RX ADMIN — CELECOXIB 200 MG: 100 CAPSULE ORAL at 08:09

## 2022-09-22 RX ADMIN — FAMOTIDINE 20 MG: 20 TABLET, FILM COATED ORAL at 08:09

## 2022-09-22 RX ADMIN — FAMOTIDINE 20 MG: 20 TABLET, FILM COATED ORAL at 09:09

## 2022-09-22 RX ADMIN — KETOROLAC TROMETHAMINE 60 MG: 30 INJECTION, SOLUTION INTRAMUSCULAR at 09:09

## 2022-09-22 RX ADMIN — INSULIN DETEMIR 62 UNITS: 100 INJECTION, SOLUTION SUBCUTANEOUS at 08:09

## 2022-09-22 RX ADMIN — PREGABALIN 75 MG: 75 CAPSULE ORAL at 09:09

## 2022-09-22 RX ADMIN — CELECOXIB 200 MG: 100 CAPSULE ORAL at 09:09

## 2022-09-22 RX ADMIN — SENNOSIDES AND DOCUSATE SODIUM 1 TABLET: 50; 8.6 TABLET ORAL at 08:09

## 2022-09-22 NOTE — PT/OT/SLP PROGRESS
"Occupational Therapy  Treatment    Jonna Cunha   MRN: [id number]   Admitting Diagnosis: S/P total knee arthroplasty, left    OT Date of Treatment: 09/22/22   OT Start Time: 1213  OT Stop Time: 1245  OT Total Time (min): 32 min    Billable Minutes:  Therapeutic Exercise 25 min  Charges were adjusted due to patient being seen with an outpatient             General Precautions: Standard, fall  Orthopedic Precautions: LLE weight bearing as tolerated  Braces:    Respiratory Status: Room air         Subjective:  Communicated with RN prior to session.    Pain/Comfort  Pain Rating 1: 0/10    Objective:        Functional Mobility:  Bed Mobility:       Transfers:        Functional Ambulation:     Activities of Daily Living:         Therapeutic Activities and Exercises:  Patient completed the following for increased strength to increase I with ADLs: 3# dowel- shoulder press, chest press, bicep curls x 40, yellow theraflex x 40 both ways, yellow theraband- scapular retraction and tricep extension x 40; UBE 6 min      AM-PAC 6 CLICK ADL   How much help from another person does this patient currently need?   1 = Unable, Total/Dependent Assistance  2 = A lot, Maximum/Moderate Assistance  3 = A little, Minimum/Contact Guard/Supervision  4 = None, Modified [place]/Independent    Putting on and taking off regular lower body clothing? : 3  Bathing (including washing, rinsing, drying)?: 3  Toileting, which includes using toilet, bedpan, or urinal? : 4  Putting on and taking off regular upper body clothing?: 4  Taking care of personal grooming such as brushing teeth?: 4  Eating meals?: 4  Daily Activity Total Score: 22     AM-PAC Raw Score CMS "G-Code Modifier Level of Impairment Assistance   6 % Total / Unable   7 - 8 CM 80 - 100% Maximal Assist   9-13 CL 60 - 80% Moderate Assist   14 - 19 CK 40 - 60% Moderate Assist   20 - 22 CJ 20 - 40% Minimal Assist   23 CI 1-20% SBA / CGA   24 CH 0% Independent/ Mod I       Patient " left up in chair with call button in reach    ASSESSMENT:  Jonna Cunha is a 67 y.o. female with a medical diagnosis of S/P total knee arthroplasty, left and presents with decreased strength, decreased endurance, decreased self-care, decreased mobility.    Rehab identified problem list/impairments: Rehab identified problem list/impairments: weakness, impaired endurance, impaired self care skills, impaired functional mobility, impaired balance, decreased lower extremity function, decreased safety awareness, pain    Rehab potential is excellent.    Activity tolerance: Excellent    Discharge recommendations: Discharge Facility/Level of Care Needs: outpatient PT     Barriers to discharge:      Equipment recommendations: walker, rolling     GOALS:   Multidisciplinary Problems       Occupational Therapy Goals          Problem: Occupational Therapy    Goal Priority Disciplines Outcome Interventions   Occupational Therapy Goal     OT, PT/OT Ongoing, Progressing    Description: Description: Grooming Status:   Short Term Goal: Pt will perform grooming with s/u sitting EOB.   Long Term Goal: Pt will perform grooming/oral hygiene standing at sink with Mod I      LE dressing Status:   Short Term Goal: Pt will perform LE dressing with mod a.   Long Term Goal: Pt will perform LE dressing with min a.    Toileting Status:   Short Term Goal: Pt will perform toilet hygiene on BSC with s/u.  Long Term Goal: Pt will perform toilet hygiene on toilet with no AE with Mod I.    Commode Transfer:   Short Term Goal: Pt will perform BSC t/f with s/u.  Long Term Goal:  Pt will perform toilet t/f in bathroom with Mod I.     Bathing Status:   Long Term Goal: Pt will perform sponge bath with s/u with no unsafe fatigue.     Strength Status:   Long Term Goal: Pt to perform BUE strengthening with weights and/or body weight to increase ADL independence and safety    Endurance Status:   Short Term Goal:pt to perform 15 min OT treatment with 5 or  greater rest breaks  Long Term Goal: pt to perform 30 min OT treat with 3 or less rest breaks                         Plan:  Patient to be seen 5 x/week to address the above listed problems via therapeutic exercises  Plan of Care expires:    Plan of Care reviewed with: patient       Negra ZhangKenzie, OTR/L    09/22/2022

## 2022-09-22 NOTE — CONSULTS
"  [facility]  Adult Nutrition  Consult Note    SUMMARY     Recommendations    Recommendation/Intervention: 1. ENC intake, request snacks and prefs  Goals: Meet EEN >75%  Nutrition Goal Status: new    Assessment and Plan    No new Assessment & Plan notes have been filed under this hospital service since the last note was generated.  Service: Nutrition       Malnutrition Assessment  Malnutrition Type:  (Based on assessment this pt is not malnourished.)                                    Reason for Assessment    Reason For Assessment: consult (swing bed patient)  Diagnosis:  (s/p L TKR)  Relevant Medical History: HTN, DM, DJD  Interdisciplinary Rounds: did not attend  General Information Comments: RDN visited pt this a.m.  She has had some decreased intake R/T pain, meds (Ozempic), recent surgery.  RDN enc intake and voicing prefs for snacks, meals as needed.  Meal intake ~50%.  B-201.  She hada hypoglycemic episode x 1 since admit.    Nutrition Risk Screen    Nutrition Risk Screen: no indicators present    Nutrition/Diet History    Patient Reported Diet/Restrictions/Preferences: diabetic diet  Food Preferences: none stated  Spiritual, Cultural Beliefs, Caodaism Practices, Values that Affect Care: no  Food Allergies: NKFA  Factors Affecting Nutritional Intake: decreased appetite, pain    Anthropometrics    Temp: 97.9 °F (36.6 °C)  Height: 5' 5" (165.1 cm)  Height (inches): 65 in  Weight Method: Standard Scale  Weight: 85 kg (187 lb 6.3 oz)  Weight (lb): 187.39 lb  Ideal Body Weight (IBW), Female: 125 lb  % Ideal Body Weight, Female (lb): 149.91 %  BMI (Calculated): 31.2  BMI Grade: 30 - 34.9- obesity - grade I       Lab/Procedures/Meds    Pertinent Labs Reviewed: reviewed  Pertinent Labs Comments: BUN 19  Pertinent Medications Reviewed: reviewed  Pertinent Medications Comments: Insulin, Ozempic    Physical Findings/Assessment         Estimated/Assessed Needs    Weight Used For " Calorie Calculations: 64.4 kg (142 lb)  Energy Calorie Requirements (kcal): 7928-9063  Energy Need Method: Kcal/kg  Protein Requirements: 64-77  Weight Used For Protein Calculations: 64 kg (141 lb 1.5 oz)     Estimated Fluid Requirement Method:  (7217-9949)  RDA Method (mL): 1610         Nutrition Prescription Ordered    Current Diet Order: CON CHO    Evaluation of Received Nutrient/Fluid Intake    Energy Calories Required: not meeting needs  Protein Required: not meeting needs  Fluid Required: meeting needs  Tolerance: tolerating  % Intake of Estimated Energy Needs: 25 - 50 %  % Meal Intake: 25 - 50 %    Nutrition Risk    Level of Risk/Frequency of Follow-up: moderate       Monitor and Evaluation    Food and Nutrient Intake: food and beverage intake  Anthropometric Measurements: weight  Biochemical Data, Medical Tests and Procedures: electrolyte and renal panel  Nutrition-Focused Physical Findings: overall appearance, skin       Nutrition Follow-Up    RD Follow-up?: Yes

## 2022-09-22 NOTE — PLAN OF CARE
Problem: Adult Inpatient Plan of Care  Goal: Plan of Care Review  Outcome: Ongoing, Progressing  Flowsheets (Taken 9/22/2022 1517)  Plan of Care Reviewed With: patient  Goal: Patient-Specific Goal (Individualized)  Outcome: Ongoing, Progressing     Problem: Diabetes Comorbidity  Goal: Blood Glucose Level Within Targeted Range  Outcome: Ongoing, Progressing  Intervention: Monitor and Manage Glycemia  Flowsheets (Taken 9/22/2022 1517)  Glycemic Management:   blood glucose monitored   oral hydration promoted     Problem: Infection  Goal: Absence of Infection Signs and Symptoms  Outcome: Ongoing, Progressing  Intervention: Prevent or Manage Infection  Flowsheets (Taken 9/22/2022 1517)  Fever Reduction/Comfort Measures: fluid intake increased  Infection Management: aseptic technique maintained

## 2022-09-22 NOTE — PT/OT/SLP PROGRESS
"Physical Therapy  Treatment    Jonna Cunha   MRN: [id number]   Admitting Diagnosis: S/P total knee arthroplasty, left    PT Received On: 09/22/22  PT Start Time: 820  PT Stop Time: 0859    PT Total Time (min): 39 min       Billable Minutes:  Gait 9 minutes, Ther Ex 18, Ther Act 12       Treatment Type: Treatment  PT/PTA: PT     PTA visit number: 1     General Precautions: Standard, fall  Orthopedic Precautions: LLE weight bearing as tolerated   Braces:    Respiratory Status: Room air    Spiritual, Cultural Beliefs, Mu-ism Practices, Values that Affect Care: no    Subjective:  Patient reports 8/10 pain in L knee, but is agreeable to PT treatment session.     Pain/Comfort  Pain Rating 1: 8/10  Location - Side 1: Left  Location - Orientation 1: generalized  Location 1: knee  Pain Addressed 1: Other (see comments) (Nurse to administer pain medication with morning medications)    Objective:        Functional Mobility:  Bed Mobility: Sit to supine: min A x 1 for management of L LE onto bed       Transfers: Sit<>stand to RW SBA     Gait: Approx. 160' x 1 with RW and CGA on level indoor surface with cues to progress to step through pattern and staying closer to AD during turn    Stairs: Not Completed      Balance:   Static Sit: NORMAL: No deviations seen in posture held statically  Dynamic Sit: NORMAL: No deviations seen in posture held dynamically  Static Stand: FAIR: Maintains without assist but unable to take challenges  Dynamic stand: FAIR: Needs CONTACT GUARD during gait       Therapeutic Activities and Exercises:     TherEx Reps   NuStep  10 minutes    Quad Sets    Glute Sets    Ankle pumps    LAQ 20 x    Heel Slides 30 x (98 degrees)    Seated ADD 30 x 3"    SLR    Hip ABD  30 x yellow TB    Seated Marches    Hamstring Curls 20 x yellow TB                TherAct    Heel Raises 2 x 10    Mini Squats 10 x    Sit<>Stands 2 x 10    Marching                 AM-PAC 6 CLICK MOBILITY  How much help from another person " does this patient currently need?   1 = Unable, Total/Dependent Assistance  2 = A lot, Maximum/Moderate Assistance  3 = A little, Minimum/Contact Guard/Supervision  4 = None, Modified [place]/Independent    Turning over in bed (including adjusting bedclothes, sheets and blankets)?: 3  Sitting down on and standing up from a chair with arms (e.g., wheelchair, bedside commode, etc.): 4  Moving from lying on back to sitting on the side of the bed?: 3  Moving to and from a bed to a chair (including a wheelchair)?: 4  Need to walk in hospital room?: 3  Climbing 3-5 steps with a railing?: 2  Basic Mobility Total Score: 19    AM-PAC Raw Score CMS G-Code Modifier Level of Impairment Assistance   6 % Total / Unable   7 - 9 CM 80 - 100% Maximal Assist   10 - 14 CL 60 - 80% Moderate Assist   15 - 19 CK 40 - 60% Moderate Assist   20 - 22 CJ 20 - 40% Minimal Assist   23 CI 1-20% SBA / CGA   24 CH 0% Independent/ Mod I         Cryocuff to Left knee with Left knee elevated greater than heart level at end of treatment session.     Assessment:  Jonna Cunha is a 67 y.o. female with a medical diagnosis of S/P total knee arthroplasty, left and presents with pain and impaired mobility. PT provided patient with proper setup on NuStep to increase L knee ROM and flexibility prior to exercises. PT provided verbal and visual cueing for proper form with exercises. Patient had no reports of increased pain during treatment session.     Rehab identified problem list/impairments:     Rehab potential is good.    Activity tolerance: Good    Discharge recommendations: Discharge Facility/Level of Care Needs: outpatient PT     Barriers to discharge:      Equipment recommendations: Equipment Needed After Discharge: walker, rolling     GOALS:   Multidisciplinary Problems       Physical Therapy Goals          Problem: Physical Therapy    Goal Priority Disciplines Outcome Goal Variances Interventions   Physical Therapy Goal     PT, PT/OT  Ongoing, Progressing     Description: Short Term Goals  1. Patient will complete 30 reps of B LE exercises with correct form.   2. Patient will complete sit<>stand transfers with SBA.  3. Patient will ambulate 100 feet with RW on level surfaces with CGA.     Long Term Goals   1. Patient will ambulate 300 feet with RW on level and unlevel surfaces with SBA.  2. Patient will complete all functional transfers with MOD I.  3. L knee AROM 0-110.   4. Patient will negotiate up and down 5 stairs with use of handrail with SBA.                          PLAN:    Patient to be seen BID  to address the above listed problems via gait training, therapeutic activities, therapeutic exercises  Plan of Care expires: 10/06/22  Plan of Care reviewed with: patient    Continue POC Per PT order to progress patient toward rehab goals as tolerated by patient.    Nelda Silva, PT, DPT       9/22/2022

## 2022-09-22 NOTE — PT/OT/SLP PROGRESS
"Physical Therapy  Treatment    Jonna Cunha   MRN: [id number]   Admitting Diagnosis: S/P total knee arthroplasty, left    PT Received On: 09/22/22  PT Start Time: 1423  PT Stop Time: 1510    PT Total Time (min): 37 min       Billable Minutes: Ther Ex 10, Ther Act 27      Treatment Type: Treatment  PT/PTA: PTA     PTA visit number: 2     General Precautions: Standard, fall  Orthopedic Precautions: LLE weight bearing as tolerated   Braces:    Respiratory Status: Room air    Spiritual, Cultural Beliefs, Mandaen Practices, Values that Affect Care: no    Subjective:  Patient reports decreased pain in left knee and is ready for PT treatment.     Pain/Comfort  Pain Rating 1: 3/10  Location - Side 1: Left  Location 1: knee  Pain Addressed 1: Distraction    Objective:   Patient found with: cryotherapy, peripheral IV    Functional Mobility:  Bed Mobility: Supine<>sit with independence       Transfers: Sit<>stand to RW SBA     Gait: Approx. 160' x 2 with RW and CGA on level indoor surface     Stairs: Not Completed      Balance:   Static Sit: NORMAL: No deviations seen in posture held statically  Dynamic Sit: NORMAL: No deviations seen in posture held dynamically  Static Stand: FAIR: Maintains without assist but unable to take challenges  Dynamic stand: FAIR: Needs CONTACT GUARD during gait       Therapeutic Activities and Exercises: See flowsheet below: Only completed Quad sets, glute sets, and ankle pumps of TherEx. Increased repetitions for heel raises and mini squats.  Added marching, 3 way hip and FSU/LSU     TherEx Reps   NuStep  10 minutes    Quad Sets    Glute Sets    Ankle pumps    LAQ 20 x    Heel Slides 30 x (98 degrees)    Seated ADD 30 x 3"    SLR    Hip ABD  30 x yellow TB    Seated Marches    Hamstring Curls 20 x yellow TB                TherAct    Heel Raises 3 x 10    Mini Squats 2 x 10   Sit<>Stands 2 x 10    Marching  30x   3 way hip  3 x 10 each   FSU/LSU 15x each   Stair Ambulation 5x       AM-PAC 6 CLICK " MOBILITY  How much help from another person does this patient currently need?   1 = Unable, Total/Dependent Assistance  2 = A lot, Maximum/Moderate Assistance  3 = A little, Minimum/Contact Guard/Supervision  4 = None, Modified [place]/Independent    Turning over in bed (including adjusting bedclothes, sheets and blankets)?: 3  Sitting down on and standing up from a chair with arms (e.g., wheelchair, bedside commode, etc.): 4  Moving from lying on back to sitting on the side of the bed?: 3  Moving to and from a bed to a chair (including a wheelchair)?: 4  Need to walk in hospital room?: 3  Climbing 3-5 steps with a railing?: 2  Basic Mobility Total Score: 19    AM-PAC Raw Score CMS G-Code Modifier Level of Impairment Assistance   6 % Total / Unable   7 - 9 CM 80 - 100% Maximal Assist   10 - 14 CL 60 - 80% Moderate Assist   15 - 19 CK 40 - 60% Moderate Assist   20 - 22 CJ 20 - 40% Minimal Assist   23 CI 1-20% SBA / CGA   24 CH 0% Independent/ Mod I              Assessment:  Jonna Cunha is a 67 y.o. female with a medical diagnosis of S/P total knee arthroplasty, left and presents with pain and impaired mobility. Pt completed TherEx with minimal verbal cueing needed.  LPTA prompted pt with verbal cues to avoid externally rotating LLE during standing abduction of 3 way hip. Pt displayed some signs of fatigue during TherAct due to low endurance  Pt educated on the importance of RICE to decrease left knee swelling. Pt completed tx without any adverse effects.  Upon completion of tx session, pt returned to her room and left in bed with cryocuff donned onto LLE and call bell handy.    Rehab identified problem list/impairments:     Rehab potential is good.    Activity tolerance: Good    Discharge recommendations: Discharge Facility/Level of Care Needs: outpatient PT     Barriers to discharge:      Equipment recommendations: Equipment Needed After Discharge: walker, rolling     GOALS:   Multidisciplinary  Problems       Physical Therapy Goals          Problem: Physical Therapy    Goal Priority Disciplines Outcome Goal Variances Interventions   Physical Therapy Goal     PT, PT/OT Ongoing, Progressing     Description: Short Term Goals  1. Patient will complete 30 reps of B LE exercises with correct form.   2. Patient will complete sit<>stand transfers with SBA.  3. Patient will ambulate 100 feet with RW on level surfaces with CGA.     Long Term Goals   1. Patient will ambulate 300 feet with RW on level and unlevel surfaces with SBA.  2. Patient will complete all functional transfers with MOD I.  3. L knee AROM 0-110.   4. Patient will negotiate up and down 5 stairs with use of handrail with SBA.                          PLAN:    Patient to be seen BID  to address the above listed problems via gait training, therapeutic activities, therapeutic exercises  Plan of Care expires: 10/06/22  Plan of Care reviewed with: patient    Continue POC Per PT order to progress patient toward rehab goals as tolerated by patient.    DANNIE Cruz      9/22/2022

## 2022-09-23 LAB
GLUCOSE SERPL-MCNC: 108 MG/DL (ref 70–105)
GLUCOSE SERPL-MCNC: 228 MG/DL (ref 70–105)

## 2022-09-23 PROCEDURE — 99308 SBSQ NF CARE LOW MDM 20: CPT | Mod: ,,, | Performed by: FAMILY MEDICINE

## 2022-09-23 PROCEDURE — 97110 THERAPEUTIC EXERCISES: CPT | Mod: CQ

## 2022-09-23 PROCEDURE — 97110 THERAPEUTIC EXERCISES: CPT

## 2022-09-23 PROCEDURE — 25000003 PHARM REV CODE 250: Performed by: INTERNAL MEDICINE

## 2022-09-23 PROCEDURE — 99900035 HC TECH TIME PER 15 MIN (STAT)

## 2022-09-23 PROCEDURE — 82962 GLUCOSE BLOOD TEST: CPT

## 2022-09-23 PROCEDURE — 97116 GAIT TRAINING THERAPY: CPT | Mod: CQ

## 2022-09-23 PROCEDURE — 94761 N-INVAS EAR/PLS OXIMETRY MLT: CPT

## 2022-09-23 PROCEDURE — 25000003 PHARM REV CODE 250: Performed by: FAMILY MEDICINE

## 2022-09-23 PROCEDURE — 63600175 PHARM REV CODE 636 W HCPCS: Performed by: INTERNAL MEDICINE

## 2022-09-23 PROCEDURE — 97530 THERAPEUTIC ACTIVITIES: CPT | Mod: CQ

## 2022-09-23 PROCEDURE — 99308 PR NURSING FAC CARE, SUBSEQ, MINOR COMPLIC: ICD-10-PCS | Mod: ,,, | Performed by: FAMILY MEDICINE

## 2022-09-23 PROCEDURE — 11000004 HC SNF PRIVATE

## 2022-09-23 RX ADMIN — CELECOXIB 200 MG: 100 CAPSULE ORAL at 08:09

## 2022-09-23 RX ADMIN — FAMOTIDINE 20 MG: 20 TABLET, FILM COATED ORAL at 08:09

## 2022-09-23 RX ADMIN — METOPROLOL TARTRATE 25 MG: 25 TABLET, FILM COATED ORAL at 08:09

## 2022-09-23 RX ADMIN — PREGABALIN 75 MG: 75 CAPSULE ORAL at 08:09

## 2022-09-23 RX ADMIN — SENNOSIDES AND DOCUSATE SODIUM 1 TABLET: 50; 8.6 TABLET ORAL at 08:09

## 2022-09-23 RX ADMIN — LOSARTAN POTASSIUM 100 MG: 100 TABLET, FILM COATED ORAL at 08:09

## 2022-09-23 RX ADMIN — ZOLPIDEM TARTRATE 5 MG: 5 TABLET, COATED ORAL at 08:09

## 2022-09-23 RX ADMIN — INSULIN DETEMIR 62 UNITS: 100 INJECTION, SOLUTION SUBCUTANEOUS at 08:09

## 2022-09-23 RX ADMIN — OXYCODONE HYDROCHLORIDE AND ACETAMINOPHEN 1 TABLET: 10; 325 TABLET ORAL at 01:09

## 2022-09-23 RX ADMIN — AMLODIPINE BESYLATE 5 MG: 2.5 TABLET ORAL at 08:09

## 2022-09-23 RX ADMIN — ASPIRIN 325 MG ORAL TABLET 325 MG: 325 PILL ORAL at 08:09

## 2022-09-23 RX ADMIN — OXYCODONE HYDROCHLORIDE AND ACETAMINOPHEN 1 TABLET: 10; 325 TABLET ORAL at 08:09

## 2022-09-23 NOTE — PROGRESS NOTES
[facility]  Hospital Medicine  Progress Note    Patient Name: Jonna Cunha  MRN: [id number]  Patient Class: IP- Swing   Admission Date: 9/15/2022  Length of Stay: 8 days  Attending Physician: Abimbola Fischer, *  Primary Care Provider: Abimbola Fischer MD        Subjective:     Principal Problem:S/P total knee arthroplasty, left        HPI:  No notes on file    Overview/Hospital Course:  9/16/22 - pt. Staying in pain. Was not on narcotics before surgery.    9/19/22 - feels tired today. Had a hypoglycemic episode this weekend. Will continue present treatment.    9/23/22 - doing well. Voices no complaints.      Interval History: voices no complaints.    Review of Systems  Objective:     Vital Signs (Most Recent):  Temp: 98.2 °F (36.8 °C) (09/23/22 0812)  Pulse: 61 (09/23/22 0812)  Resp: 20 (09/23/22 0812)  BP: (!) 143/80 (09/23/22 0812)  SpO2: 98 % (09/23/22 0812)   Vital Signs (24h Range):  Temp:  [98 °F (36.7 °C)-98.2 °F (36.8 °C)] 98.2 °F (36.8 °C)  Pulse:  [61-71] 61  Resp:  [18-20] 20  SpO2:  [96 %-98 %] 98 %  BP: (143-149)/(65-80) 143/80     Weight: 85 kg (187 lb 6.3 oz)  Body mass index is 31.18 kg/m².    Intake/Output Summary (Last 24 hours) at 9/23/2022 0836  Last data filed at 9/23/2022 0813  Gross per 24 hour   Intake 600 ml   Output 400 ml   Net 200 ml      Physical Exam    Significant Labs: All pertinent labs within the past 24 hours have been reviewed.    Significant Imaging: I have reviewed all pertinent imaging results/findings within the past 24 hours.      Assessment/Plan:      Acute meniscal tear of left knee          VTE Risk Mitigation (From admission, onward)         Ordered     IP VTE HIGH RISK PATIENT  Once         09/15/22 1613     Place SELINA hose  Until discontinued         09/15/22 1613                Discharge Planning   DOTTIE: 10/5/2022     Code Status: Full Code   Is the patient medically ready for discharge?:     Reason for patient still in  hospital (select all that apply): Patient trending condition  Discharge Plan A: Home with family                  Abimbola Fischer MD  Department of Hospital Medicine   [facility]

## 2022-09-23 NOTE — PLAN OF CARE
Problem: Adult Inpatient Plan of Care  Goal: Plan of Care Review  Outcome: Ongoing, Progressing  Goal: Patient-Specific Goal (Individualized)  Outcome: Ongoing, Progressing     Problem: Fall Injury Risk  Goal: Absence of Fall and Fall-Related Injury  Outcome: Ongoing, Progressing  Intervention: Identify and Manage Contributors  Flowsheets (Taken 9/23/2022 1618)  Self-Care Promotion:   independence encouraged   BADL personal objects within reach   BADL personal routines maintained  Medication Review/Management:   medications reviewed   high-risk medications identified  Intervention: Promote Injury-Free Environment  Flowsheets (Taken 9/23/2022 1618)  Safety Promotion/Fall Prevention:   assistive device/personal item within reach   crib side rails raised x2   diversional activities provided   Fall Risk reviewed with patient/family   Fall Risk signage in place   high risk medications identified   side rails raised x 3

## 2022-09-23 NOTE — PT/OT/SLP PROGRESS
"Physical Therapy  Treatment    Jonna Cunha   MRN: [id number]   Admitting Diagnosis: S/P total knee arthroplasty, left    PT Received On: 09/23/22  PT Start Time: 820  PT Stop Time: 1330    PT Total Time (min): 1434 min       Billable Minutes:  Gait 12, Ther Ex 30       Treatment Type: Treatment  PT/PTA: PTA     PTA visit number: 1     General Precautions: Standard, fall  Orthopedic Precautions: LLE weight bearing as tolerated   Braces: Knee immobilizer  Respiratory Status: Room air    Spiritual, Cultural Beliefs, Druze Practices, Values that Affect Care: no    Subjective:  Patient states "My knee is hurting, but I've been trying to hold out until later this afternoon to take a pain pill ".     Pain/Comfort  Pain Rating 1: 4/10  Location - Side 1: Left  Location - Orientation 1: generalized  Location 1: knee  Pain Addressed 1: Distraction    Objective:        Functional Mobility:  Bed Mobility: Sit to supine: min A x 1 for management of L LE onto bed       Transfers: Sit<>stand to RW SBA     Gait: Approx. 100' with RW and CGA on level indoor surface with cues to progress to step through pattern and staying closer to AD during turn    Stairs: Not Completed      Balance:   Static Sit: NORMAL: No deviations seen in posture held statically  Dynamic Sit: NORMAL: No deviations seen in posture held dynamically  Static Stand: FAIR: Maintains without assist but unable to take challenges  Dynamic stand: FAIR: Needs CONTACT GUARD during gait       Therapeutic Activities and Exercises:     TherEx Reps   NuStep  10 minutes    Quad Sets    Glute Sets    Ankle pumps 30 x    LAQ 3 x 10, 3"    Heel Slides 30 x (100 degrees)    Seated ADD 30 x 3"    SAQ's    Hip ABD     Seated Marches    Hamstring Curls                TherAct    Heel Raises 2 x 10    Mini Squats 2 x 10    Sit<>Stands 10 x    Marching  2 x 10   Standing Hamstring Curl  2 x 10    3 way hip  2 x 10        AM-PAC 6 CLICK MOBILITY  How much help from another person " does this patient currently need?   1 = Unable, Total/Dependent Assistance  2 = A lot, Maximum/Moderate Assistance  3 = A little, Minimum/Contact Guard/Supervision  4 = None, Modified [place]/Independent    Turning over in bed (including adjusting bedclothes, sheets and blankets)?: 3  Sitting down on and standing up from a chair with arms (e.g., wheelchair, bedside commode, etc.): 4  Moving from lying on back to sitting on the side of the bed?: 3  Moving to and from a bed to a chair (including a wheelchair)?: 4  Need to walk in hospital room?: 3  Climbing 3-5 steps with a railing?: 2  Basic Mobility Total Score: 19    AM-PAC Raw Score CMS G-Code Modifier Level of Impairment Assistance   6 % Total / Unable   7 - 9 CM 80 - 100% Maximal Assist   10 - 14 CL 60 - 80% Moderate Assist   15 - 19 CK 40 - 60% Moderate Assist   20 - 22 CJ 20 - 40% Minimal Assist   23 CI 1-20% SBA / CGA   24 CH 0% Independent/ Mod I         Patient was returned to room at end of treatment session.  Patient left seated with call bell within reach.     Assessment:  Jonna Cunha is a 67 y.o. female with a medical diagnosis of S/P total knee arthroplasty, left and presents with pain and impaired mobility. LPTA provided patient with proper setup on NuStep to increase L knee ROM and flexibility prior to exercises. LPTA provided mod cues x 3 with > 50% of standing Ther Act to decrease trunk leaning and compensation.  AROM L knee flexion not changed from am session.  Patient reports mod fatigue during treatment session, and requires multiple rest breaks.  No adverse effects noted or reported.     Rehab identified problem list/impairments:     Rehab potential is good.    Activity tolerance: Good    Discharge recommendations: Discharge Facility/Level of Care Needs: outpatient OT     Barriers to discharge:      Equipment recommendations: Equipment Needed After Discharge: walker, rolling     GOALS:   Multidisciplinary Problems       Physical  Therapy Goals          Problem: Physical Therapy    Goal Priority Disciplines Outcome Goal Variances Interventions   Physical Therapy Goal     PT, PT/OT Ongoing, Progressing     Description: Short Term Goals  1. Patient will complete 30 reps of B LE exercises with correct form.   2. Patient will complete sit<>stand transfers with SBA.  3. Patient will ambulate 100 feet with RW on level surfaces with CGA.     Long Term Goals   1. Patient will ambulate 300 feet with RW on level and unlevel surfaces with SBA.  2. Patient will complete all functional transfers with MOD I.  3. L knee AROM 0-110.   4. Patient will negotiate up and down 5 stairs with use of handrail with SBA.                          PLAN:    Patient to be seen BID  to address the above listed problems via gait training, therapeutic activities, therapeutic exercises  Plan of Care expires: 10/06/22  Plan of Care reviewed with: patient    Continue POC Per PT order to progress patient toward rehab goals as tolerated by patient.    DANNIE Angel       9/23/2022

## 2022-09-23 NOTE — PT/OT/SLP PROGRESS
"Physical Therapy  Treatment    Jonna Cunha   MRN: [id number]   Admitting Diagnosis: S/P total knee arthroplasty, left    PT Received On: 09/23/22  PT Start Time: 820  PT Stop Time: 1042    PT Total Time (min): 42 min       Billable Minutes:  Gait 12, Ther Ex 30       Treatment Type: Treatment  PT/PTA: PTA     PTA visit number: 1     General Precautions: Standard, fall  Orthopedic Precautions: LLE weight bearing as tolerated   Braces:    Respiratory Status: Room air    Spiritual, Cultural Beliefs, Jain Practices, Values that Affect Care: no    Subjective:  Patient states "My knee is sore today, but it's okay".     Pain/Comfort  Pain Rating 1: 4/10  Location - Side 1: Left  Location - Orientation 1: generalized  Location 1: knee  Pain Addressed 1: Distraction    Objective:        Functional Mobility:  Bed Mobility: Sit to supine: min A x 1 for management of L LE onto bed       Transfers: Sit<>stand to RW SBA     Gait: Approx. 200' + 110'  with RW and CGA on level indoor surface with cues to progress to step through pattern and staying closer to AD during turn    Stairs: Not Completed      Balance:   Static Sit: NORMAL: No deviations seen in posture held statically  Dynamic Sit: NORMAL: No deviations seen in posture held dynamically  Static Stand: FAIR: Maintains without assist but unable to take challenges  Dynamic stand: FAIR: Needs CONTACT GUARD during gait       Therapeutic Activities and Exercises:     TherEx Reps   NuStep  10 minutes    Quad Sets 30 x 5" *    Glute Sets    Ankle pumps 30 x    LAQ 3 x 10, 3"    Heel Slides 30 x (100 degrees)    Seated ADD 30 x 3"    SAQ's 3 x 10, 3"    Hip ABD     Seated Marches    Hamstring Curls                TherAct    Heel Raises    Mini Squats    Sit<>Stands    Marching                 AM-[facility] 6 CLICK MOBILITY  How much help from another person does this patient currently need?   1 = Unable, Total/Dependent Assistance  2 = A lot, Maximum/Moderate Assistance  3 = A " "little, Minimum/Contact Guard/Supervision  4 = None, Modified [place]/Independent    Turning over in bed (including adjusting bedclothes, sheets and blankets)?: 3  Sitting down on and standing up from a chair with arms (e.g., wheelchair, bedside commode, etc.): 4  Moving from lying on back to sitting on the side of the bed?: 3  Moving to and from a bed to a chair (including a wheelchair)?: 4  Need to walk in hospital room?: 3  Climbing 3-5 steps with a railing?: 2 (Not attempted this treatment)  Basic Mobility Total Score: 19    AM-PAC Raw Score CMS G-Code Modifier Level of Impairment Assistance   6 % Total / Unable   7 - 9 CM 80 - 100% Maximal Assist   10 - 14 CL 60 - 80% Moderate Assist   15 - 19 CK 40 - 60% Moderate Assist   20 - 22 CJ 20 - 40% Minimal Assist   23 CI 1-20% SBA / CGA   24 CH 0% Independent/ Mod I         Patient was returned to room at end of treatment session.  Patient left seated with call bell within reach.     Assessment:  Jonna Cunha is a 67 y.o. female with a medical diagnosis of S/P total knee arthroplasty, left and presents with pain and impaired mobility. LPTA provided patient with proper setup on NuStep to increase L knee ROM and flexibility prior to exercises. LPTA provided verbal and visual cueing for proper form with exercises.  Patient initially reports feeling "tightness" and "stiffness" in L knee when starting treatment session, but by end of treatment, patient states "My knee actually feels better now".  No adverse effects noted during treatment.     Rehab identified problem list/impairments:     Rehab potential is good.    Activity tolerance: Good    Discharge recommendations: Discharge Facility/Level of Care Needs: outpatient PT     Barriers to discharge:      Equipment recommendations: Equipment Needed After Discharge: walker, rolling     GOALS:   Multidisciplinary Problems       Physical Therapy Goals          Problem: Physical Therapy    Goal Priority " Disciplines Outcome Goal Variances Interventions   Physical Therapy Goal     PT, PT/OT Ongoing, Progressing     Description: Short Term Goals  1. Patient will complete 30 reps of B LE exercises with correct form.   2. Patient will complete sit<>stand transfers with SBA.  3. Patient will ambulate 100 feet with RW on level surfaces with CGA.     Long Term Goals   1. Patient will ambulate 300 feet with RW on level and unlevel surfaces with SBA.  2. Patient will complete all functional transfers with MOD I.  3. L knee AROM 0-110.   4. Patient will negotiate up and down 5 stairs with use of handrail with SBA.                          PLAN:    Patient to be seen BID  to address the above listed problems via gait training, therapeutic activities, therapeutic exercises  Plan of Care expires: 10/06/22  Plan of Care reviewed with: patient    Continue POC Per PT order to progress patient toward rehab goals as tolerated by patient.    DANNIE Angel       9/23/2022

## 2022-09-23 NOTE — SUBJECTIVE & OBJECTIVE
Interval History: voices no complaints.    Review of Systems  Objective:     Vital Signs (Most Recent):  Temp: 98.2 °F (36.8 °C) (09/23/22 0812)  Pulse: 61 (09/23/22 0812)  Resp: 20 (09/23/22 0812)  BP: (!) 143/80 (09/23/22 0812)  SpO2: 98 % (09/23/22 0812)   Vital Signs (24h Range):  Temp:  [98 °F (36.7 °C)-98.2 °F (36.8 °C)] 98.2 °F (36.8 °C)  Pulse:  [61-71] 61  Resp:  [18-20] 20  SpO2:  [96 %-98 %] 98 %  BP: (143-149)/(65-80) 143/80     Weight: 85 kg (187 lb 6.3 oz)  Body mass index is 31.18 kg/m².    Intake/Output Summary (Last 24 hours) at 9/23/2022 0836  Last data filed at 9/23/2022 0813  Gross per 24 hour   Intake 600 ml   Output 400 ml   Net 200 ml      Physical Exam    Significant Labs: All pertinent labs within the past 24 hours have been reviewed.    Significant Imaging: I have reviewed all pertinent imaging results/findings within the past 24 hours.

## 2022-09-23 NOTE — PT/OT/SLP PROGRESS
"Occupational Therapy  Treatment    Jonna Cunha   MRN: [id number]   Admitting Diagnosis: S/P total knee arthroplasty, left    OT Date of Treatment: 09/23/22   OT Start Time: 1205  OT Stop Time: 1232  OT Total Time (min): 27 min    Billable Minutes:  Therapeutic Exercise 27 min               General Precautions: Standard, fall  Orthopedic Precautions: LLE weight bearing as tolerated  Braces:    Respiratory Status: Room air         Subjective:  Communicated with RN prior to session.    Pain/Comfort  Pain Rating 1: 4/10    Objective:        Functional Mobility:  Bed Mobility:       Transfers:        Functional Ambulation: Mod I with RW from room to therapy    Activities of Daily Living:         Therapeutic Activities and Exercises:  Patient completed the following for increased strength to increase I with ADLs: 3# dowel- shoulder press, chest press, bicep curls x 40, yellow theraflex x 40 both ways, yellow theraband- scapular retraction and tricep extension x 40; UBE 6 min      AM-PAC 6 CLICK ADL   How much help from another person does this patient currently need?   1 = Unable, Total/Dependent Assistance  2 = A lot, Maximum/Moderate Assistance  3 = A little, Minimum/Contact Guard/Supervision  4 = None, Modified [place]/Independent    Putting on and taking off regular lower body clothing? : 3  Bathing (including washing, rinsing, drying)?: 3  Toileting, which includes using toilet, bedpan, or urinal? : 4  Putting on and taking off regular upper body clothing?: 4  Taking care of personal grooming such as brushing teeth?: 4  Eating meals?: 4  Daily Activity Total Score: 22     AM-PAC Raw Score CMS "G-Code Modifier Level of Impairment Assistance   6 % Total / Unable   7 - 8 CM 80 - 100% Maximal Assist   9-13 CL 60 - 80% Moderate Assist   14 - 19 CK 40 - 60% Moderate Assist   20 - 22 CJ 20 - 40% Minimal Assist   23 CI 1-20% SBA / CGA   24 CH 0% Independent/ Mod I       Patient left up in chair with call button " in reach    ASSESSMENT:  Jonna Cunha is a 67 y.o. female with a medical diagnosis of S/P total knee arthroplasty, left and presents with decreased strength, decreased endurance, decreased self-care, decreased mobility.    Rehab identified problem list/impairments: Rehab identified problem list/impairments: weakness, impaired endurance, impaired self care skills, impaired functional mobility, impaired balance, decreased lower extremity function, decreased safety awareness    Rehab potential is excellent.    Activity tolerance: Excellent    Discharge recommendations: Discharge Facility/Level of Care Needs: outpatient PT     Barriers to discharge:      Equipment recommendations: walker, rolling     GOALS:   Multidisciplinary Problems       Occupational Therapy Goals          Problem: Occupational Therapy    Goal Priority Disciplines Outcome Interventions   Occupational Therapy Goal     OT, PT/OT Ongoing, Progressing    Description: Description: Grooming Status:   Short Term Goal: Pt will perform grooming with s/u sitting EOB.   Long Term Goal: Pt will perform grooming/oral hygiene standing at sink with Mod I      LE dressing Status:   Short Term Goal: Pt will perform LE dressing with mod a.   Long Term Goal: Pt will perform LE dressing with min a.    Toileting Status:   Short Term Goal: Pt will perform toilet hygiene on BSC with s/u.  Long Term Goal: Pt will perform toilet hygiene on toilet with no AE with Mod I.    Commode Transfer:   Short Term Goal: Pt will perform BSC t/f with s/u.  Long Term Goal:  Pt will perform toilet t/f in bathroom with Mod I.     Bathing Status:   Long Term Goal: Pt will perform sponge bath with s/u with no unsafe fatigue.     Strength Status:   Long Term Goal: Pt to perform BUE strengthening with weights and/or body weight to increase ADL independence and safety    Endurance Status:   Short Term Goal:pt to perform 15 min OT treatment with 5 or greater rest breaks  Long Term Goal: pt to  perform 30 min OT treat with 3 or less rest breaks                         Plan:  Patient to be seen 5 x/week to address the above listed problems via therapeutic exercises  Plan of Care expires:    Plan of Care reviewed with: patient       Negra Rachael, OTR/L    09/23/2022

## 2022-09-24 LAB — GLUCOSE SERPL-MCNC: 181 MG/DL (ref 70–105)

## 2022-09-24 PROCEDURE — 11000004 HC SNF PRIVATE

## 2022-09-24 PROCEDURE — 25000003 PHARM REV CODE 250: Performed by: INTERNAL MEDICINE

## 2022-09-24 PROCEDURE — 99900035 HC TECH TIME PER 15 MIN (STAT)

## 2022-09-24 PROCEDURE — 63600175 PHARM REV CODE 636 W HCPCS: Performed by: INTERNAL MEDICINE

## 2022-09-24 PROCEDURE — 82962 GLUCOSE BLOOD TEST: CPT

## 2022-09-24 PROCEDURE — 25000003 PHARM REV CODE 250: Performed by: FAMILY MEDICINE

## 2022-09-24 RX ADMIN — INSULIN DETEMIR 62 UNITS: 100 INJECTION, SOLUTION SUBCUTANEOUS at 08:09

## 2022-09-24 RX ADMIN — OXYCODONE HYDROCHLORIDE AND ACETAMINOPHEN 1 TABLET: 10; 325 TABLET ORAL at 08:09

## 2022-09-24 RX ADMIN — SENNOSIDES AND DOCUSATE SODIUM 1 TABLET: 50; 8.6 TABLET ORAL at 08:09

## 2022-09-24 RX ADMIN — CELECOXIB 200 MG: 100 CAPSULE ORAL at 08:09

## 2022-09-24 RX ADMIN — FAMOTIDINE 20 MG: 20 TABLET, FILM COATED ORAL at 08:09

## 2022-09-24 RX ADMIN — ZOLPIDEM TARTRATE 5 MG: 5 TABLET, COATED ORAL at 08:09

## 2022-09-24 RX ADMIN — AMLODIPINE BESYLATE 5 MG: 2.5 TABLET ORAL at 08:09

## 2022-09-24 RX ADMIN — METOPROLOL TARTRATE 25 MG: 25 TABLET, FILM COATED ORAL at 08:09

## 2022-09-24 RX ADMIN — LOSARTAN POTASSIUM 100 MG: 100 TABLET, FILM COATED ORAL at 08:09

## 2022-09-24 RX ADMIN — ASPIRIN 325 MG ORAL TABLET 325 MG: 325 PILL ORAL at 08:09

## 2022-09-24 RX ADMIN — PREGABALIN 75 MG: 75 CAPSULE ORAL at 08:09

## 2022-09-24 NOTE — PLAN OF CARE
Problem: Adult Inpatient Plan of Care  Goal: Plan of Care Review  Outcome: Ongoing, Progressing  Goal: Optimal Comfort and Wellbeing  Outcome: Ongoing, Progressing  Goal: Readiness for Transition of Care  Outcome: Ongoing, Progressing     Problem: Diabetes Comorbidity  Goal: Blood Glucose Level Within Targeted Range  Outcome: Ongoing, Progressing     Problem: Fall Injury Risk  Goal: Absence of Fall and Fall-Related Injury  Outcome: Ongoing, Progressing

## 2022-09-25 LAB — GLUCOSE SERPL-MCNC: 206 MG/DL (ref 70–105)

## 2022-09-25 PROCEDURE — 63600175 PHARM REV CODE 636 W HCPCS: Performed by: INTERNAL MEDICINE

## 2022-09-25 PROCEDURE — 99900035 HC TECH TIME PER 15 MIN (STAT)

## 2022-09-25 PROCEDURE — 25000003 PHARM REV CODE 250: Performed by: INTERNAL MEDICINE

## 2022-09-25 PROCEDURE — 11000004 HC SNF PRIVATE

## 2022-09-25 PROCEDURE — 82962 GLUCOSE BLOOD TEST: CPT

## 2022-09-25 PROCEDURE — 25000003 PHARM REV CODE 250: Performed by: FAMILY MEDICINE

## 2022-09-25 PROCEDURE — 94761 N-INVAS EAR/PLS OXIMETRY MLT: CPT

## 2022-09-25 RX ADMIN — INSULIN DETEMIR 62 UNITS: 100 INJECTION, SOLUTION SUBCUTANEOUS at 08:09

## 2022-09-25 RX ADMIN — OXYCODONE HYDROCHLORIDE AND ACETAMINOPHEN 1 TABLET: 10; 325 TABLET ORAL at 02:09

## 2022-09-25 RX ADMIN — OXYCODONE HYDROCHLORIDE AND ACETAMINOPHEN 1 TABLET: 10; 325 TABLET ORAL at 08:09

## 2022-09-25 RX ADMIN — ASPIRIN 325 MG ORAL TABLET 325 MG: 325 PILL ORAL at 08:09

## 2022-09-25 RX ADMIN — PREGABALIN 75 MG: 75 CAPSULE ORAL at 08:09

## 2022-09-25 RX ADMIN — METOPROLOL TARTRATE 25 MG: 25 TABLET, FILM COATED ORAL at 08:09

## 2022-09-25 RX ADMIN — CELECOXIB 200 MG: 100 CAPSULE ORAL at 08:09

## 2022-09-25 RX ADMIN — LOSARTAN POTASSIUM 100 MG: 100 TABLET, FILM COATED ORAL at 08:09

## 2022-09-25 RX ADMIN — SENNOSIDES AND DOCUSATE SODIUM 1 TABLET: 50; 8.6 TABLET ORAL at 08:09

## 2022-09-25 RX ADMIN — FAMOTIDINE 20 MG: 20 TABLET, FILM COATED ORAL at 08:09

## 2022-09-25 RX ADMIN — ZOLPIDEM TARTRATE 5 MG: 5 TABLET, COATED ORAL at 08:09

## 2022-09-25 RX ADMIN — AMLODIPINE BESYLATE 5 MG: 2.5 TABLET ORAL at 08:09

## 2022-09-26 LAB — GLUCOSE SERPL-MCNC: 114 MG/DL (ref 70–105)

## 2022-09-26 PROCEDURE — 25000003 PHARM REV CODE 250: Performed by: INTERNAL MEDICINE

## 2022-09-26 PROCEDURE — 97116 GAIT TRAINING THERAPY: CPT

## 2022-09-26 PROCEDURE — 99900035 HC TECH TIME PER 15 MIN (STAT)

## 2022-09-26 PROCEDURE — 94761 N-INVAS EAR/PLS OXIMETRY MLT: CPT

## 2022-09-26 PROCEDURE — 97110 THERAPEUTIC EXERCISES: CPT

## 2022-09-26 PROCEDURE — 99308 SBSQ NF CARE LOW MDM 20: CPT | Mod: ,,, | Performed by: FAMILY MEDICINE

## 2022-09-26 PROCEDURE — 99308 PR NURSING FAC CARE, SUBSEQ, MINOR COMPLIC: ICD-10-PCS | Mod: ,,, | Performed by: FAMILY MEDICINE

## 2022-09-26 PROCEDURE — 82962 GLUCOSE BLOOD TEST: CPT

## 2022-09-26 PROCEDURE — 11000004 HC SNF PRIVATE

## 2022-09-26 PROCEDURE — 63600175 PHARM REV CODE 636 W HCPCS: Performed by: INTERNAL MEDICINE

## 2022-09-26 PROCEDURE — 25000003 PHARM REV CODE 250: Performed by: FAMILY MEDICINE

## 2022-09-26 RX ADMIN — METOPROLOL TARTRATE 25 MG: 25 TABLET, FILM COATED ORAL at 08:09

## 2022-09-26 RX ADMIN — ZOLPIDEM TARTRATE 5 MG: 5 TABLET, COATED ORAL at 08:09

## 2022-09-26 RX ADMIN — PREGABALIN 75 MG: 75 CAPSULE ORAL at 08:09

## 2022-09-26 RX ADMIN — SENNOSIDES AND DOCUSATE SODIUM 1 TABLET: 50; 8.6 TABLET ORAL at 08:09

## 2022-09-26 RX ADMIN — CELECOXIB 200 MG: 100 CAPSULE ORAL at 08:09

## 2022-09-26 RX ADMIN — OXYCODONE HYDROCHLORIDE AND ACETAMINOPHEN 1 TABLET: 10; 325 TABLET ORAL at 08:09

## 2022-09-26 RX ADMIN — ASPIRIN 325 MG ORAL TABLET 325 MG: 325 PILL ORAL at 08:09

## 2022-09-26 RX ADMIN — FAMOTIDINE 20 MG: 20 TABLET, FILM COATED ORAL at 08:09

## 2022-09-26 RX ADMIN — INSULIN DETEMIR 62 UNITS: 100 INJECTION, SOLUTION SUBCUTANEOUS at 08:09

## 2022-09-26 RX ADMIN — LOSARTAN POTASSIUM 100 MG: 100 TABLET, FILM COATED ORAL at 08:09

## 2022-09-26 RX ADMIN — OXYCODONE HYDROCHLORIDE AND ACETAMINOPHEN 1 TABLET: 10; 325 TABLET ORAL at 01:09

## 2022-09-26 RX ADMIN — AMLODIPINE BESYLATE 5 MG: 2.5 TABLET ORAL at 08:09

## 2022-09-26 NOTE — DISCHARGE SUMMARY
[facility]  Discharge Summary      Admit Date: 9/14/2022    Discharge Date and Time: 9/15/2022  1:30 PM    Attending Physician: No att. providers found     Reason for Admission:     Procedures Performed: Procedure(s) (LRB):  ARTHROPLASTY, KNEE, TOTAL, USING COMPUTER-ASSISTED NAVIGATION (Left)    Hospital Course (synopsis of major diagnoses, care, treatment, and services provided during the course of the hospital stay):   Jonna Cunha was admitted following the aforementioned surgical procedure.  she received perioperative antibiotics over 23 hours following the surgery and participated in post operative physical therapy.  she was made familiar with the post-operative rehabilitation process and the need for DVT prophylaxis was discussed prior to discharged.  Jonna Cunha was discharged safely having suffered no untoward events.        Goals of Care Treatment Preferences:  Code Status: Full Code        Significant Diagnostic Studies: Labs: BMP: No results for input(s): GLU, NA, K, CL, CO2, BUN, CREATININE, CALCIUM, MG in the last 48 hours. and CBC No results for input(s): WBC, HGB, HCT, PLT in the last 48 hours.    Final Diagnoses:    Principal Problem: Acute pain of left knee   Secondary Diagnoses:   Active Hospital Problems    Diagnosis  POA    *Acute pain of left knee [M25.562]  Yes    Diabetes [E11.9]  Yes    Essential hypertension [I10]  Yes    Arthritis [M19.90]  Yes      Resolved Hospital Problems   No resolved problems to display.       Discharged Condition: good    Disposition: Rehab Facility    Follow Up/Patient Instructions:     Medications:  Reconciled Home Medications:      Medication List        START taking these medications      celecoxib 200 MG capsule  Commonly known as: CeleBREX  Take 1 capsule (200 mg total) by mouth 2 (two) times daily.     ondansetron 4 MG Tbdl  Commonly known as: ZOFRAN-ODT  Take 2 tablets (8 mg total) by mouth every 8 (eight) hours as needed  (Nausea).     oxyCODONE-acetaminophen  mg per tablet  Commonly known as: PERCOCET  Take 1 tablet by mouth every 6 (six) hours as needed for Pain.     senna-docusate 8.6-50 mg 8.6-50 mg per tablet  Commonly known as: PERICOLACE  Take 1 tablet by mouth 2 (two) times daily.            CONTINUE taking these medications      amLODIPine 5 MG tablet  Commonly known as: NORVASC  TAKE 1 TABLET (5 MG TOTAL) BY MOUTH ONCE DAILY FOR BLOOD PRESSURE.     * LANTUS SOLOSTAR U-100 INSULIN glargine 100 units/mL SubQ pen  Generic drug: insulin  Inject 62 Units into the skin every evening.     * LANTUS SOLOSTAR U-100 INSULIN glargine 100 units/mL SubQ pen  Generic drug: insulin  62 Units by abdominal subcutaneous route every evening.     nebivoloL 5 MG Tab  Commonly known as: BYSTOLIC  Take 10 mg by mouth once daily.     olmesartan 40 MG tablet  Commonly known as: BENICAR  Take 40 mg by mouth once daily.     RYBELSUS 3 mg tablet  Generic drug: semaglutide  Take 3 mg by mouth once daily.           * This list has 2 medication(s) that are the same as other medications prescribed for you. Read the directions carefully, and ask your doctor or other care provider to review them with you.                Discharge Procedure Orders   Ambulatory referral/consult to Home Health   Standing Status: Future   Referral Priority: Routine Referral Type: Home Health Care   Referral Reason: Specialty Services Required   Requested Specialty: Home Health Services   Number of Visits Requested: 1     Diet general     Remove dressing in 48 hours     Change dressing (specify)   Order Comments: Dressing change:  On POD 3- recommend dressing change via Home Health     Call MD for:  temperature >100.4     Call MD for:  persistent nausea and vomiting     Call MD for:  severe uncontrolled pain     Call MD for:  difficulty breathing, headache or visual disturbances     Call MD for:  redness, tenderness, or signs of infection (pain, swelling, redness, odor or  green/yellow discharge around incision site)     Call MD for:  hives     Call MD for:  persistent dizziness or light-headedness     Call MD for:  extreme fatigue      Contact information for follow-up providers       GABRIELLE Tolentino Follow up in 2 week(s).    Specialties: Family Medicine, Emergency Medicine, Orthopedic Surgery  Why: 9/30/22 0800  Contact information:  [address] [place] [id number]  [phone]                       Contact information for after-discharge care       Destination       [facility] .    Service: Skilled Nursing  Contact information:  [address] [facility] Fanny [address]  [facility] [address]  [phone]

## 2022-09-26 NOTE — PLAN OF CARE
Problem: Physical Therapy  Goal: Physical Therapy Goal  Description: Short Term Goals  1. Patient will complete 30 reps of B LE exercises with correct form.   2. Patient will complete sit<>stand transfers with SBA.  3. Patient will ambulate 100 feet with RW on level surfaces with CGA.     Long Term Goals   1. Patient will ambulate 300 feet with RW on level and unlevel surfaces with SBA.  2. Patient will complete all functional transfers with MOD I.  3. L knee AROM 0-110.   4. Patient will negotiate up and down 5 stairs with use of handrail with SBA.     Outcome: Ongoing, Progressing   Continue POC Per PT order to progress patient toward rehab goals.  DANNIE Angel 9/26/2022

## 2022-09-26 NOTE — SUBJECTIVE & OBJECTIVE
Interval History: doing well. No complaints voiced.    Review of Systems  Objective:     Vital Signs (Most Recent):  Temp: 97.6 °F (36.4 °C) (09/26/22 0737)  Pulse: 73 (09/26/22 0737)  Resp: 18 (09/26/22 0737)  BP: (!) 140/74 (09/26/22 0737)  SpO2: 99 % (09/26/22 0737)   Vital Signs (24h Range):  Temp:  [97.6 °F (36.4 °C)-98.9 °F (37.2 °C)] 97.6 °F (36.4 °C)  Pulse:  [63-73] 73  Resp:  [18-20] 18  SpO2:  [96 %-99 %] 99 %  BP: (140-171)/(65-74) 140/74     Weight: 87.4 kg (192 lb 10.9 oz)  Body mass index is 32.06 kg/m².    Intake/Output Summary (Last 24 hours) at 9/26/2022 0802  Last data filed at 9/25/2022 1813  Gross per 24 hour   Intake 360 ml   Output --   Net 360 ml      Physical Exam    Significant Labs: All pertinent labs within the past 24 hours have been reviewed.    Significant Imaging: I have reviewed all pertinent imaging results/findings within the past 24 hours.

## 2022-09-26 NOTE — PT/OT/SLP PROGRESS
"Physical Therapy  Treatment    Jonna Cunha   MRN: [id number]   Admitting Diagnosis: S/P total knee arthroplasty, left    PT Received On: 09/26/22  PT Start Time: 915  PT Stop Time: 1320    PT Total Time (min): 25 min       Billable Minutes:  Ther Ex 25       Treatment Type: Treatment  PT/PTA: PTA     PTA visit number: 1     General Precautions: Standard, fall  Orthopedic Precautions: LLE weight bearing as tolerated   Braces: Knee immobilizer  Respiratory Status: Room air    Spiritual, Cultural Beliefs, Gnosticist Practices, Values that Affect Care: no    Subjective:  "My knee (L) isn't hurting too bad today, but it gave me some trouble over the weekend.  I think it was from all the activity".  Patient has follow up with surgeon tomorrow.     Pain/Comfort  Pain Rating 1: 4/10  Location - Side 1: Left  Location - Orientation 1: generalized  Location 1: knee    Objective:        Functional Mobility:  Bed Mobility: Sit to supine: min A x 1 for management of L LE onto bed       Transfers: Sit<>stand to RW SBA       Stairs: Not Completed      Balance:   Static Sit: NORMAL: No deviations seen in posture held statically  Dynamic Sit: NORMAL: No deviations seen in posture held dynamically  Static Stand: FAIR: Maintains without assist but unable to take challenges  Dynamic stand: FAIR: Needs CONTACT GUARD during gait       Therapeutic Activities and Exercises:     TherEx Reps   NuStep  10 minutes    Quad Sets 30 x 5"    Glute Sets    Ankle pumps 30 x    LAQ 3 x 10, 3"    Heel Slides 30 x (103 degrees)    Seated ADD 30 x 3"    SAQ's    Hip ABD     Seated Marches    Hamstring Curls 30 x red TB                TherAct        AM-PAC 6 CLICK MOBILITY  How much help from another person does this patient currently need?   1 = Unable, Total/Dependent Assistance  2 = A lot, Maximum/Moderate Assistance  3 = A little, Minimum/Contact Guard/Supervision  4 = None, Modified [place]/Independent    Turning over in bed (including " adjusting bedclothes, sheets and blankets)?: 4  Sitting down on and standing up from a chair with arms (e.g., wheelchair, bedside commode, etc.): 4  Moving from lying on back to sitting on the side of the bed?: 3  Moving to and from a bed to a chair (including a wheelchair)?: 4  Need to walk in hospital room?: 4  Climbing 3-5 steps with a railing?: 3  Basic Mobility Total Score: 22    AM-PAC Raw Score CMS G-Code Modifier Level of Impairment Assistance   6 % Total / Unable   7 - 9 CM 80 - 100% Maximal Assist   10 - 14 CL 60 - 80% Moderate Assist   15 - 19 CK 40 - 60% Moderate Assist   20 - 22 CJ 20 - 40% Minimal Assist   23 CI 1-20% SBA / CGA   24 CH 0% Independent/ Mod I         Patient was returned to room at end of treatment session.  Patient left seated with call bell within reach.     Assessment:  Jonna Cunha is a 67 y.o. female with a medical diagnosis of S/P total knee arthroplasty, left and presents with pain and impaired mobility. LPTA provided patient with proper setup on NuStep to increase L knee ROM and flexibility prior to exercises. Patient progressed through exercises slowly with frequent rest breaks due to L LE fatigue.  Left knee AROM  flexion improved from am session.  Patient reports she would like to hold off on stair training secondary to leaving facility tomorrow for follow up appointment with surgeon, and doesn't want to be too sore or swollen.  No adverse effects to PT treatment session noted.     Rehab identified problem list/impairments:     Rehab potential is good.    Activity tolerance: Good    Discharge recommendations: Discharge Facility/Level of Care Needs: outpatient PT     Barriers to discharge:      Equipment recommendations: Equipment Needed After Discharge: walker, rolling     GOALS:   Multidisciplinary Problems       Physical Therapy Goals          Problem: Physical Therapy    Goal Priority Disciplines Outcome Goal Variances Interventions   Physical Therapy Goal     PT,  PT/OT Ongoing, Progressing     Description: Short Term Goals  1. Patient will complete 30 reps of B LE exercises with correct form.   2. Patient will complete sit<>stand transfers with SBA.  3. Patient will ambulate 100 feet with RW on level surfaces with CGA.     Long Term Goals   1. Patient will ambulate 300 feet with RW on level and unlevel surfaces with SBA.  2. Patient will complete all functional transfers with MOD I.  3. L knee AROM 0-110.   4. Patient will negotiate up and down 5 stairs with use of handrail with SBA.                          PLAN:    Patient to be seen BID  to address the above listed problems via gait training, therapeutic activities, therapeutic exercises  Plan of Care expires: 10/06/22  Plan of Care reviewed with: patient    Continue POC Per PT order to progress patient toward rehab goals as tolerated by patient.    DANNIE Angel       9/26/2022

## 2022-09-26 NOTE — NURSING
No acute distress noted, no c/o voiced, Denies any needs at this time, Will cont to monitor for changes.

## 2022-09-26 NOTE — PT/OT/SLP PROGRESS
"Physical Therapy  Treatment    Jonna Cunha   MRN: [id number]   Admitting Diagnosis: S/P total knee arthroplasty, left    PT Received On: 09/26/22  PT Start Time: 915  PT Stop Time: 1002    PT Total Time (min): 47 min       Billable Minutes:  Gait 14, Ther Ex 33      Treatment Type: Treatment  PT/PTA: PT     PTA visit number: 1     General Precautions: Standard, fall  Orthopedic Precautions: LLE weight bearing as tolerated   Braces:    Respiratory Status: Room air    Spiritual, Cultural Beliefs, Tenriism Practices, Values that Affect Care: no    Subjective:  Patient reports 7/10 L knee pain this morning and is agreeable to PT treatment.     Pain/Comfort  Pain Rating 1: 7/10  Location - Side 1: Left  Location - Orientation 1: generalized  Location 1: knee  Pain Addressed 1: Other (see comments) (patient had already received pain medication)    Objective:        Functional Mobility:  Bed Mobility: Sit to supine: min A x 1 for management of L LE onto bed       Transfers: Sit<>stand to RW SBA     Gait: Approx. 300' with RW and CGA on level indoor surface and unlevel outdoor surfaces with cues to progress to step through pattern and staying closer to AD during turn. Patient frequently steps outside of her RW when ambulating.     Stairs: Not Completed      Balance:   Static Sit: NORMAL: No deviations seen in posture held statically  Dynamic Sit: NORMAL: No deviations seen in posture held dynamically  Static Stand: FAIR: Maintains without assist but unable to take challenges  Dynamic stand: FAIR: Needs CONTACT GUARD during gait       Therapeutic Activities and Exercises:     TherEx Reps   NuStep  10 minutes    Quad Sets    Glute Sets    Ankle pumps 30 x    LAQ 3 x 10, 3"    Heel Slides 30 x (100 degrees)    Seated ADD 30 x 3"    SAQ's    Hip ABD  30 x red TB    Seated Marches    Hamstring Curls 20 x red TB                TherAct        AM-PAC 6 CLICK MOBILITY  How much help from another person does this patient currently " need?   1 = Unable, Total/Dependent Assistance  2 = A lot, Maximum/Moderate Assistance  3 = A little, Minimum/Contact Guard/Supervision  4 = None, Modified [place]/Independent    Turning over in bed (including adjusting bedclothes, sheets and blankets)?: 4  Sitting down on and standing up from a chair with arms (e.g., wheelchair, bedside commode, etc.): 4  Moving from lying on back to sitting on the side of the bed?: 3  Moving to and from a bed to a chair (including a wheelchair)?: 4  Need to walk in hospital room?: 4  Climbing 3-5 steps with a railing?: 2  Basic Mobility Total Score: 21    AM-PAC Raw Score CMS G-Code Modifier Level of Impairment Assistance   6 % Total / Unable   7 - 9 CM 80 - 100% Maximal Assist   10 - 14 CL 60 - 80% Moderate Assist   15 - 19 CK 40 - 60% Moderate Assist   20 - 22 CJ 20 - 40% Minimal Assist   23 CI 1-20% SBA / CGA   24 CH 0% Independent/ Mod I         Patient was returned to room at end of treatment session.  Patient left seated with call bell within reach.     Assessment:  Jonna Cunha is a 67 y.o. female with a medical diagnosis of S/P total knee arthroplasty, left and presents with pain and impaired mobility. PT provided patient with proper setup on NuStep to increase L knee ROM and flexibility prior to exercises. Patient progressed through exercises slowly with frequent rest breaks due to L LE fatigue. Patient achieved 101 degrees of L knee flexion in seated position. Patient required frequent cueing for safety during gait training with RW.     Rehab identified problem list/impairments:     Rehab potential is good.    Activity tolerance: Good    Discharge recommendations: Discharge Facility/Level of Care Needs: outpatient PT     Barriers to discharge:      Equipment recommendations: Equipment Needed After Discharge: walker, rolling     GOALS:   Multidisciplinary Problems       Physical Therapy Goals          Problem: Physical Therapy    Goal Priority Disciplines  Outcome Goal Variances Interventions   Physical Therapy Goal     PT, PT/OT Ongoing, Progressing     Description: Short Term Goals  1. Patient will complete 30 reps of B LE exercises with correct form.   2. Patient will complete sit<>stand transfers with SBA.  3. Patient will ambulate 100 feet with RW on level surfaces with CGA.     Long Term Goals   1. Patient will ambulate 300 feet with RW on level and unlevel surfaces with SBA.  2. Patient will complete all functional transfers with MOD I.  3. L knee AROM 0-110.   4. Patient will negotiate up and down 5 stairs with use of handrail with SBA.                          PLAN:    Patient to be seen BID  to address the above listed problems via gait training, therapeutic activities, therapeutic exercises  Plan of Care expires: 10/06/22  Plan of Care reviewed with: patient    Continue POC Per PT order to progress patient toward rehab goals as tolerated by patient.    Nelda Silva, PT, DPT      9/26/2022

## 2022-09-26 NOTE — PROGRESS NOTES
[facility]  Hospital Medicine  Progress Note    Patient Name: Jonna Cunha  MRN: [id number]  Patient Class: IP- Swing   Admission Date: 9/15/2022  Length of Stay: 11 days  Attending Physician: Abimbola Fischer, *  Primary Care Provider: Abimbola Fischer MD        Subjective:     Principal Problem:S/P total knee arthroplasty, left        HPI:  No notes on file    Overview/Hospital Course:  9/16/22 - pt. Staying in pain. Was not on narcotics before surgery.    9/19/22 - feels tired today. Had a hypoglycemic episode this weekend. Will continue present treatment.    9/23/22 - doing well. Voices no complaints.    9/26/22 - doing well. No complaints voiced.      Interval History: doing well. No complaints voiced.    Review of Systems  Objective:     Vital Signs (Most Recent):  Temp: 97.6 °F (36.4 °C) (09/26/22 0737)  Pulse: 73 (09/26/22 0737)  Resp: 18 (09/26/22 0737)  BP: (!) 140/74 (09/26/22 0737)  SpO2: 99 % (09/26/22 0737)   Vital Signs (24h Range):  Temp:  [97.6 °F (36.4 °C)-98.9 °F (37.2 °C)] 97.6 °F (36.4 °C)  Pulse:  [63-73] 73  Resp:  [18-20] 18  SpO2:  [96 %-99 %] 99 %  BP: (140-171)/(65-74) 140/74     Weight: 87.4 kg (192 lb 10.9 oz)  Body mass index is 32.06 kg/m².    Intake/Output Summary (Last 24 hours) at 9/26/2022 0802  Last data filed at 9/25/2022 1813  Gross per 24 hour   Intake 360 ml   Output --   Net 360 ml      Physical Exam    Significant Labs: All pertinent labs within the past 24 hours have been reviewed.    Significant Imaging: I have reviewed all pertinent imaging results/findings within the past 24 hours.      Assessment/Plan:      Acute meniscal tear of left knee          VTE Risk Mitigation (From admission, onward)         Ordered     IP VTE HIGH RISK PATIENT  Once         09/15/22 1613     Place SELINA hose  Until discontinued         09/15/22 1613                Discharge Planning   DOTTIE: 10/5/2022     Code Status: Full Code   Is the patient  medically ready for discharge?:     Reason for patient still in hospital (select all that apply): Patient trending condition  Discharge Plan A: Home with family                  Abimbola Fischer MD  Department of Hospital Medicine   [facility]

## 2022-09-26 NOTE — PT/OT/SLP PROGRESS
"Occupational Therapy  Treatment    Jonna Cunha   MRN: [id number]   Admitting Diagnosis: S/P total knee arthroplasty, left    OT Date of Treatment: 09/26/22   OT Start Time: 1216  OT Stop Time: 1238  OT Total Time (min): 22 min    Billable Minutes:  Therapeutic Exercise 22 min               General Precautions: Standard, fall  Orthopedic Precautions: LLE weight bearing as tolerated  Braces:    Respiratory Status: Room air         Subjective:  Communicated with RN prior to session.    Pain/Comfort  Pain Rating 1: 7/10    Objective:        Functional Mobility:  Bed Mobility:       Transfers:        Functional Ambulation:     Activities of Daily Living:         Therapeutic Activities and Exercises:  Patient completed the following for increased strength to increase I with ADLs: 3# dowel- shoulder press, chest press, bicep curls x 40, yellow theraflex x 40 both ways, yellow theraband- scapular retraction and tricep extension x 40; UBE 7 min      AM-PAC 6 CLICK ADL   How much help from another person does this patient currently need?   1 = Unable, Total/Dependent Assistance  2 = A lot, Maximum/Moderate Assistance  3 = A little, Minimum/Contact Guard/Supervision  4 = None, Modified [place]/Independent    Putting on and taking off regular lower body clothing? : 3  Bathing (including washing, rinsing, drying)?: 3  Toileting, which includes using toilet, bedpan, or urinal? : 4  Putting on and taking off regular upper body clothing?: 4  Taking care of personal grooming such as brushing teeth?: 4  Eating meals?: 4  Daily Activity Total Score: 22     AM-PAC Raw Score CMS "G-Code Modifier Level of Impairment Assistance   6 % Total / Unable   7 - 8 CM 80 - 100% Maximal Assist   9-13 CL 60 - 80% Moderate Assist   14 - 19 CK 40 - 60% Moderate Assist   20 - 22 CJ 20 - 40% Minimal Assist   23 CI 1-20% SBA / CGA   24 CH 0% Independent/ Mod I       Patient left up in chair with  PT notified    ASSESSMENT:  Jonna Cunha is " a 67 y.o. female with a medical diagnosis of S/P total knee arthroplasty, left and presents with decreased strength, decreased endurance, decreased self-care, decreased mobility.    Rehab identified problem list/impairments: Rehab identified problem list/impairments: weakness, impaired endurance, impaired self care skills, impaired functional mobility, impaired balance, decreased lower extremity function, decreased safety awareness    Rehab potential is excellent.    Activity tolerance: Excellent    Discharge recommendations: Discharge Facility/Level of Care Needs: outpatient PT     Barriers to discharge:      Equipment recommendations: walker, rolling     GOALS:   Multidisciplinary Problems       Occupational Therapy Goals          Problem: Occupational Therapy    Goal Priority Disciplines Outcome Interventions   Occupational Therapy Goal     OT, PT/OT Ongoing, Progressing    Description: Description: Grooming Status:   Short Term Goal: Pt will perform grooming with s/u sitting EOB.   Long Term Goal: Pt will perform grooming/oral hygiene standing at sink with Mod I      LE dressing Status:   Short Term Goal: Pt will perform LE dressing with mod a.   Long Term Goal: Pt will perform LE dressing with min a.    Toileting Status:   Short Term Goal: Pt will perform toilet hygiene on BSC with s/u.  Long Term Goal: Pt will perform toilet hygiene on toilet with no AE with Mod I.    Commode Transfer:   Short Term Goal: Pt will perform BSC t/f with s/u.  Long Term Goal:  Pt will perform toilet t/f in bathroom with Mod I.     Bathing Status:   Long Term Goal: Pt will perform sponge bath with s/u with no unsafe fatigue.     Strength Status:   Long Term Goal: Pt to perform BUE strengthening with weights and/or body weight to increase ADL independence and safety    Endurance Status:   Short Term Goal:pt to perform 15 min OT treatment with 5 or greater rest breaks  Long Term Goal: pt to perform 30 min OT treat with 3 or less rest  breaks                         Plan:  Patient to be seen 5 x/week to address the above listed problems via therapeutic exercises  Plan of Care expires:    Plan of Care reviewed with: patient       Negra Rachael, OTR/CRISTINA    09/26/2022

## 2022-09-26 NOTE — PLAN OF CARE
Problem: Infection  Goal: Absence of Infection Signs and Symptoms  Outcome: Ongoing, Progressing  Intervention: Prevent or Manage Infection  Flowsheets (Taken 9/26/2022 1617)  Fever Reduction/Comfort Measures: fluid intake increased  Infection Management: aseptic technique maintained  Isolation Precautions: precautions maintained     Problem: Fall Injury Risk  Goal: Absence of Fall and Fall-Related Injury  Outcome: Ongoing, Progressing  Intervention: Identify and Manage Contributors  Flowsheets (Taken 9/26/2022 1617)  Self-Care Promotion: independence encouraged  Medication Review/Management: medications reviewed  Intervention: Promote Injury-Free Environment  Flowsheets (Taken 9/26/2022 1617)  Safety Promotion/Fall Prevention: assistive device/personal item within reach

## 2022-09-27 LAB — GLUCOSE SERPL-MCNC: 122 MG/DL (ref 70–105)

## 2022-09-27 PROCEDURE — 11000004 HC SNF PRIVATE

## 2022-09-27 PROCEDURE — 97110 THERAPEUTIC EXERCISES: CPT | Mod: CQ

## 2022-09-27 PROCEDURE — 97110 THERAPEUTIC EXERCISES: CPT

## 2022-09-27 PROCEDURE — 25000003 PHARM REV CODE 250: Performed by: INTERNAL MEDICINE

## 2022-09-27 PROCEDURE — 63600175 PHARM REV CODE 636 W HCPCS: Performed by: INTERNAL MEDICINE

## 2022-09-27 PROCEDURE — 25000003 PHARM REV CODE 250: Performed by: FAMILY MEDICINE

## 2022-09-27 PROCEDURE — 82962 GLUCOSE BLOOD TEST: CPT

## 2022-09-27 PROCEDURE — 99900035 HC TECH TIME PER 15 MIN (STAT)

## 2022-09-27 PROCEDURE — 97530 THERAPEUTIC ACTIVITIES: CPT

## 2022-09-27 PROCEDURE — 97116 GAIT TRAINING THERAPY: CPT | Mod: CQ

## 2022-09-27 PROCEDURE — 94761 N-INVAS EAR/PLS OXIMETRY MLT: CPT

## 2022-09-27 RX ADMIN — CELECOXIB 200 MG: 100 CAPSULE ORAL at 08:09

## 2022-09-27 RX ADMIN — METOPROLOL TARTRATE 25 MG: 25 TABLET, FILM COATED ORAL at 08:09

## 2022-09-27 RX ADMIN — PREGABALIN 75 MG: 75 CAPSULE ORAL at 08:09

## 2022-09-27 RX ADMIN — AMLODIPINE BESYLATE 5 MG: 2.5 TABLET ORAL at 08:09

## 2022-09-27 RX ADMIN — FAMOTIDINE 20 MG: 20 TABLET, FILM COATED ORAL at 08:09

## 2022-09-27 RX ADMIN — INSULIN DETEMIR 62 UNITS: 100 INJECTION, SOLUTION SUBCUTANEOUS at 08:09

## 2022-09-27 RX ADMIN — OXYCODONE HYDROCHLORIDE AND ACETAMINOPHEN 1 TABLET: 10; 325 TABLET ORAL at 07:09

## 2022-09-27 RX ADMIN — OXYCODONE HYDROCHLORIDE AND ACETAMINOPHEN 1 TABLET: 10; 325 TABLET ORAL at 12:09

## 2022-09-27 RX ADMIN — ASPIRIN 325 MG ORAL TABLET 325 MG: 325 PILL ORAL at 08:09

## 2022-09-27 RX ADMIN — OXYCODONE HYDROCHLORIDE AND ACETAMINOPHEN 1 TABLET: 10; 325 TABLET ORAL at 08:09

## 2022-09-27 RX ADMIN — SENNOSIDES AND DOCUSATE SODIUM 1 TABLET: 50; 8.6 TABLET ORAL at 08:09

## 2022-09-27 RX ADMIN — LOSARTAN POTASSIUM 100 MG: 100 TABLET, FILM COATED ORAL at 08:09

## 2022-09-27 RX ADMIN — ZOLPIDEM TARTRATE 5 MG: 5 TABLET, COATED ORAL at 08:09

## 2022-09-27 NOTE — PT/OT/SLP PROGRESS
"Occupational Therapy  Treatment    Jonna Cunha   MRN: [id number]   Admitting Diagnosis: S/P total knee arthroplasty, left    OT Date of Treatment: 09/27/22   OT Start Time: 0944  OT Stop Time: 1011  OT Total Time (min): 27 min    Billable Minutes:  Therapeutic Exercise 27 min               General Precautions: Standard, fall  Orthopedic Precautions: LLE weight bearing as tolerated  Braces:    Respiratory Status: Room air         Subjective:  Communicated with RN prior to session.    Pain/Comfort  Pain Rating 1: 7/10    Objective:        Functional Mobility:  Bed Mobility:       Transfers:        Functional Ambulation:     Activities of Daily Living:         Therapeutic Activities and Exercises:  Patient completed the following for increased strength to increase I with ADLs: 3# dowel- shoulder press, chest press, bicep curls x 40, yellow theraflex x 40 both ways, yellow theraband- scapular retraction, rows and tricep extension x 40; UBE 7 min      AM-PAC 6 CLICK ADL   How much help from another person does this patient currently need?   1 = Unable, Total/Dependent Assistance  2 = A lot, Maximum/Moderate Assistance  3 = A little, Minimum/Contact Guard/Supervision  4 = None, Modified [place]/Independent    Putting on and taking off regular lower body clothing? : 3  Bathing (including washing, rinsing, drying)?: 3  Toileting, which includes using toilet, bedpan, or urinal? : 4  Putting on and taking off regular upper body clothing?: 4  Taking care of personal grooming such as brushing teeth?: 4  Eating meals?: 4  Daily Activity Total Score: 22     AM-PAC Raw Score CMS "G-Code Modifier Level of Impairment Assistance   6 % Total / Unable   7 - 8 CM 80 - 100% Maximal Assist   9-13 CL 60 - 80% Moderate Assist   14 - 19 CK 40 - 60% Moderate Assist   20 - 22 CJ 20 - 40% Minimal Assist   23 CI 1-20% SBA / CGA   24 CH 0% Independent/ Mod I       Patient left up in chair with call button in " reach    ASSESSMENT:  Jonna Cunha is a 67 y.o. female with a medical diagnosis of S/P total knee arthroplasty, left and presents with decreased strength, decreased endurance, decreased self-care, decreased mobility.    Rehab identified problem list/impairments: Rehab identified problem list/impairments: weakness, impaired endurance, impaired self care skills, impaired functional mobility, impaired balance, decreased lower extremity function, decreased safety awareness    Rehab potential is excellent.    Activity tolerance: Excellent    Discharge recommendations: Discharge Facility/Level of Care Needs: outpatient PT     Barriers to discharge:      Equipment recommendations: walker, rolling     GOALS:   Multidisciplinary Problems       Occupational Therapy Goals          Problem: Occupational Therapy    Goal Priority Disciplines Outcome Interventions   Occupational Therapy Goal     OT, PT/OT Ongoing, Progressing    Description: Description: Grooming Status:   Short Term Goal: Pt will perform grooming with s/u sitting EOB.   Long Term Goal: Pt will perform grooming/oral hygiene standing at sink with Mod I      LE dressing Status:   Short Term Goal: Pt will perform LE dressing with mod a.   Long Term Goal: Pt will perform LE dressing with min a.    Toileting Status:   Short Term Goal: Pt will perform toilet hygiene on BSC with s/u.  Long Term Goal: Pt will perform toilet hygiene on toilet with no AE with Mod I.    Commode Transfer:   Short Term Goal: Pt will perform BSC t/f with s/u.  Long Term Goal:  Pt will perform toilet t/f in bathroom with Mod I.     Bathing Status:   Long Term Goal: Pt will perform sponge bath with s/u with no unsafe fatigue.     Strength Status:   Long Term Goal: Pt to perform BUE strengthening with weights and/or body weight to increase ADL independence and safety    Endurance Status:   Short Term Goal:pt to perform 15 min OT treatment with 5 or greater rest breaks  Long Term Goal: pt to  perform 30 min OT treat with 3 or less rest breaks                         Plan:  Patient to be seen 5 x/week to address the above listed problems via therapeutic exercises  Plan of Care expires:    Plan of Care reviewed with: patient       Negra Rachael, OTR/L    09/27/2022

## 2022-09-27 NOTE — PT/OT/SLP PROGRESS
"Physical Therapy  Treatment    Jonna Cunha   MRN: [id number]   Admitting Diagnosis: S/P total knee arthroplasty, left    PT Received On: 09/27/22  PT Start Time: 12:20  PT Stop Time: 1306    PT Total Time (min): 46 min       Billable Minutes: THER EX 29 minutes;  THER ACT 10 minutes  Gait: 7 minutes (not billed)      Treatment Type: Treatment  PT/PTA: PT     PTA visit number: 1     General Precautions: Standard, fall  Orthopedic Precautions: LLE weight bearing as tolerated   Braces:    Respiratory Status: Room air    Spiritual, Cultural Beliefs, Sabianist Practices, Values that Affect Care: no    Subjective:  Patient is found supine in bed with HOB elevated. She is agreeable to PT treatment.     Pain/Comfort  Pain Rating 1: 6/10  Location - Side 1: Left  Location - Orientation 1: generalized  Location 1: knee  Pain Addressed 1: Distraction    Objective:        Functional Mobility:  Bed Mobility: SBA for supine to sit and min A for sit to supine for management of L LE onto bed.      Transfers: 2 x 10 Sit<>stand to RW SBA       Stairs: Not Completed    Gait: Patient ambulated approximately 2 x 150  feet with RW and SBA   Balance:   Static Sit: NORMAL: No deviations seen in posture held statically  Dynamic Sit: NORMAL: No deviations seen in posture held dynamically  Static Stand: FAIR: Maintains without assist but unable to take challenges  Dynamic stand: FAIR: Needs CONTACT GUARD during gait       Therapeutic Activities and Exercises:     TherEx Reps   NuStep  10 minutes    Quad Sets 30 x 3"    Glute Sets    Ankle pumps    LAQ 3 x 10   Heel Slides 30 x 3"    Seated ADD 30 x 3"    SLRs     Hip ABD  30 x red TB   Seated Marches 3 x 10    Hamstring Curls 20 x red TB                TherAct        AM-PAC 6 CLICK MOBILITY  How much help from another person does this patient currently need?   1 = Unable, Total/Dependent Assistance  2 = A lot, Maximum/Moderate Assistance  3 = A little, Minimum/Contact Guard/Supervision  4 = " None, Modified [place]/Independent    Turning over in bed (including adjusting bedclothes, sheets and blankets)?: 4  Sitting down on and standing up from a chair with arms (e.g., wheelchair, bedside commode, etc.): 4  Moving from lying on back to sitting on the side of the bed?: 3  Moving to and from a bed to a chair (including a wheelchair)?: 4  Need to walk in hospital room?: 4  Climbing 3-5 steps with a railing?: 3  Basic Mobility Total Score: 22    AM-PAC Raw Score CMS G-Code Modifier Level of Impairment Assistance   6 % Total / Unable   7 - 9 CM 80 - 100% Maximal Assist   10 - 14 CL 60 - 80% Moderate Assist   15 - 19 CK 40 - 60% Moderate Assist   20 - 22 CJ 20 - 40% Minimal Assist   23 CI 1-20% SBA / CGA   24 CH 0% Independent/ Mod I         Assessment:  Jonna Cunha is a 67 y.o. female with a medical diagnosis of S/P total knee arthroplasty, left and presents with pain and impaired mobility. Patient required cueing to decrease speed and increase hold time with ther-ex for improved motor control. Patient had no reports of adverse effects to treatment. She is progressing well toward her rehab goals.   Rehab identified problem list/impairments:     Rehab potential is good.    Activity tolerance: Good    Discharge recommendations: Discharge Facility/Level of Care Needs: outpatient PT     Barriers to discharge:      Equipment recommendations: Equipment Needed After Discharge: walker, rolling     GOALS:   Multidisciplinary Problems       Physical Therapy Goals          Problem: Physical Therapy    Goal Priority Disciplines Outcome Goal Variances Interventions   Physical Therapy Goal     PT, PT/OT Ongoing, Progressing     Description: Short Term Goals  1. Patient will complete 30 reps of B LE exercises with correct form.   2. Patient will complete sit<>stand transfers with SBA.  3. Patient will ambulate 100 feet with RW on level surfaces with CGA.     Long Term Goals   1. Patient will ambulate 300 feet  with RW on level and unlevel surfaces with SBA.  2. Patient will complete all functional transfers with MOD I.  3. L knee AROM 0-110.   4. Patient will negotiate up and down 5 stairs with use of handrail with SBA.                          PLAN:    Patient to be seen BID  to address the above listed problems via gait training, therapeutic activities, therapeutic exercises  Plan of Care expires: 10/06/22  Plan of Care reviewed with: patient    Continue POC Per PT order to progress patient toward rehab goals as tolerated by patient.      Nelda Silva, PT, DPT     9/27/2022

## 2022-09-27 NOTE — PT/OT/SLP PROGRESS
"Physical Therapy  Treatment    Jonna Cunha   MRN: [id number]   Admitting Diagnosis: S/P total knee arthroplasty, left    PT Received On: 09/27/22  PT Start Time: 0905  PT Stop Time: 0943  Total Time: 38               Billable Minutes: Ther Ex 28, Gait 10       Treatment Type: Treatment  PT/PTA: PTA     PTA visit number: 1     General Precautions: Standard, fall  Orthopedic Precautions: LLE weight bearing as tolerated   Braces:    Respiratory Status: Room air    Spiritual, Cultural Beliefs, Pentecostal Practices, Values that Affect Care: no    Subjective:  Patient is found supine in bed with HOB elevated, and was pleasant and agreeable to AM PT treatment session.     Pain/Comfort  Pain Rating 1: 6/10  Location - Side 1: Left  Location - Orientation 1: generalized  Location 1: knee  Pain Addressed 1: Pre-medicate for activity    Objective:        Functional Mobility:  Bed Mobility: Supine with HOB elevated to sitting EOB with SBA. Sitting edge of tall treatment table to supine on tall treatment table with SBA       Transfers: Sit<>stand to RW SBA       Stairs: Not Completed    Gait: Patient ambulated approximately 140 feet with RW and SBA   Balance:   Static Sit: NORMAL: No deviations seen in posture held statically  Dynamic Sit: NORMAL: No deviations seen in posture held dynamically  Static Stand: FAIR: Maintains without assist but unable to take challenges  Dynamic stand: FAIR: Needs CONTACT GUARD during gait       Therapeutic Activities and Exercises:     TherEx Reps   NuStep  10 minutes    Quad Sets 30 x 3"    Glute Sets    Ankle pumps    LAQ 3 x 10   Heel Slides 30 x 3"    Seated ADD 30 x 3"    SLRs  10x    Hip ABD  20 x red TB   Seated Marches 3 x 10    Hamstring Curls 20 x red TB                TherAct        AM-PAC 6 CLICK MOBILITY  How much help from another person does this patient currently need?   1 = Unable, Total/Dependent Assistance  2 = A lot, Maximum/Moderate Assistance  3 = A little, Minimum/Contact " Guard/Supervision  4 = None, Modified [place]/Independent    Turning over in bed (including adjusting bedclothes, sheets and blankets)?: 4  Sitting down on and standing up from a chair with arms (e.g., wheelchair, bedside commode, etc.): 4  Moving from lying on back to sitting on the side of the bed?: 3  Moving to and from a bed to a chair (including a wheelchair)?: 4  Need to walk in hospital room?: 4  Climbing 3-5 steps with a railing?: 3  Basic Mobility Total Score: 22    AM-PAC Raw Score CMS G-Code Modifier Level of Impairment Assistance   6 % Total / Unable   7 - 9 CM 80 - 100% Maximal Assist   10 - 14 CL 60 - 80% Moderate Assist   15 - 19 CK 40 - 60% Moderate Assist   20 - 22 CJ 20 - 40% Minimal Assist   23 CI 1-20% SBA / CGA   24 CH 0% Independent/ Mod I         Assessment:  Jonna Cunha is a 67 y.o. female with a medical diagnosis of S/P total knee arthroplasty, left and presents with pain and impaired mobility. Patient was provided with visual, verbal, and tactile cues for proper form, quad activation, and hold times with treatment tasks. Patient reported that she felt like she might get a cramp in her LLE while performing quad sets. Patient also reported that she had difficulty completing quad sets and SLRs. Patient declined ice application after completion of exercises, and she was left in rehab gym in care of OT.     Rehab identified problem list/impairments:     Rehab potential is good.    Activity tolerance: Good    Discharge recommendations: Discharge Facility/Level of Care Needs: outpatient PT     Barriers to discharge:      Equipment recommendations: Equipment Needed After Discharge: walker, rolling     GOALS:   Multidisciplinary Problems       Physical Therapy Goals          Problem: Physical Therapy    Goal Priority Disciplines Outcome Goal Variances Interventions   Physical Therapy Goal     PT, PT/OT Ongoing, Progressing     Description: Short Term Goals  1. Patient will complete  30 reps of B LE exercises with correct form.   2. Patient will complete sit<>stand transfers with SBA.  3. Patient will ambulate 100 feet with RW on level surfaces with CGA.     Long Term Goals   1. Patient will ambulate 300 feet with RW on level and unlevel surfaces with SBA.  2. Patient will complete all functional transfers with MOD I.  3. L knee AROM 0-110.   4. Patient will negotiate up and down 5 stairs with use of handrail with SBA.                          PLAN:    Patient to be seen BID  to address the above listed problems via gait training, therapeutic activities, therapeutic exercises  Plan of Care expires: 10/06/22  Plan of Care reviewed with: patient    Continue POC Per PT order to progress patient toward rehab goals as tolerated by patient.    DANNIE Michel       9/27/2022

## 2022-09-28 LAB
GLUCOSE SERPL-MCNC: 166 MG/DL (ref 70–105)
GLUCOSE SERPL-MCNC: 180 MG/DL (ref 70–105)

## 2022-09-28 PROCEDURE — 97110 THERAPEUTIC EXERCISES: CPT

## 2022-09-28 PROCEDURE — 97116 GAIT TRAINING THERAPY: CPT

## 2022-09-28 PROCEDURE — 63600175 PHARM REV CODE 636 W HCPCS: Performed by: INTERNAL MEDICINE

## 2022-09-28 PROCEDURE — 25000003 PHARM REV CODE 250: Performed by: INTERNAL MEDICINE

## 2022-09-28 PROCEDURE — 97530 THERAPEUTIC ACTIVITIES: CPT

## 2022-09-28 PROCEDURE — 99900035 HC TECH TIME PER 15 MIN (STAT)

## 2022-09-28 PROCEDURE — 11000004 HC SNF PRIVATE

## 2022-09-28 PROCEDURE — 82962 GLUCOSE BLOOD TEST: CPT

## 2022-09-28 PROCEDURE — 25000003 PHARM REV CODE 250: Performed by: FAMILY MEDICINE

## 2022-09-28 RX ADMIN — METOPROLOL TARTRATE 25 MG: 25 TABLET, FILM COATED ORAL at 09:09

## 2022-09-28 RX ADMIN — ASPIRIN 325 MG ORAL TABLET 325 MG: 325 PILL ORAL at 08:09

## 2022-09-28 RX ADMIN — CELECOXIB 200 MG: 100 CAPSULE ORAL at 09:09

## 2022-09-28 RX ADMIN — LOSARTAN POTASSIUM 100 MG: 100 TABLET, FILM COATED ORAL at 08:09

## 2022-09-28 RX ADMIN — SENNOSIDES AND DOCUSATE SODIUM 1 TABLET: 50; 8.6 TABLET ORAL at 08:09

## 2022-09-28 RX ADMIN — OXYCODONE HYDROCHLORIDE AND ACETAMINOPHEN 1 TABLET: 10; 325 TABLET ORAL at 12:09

## 2022-09-28 RX ADMIN — FAMOTIDINE 20 MG: 20 TABLET, FILM COATED ORAL at 09:09

## 2022-09-28 RX ADMIN — PREGABALIN 75 MG: 75 CAPSULE ORAL at 08:09

## 2022-09-28 RX ADMIN — FAMOTIDINE 20 MG: 20 TABLET, FILM COATED ORAL at 08:09

## 2022-09-28 RX ADMIN — ZOLPIDEM TARTRATE 5 MG: 5 TABLET, COATED ORAL at 09:09

## 2022-09-28 RX ADMIN — PREGABALIN 75 MG: 75 CAPSULE ORAL at 09:09

## 2022-09-28 RX ADMIN — METOPROLOL TARTRATE 25 MG: 25 TABLET, FILM COATED ORAL at 08:09

## 2022-09-28 RX ADMIN — OXYCODONE HYDROCHLORIDE AND ACETAMINOPHEN 1 TABLET: 10; 325 TABLET ORAL at 05:09

## 2022-09-28 RX ADMIN — INSULIN DETEMIR 62 UNITS: 100 INJECTION, SOLUTION SUBCUTANEOUS at 09:09

## 2022-09-28 RX ADMIN — CELECOXIB 200 MG: 100 CAPSULE ORAL at 08:09

## 2022-09-28 RX ADMIN — AMLODIPINE BESYLATE 5 MG: 2.5 TABLET ORAL at 08:09

## 2022-09-28 RX ADMIN — SENNOSIDES AND DOCUSATE SODIUM 1 TABLET: 50; 8.6 TABLET ORAL at 09:09

## 2022-09-28 RX ADMIN — OXYCODONE HYDROCHLORIDE AND ACETAMINOPHEN 1 TABLET: 10; 325 TABLET ORAL at 09:09

## 2022-09-28 NOTE — PT/OT/SLP PROGRESS
"Occupational Therapy  Treatment    Jonna Cunha   MRN: [id number]   Admitting Diagnosis: S/P total knee arthroplasty, left    OT Date of Treatment: 09/28/22   OT Start Time: 0959  OT Stop Time: 1022  OT Total Time (min): 23 min    Billable Minutes:  Therapeutic Exercise 23 min               General Precautions: Standard, fall  Orthopedic Precautions: LLE weight bearing as tolerated  Braces:    Respiratory Status: Room air         Subjective:  Communicated with RN prior to session.    Pain/Comfort  Pain Rating 1: 7/10    Objective:        Functional Mobility:  Bed Mobility:       Transfers:        Functional Ambulation:     Activities of Daily Living:         Therapeutic Activities and Exercises:  Patient completed the following for increased strength to increase I with ADLs: 3# dowel- shoulder press, chest press, bicep curls x 40, yellow theraflex x 40 both ways, yellow theraband- scapular retraction, rows and tricep extension x 40; UBE 7 min      AM-PAC 6 CLICK ADL   How much help from another person does this patient currently need?   1 = Unable, Total/Dependent Assistance  2 = A lot, Maximum/Moderate Assistance  3 = A little, Minimum/Contact Guard/Supervision  4 = None, Modified [place]/Independent    Putting on and taking off regular lower body clothing? : 3  Bathing (including washing, rinsing, drying)?: 3  Toileting, which includes using toilet, bedpan, or urinal? : 4  Putting on and taking off regular upper body clothing?: 4  Taking care of personal grooming such as brushing teeth?: 4  Eating meals?: 4  Daily Activity Total Score: 22     AM-PAC Raw Score CMS "G-Code Modifier Level of Impairment Assistance   6 % Total / Unable   7 - 8 CM 80 - 100% Maximal Assist   9-13 CL 60 - 80% Moderate Assist   14 - 19 CK 40 - 60% Moderate Assist   20 - 22 CJ 20 - 40% Minimal Assist   23 CI 1-20% SBA / CGA   24 CH 0% Independent/ Mod I       Patient left up in chair with call button in " reach    ASSESSMENT:  Jonna Cunha is a 67 y.o. female with a medical diagnosis of S/P total knee arthroplasty, left and presents with decreased strength, decreased endurance, decreased self-care, decreased mobility.    Rehab identified problem list/impairments: Rehab identified problem list/impairments: weakness, impaired endurance, impaired self care skills, impaired functional mobility, impaired balance, decreased lower extremity function, decreased safety awareness    Rehab potential is excellent.    Activity tolerance: Excellent    Discharge recommendations: Discharge Facility/Level of Care Needs: outpatient PT     Barriers to discharge:      Equipment recommendations: walker, rolling     GOALS:   Multidisciplinary Problems       Occupational Therapy Goals          Problem: Occupational Therapy    Goal Priority Disciplines Outcome Interventions   Occupational Therapy Goal     OT, PT/OT Ongoing, Progressing    Description: Description: Grooming Status:   Short Term Goal: Pt will perform grooming with s/u sitting EOB.   Long Term Goal: Pt will perform grooming/oral hygiene standing at sink with Mod I      LE dressing Status:   Short Term Goal: Pt will perform LE dressing with mod a.   Long Term Goal: Pt will perform LE dressing with min a.    Toileting Status:   Short Term Goal: Pt will perform toilet hygiene on BSC with s/u.  Long Term Goal: Pt will perform toilet hygiene on toilet with no AE with Mod I.    Commode Transfer:   Short Term Goal: Pt will perform BSC t/f with s/u.  Long Term Goal:  Pt will perform toilet t/f in bathroom with Mod I.     Bathing Status:   Long Term Goal: Pt will perform sponge bath with s/u with no unsafe fatigue.     Strength Status:   Long Term Goal: Pt to perform BUE strengthening with weights and/or body weight to increase ADL independence and safety    Endurance Status:   Short Term Goal:pt to perform 15 min OT treatment with 5 or greater rest breaks  Long Term Goal: pt to  perform 30 min OT treat with 3 or less rest breaks                         Plan:  Patient to be seen 5 x/week to address the above listed problems via therapeutic exercises  Plan of Care expires:    Plan of Care reviewed with: patient       Negra Rachael, OTR/L    09/28/2022

## 2022-09-28 NOTE — PT/OT/SLP PROGRESS
"Physical Therapy  Treatment    Jonna Cunha   MRN: [id number]   Admitting Diagnosis: S/P total knee arthroplasty, left    PT Received On: 09/28/22  PT Start Time: 14:20  PT Stop Time: 1455    PT Total Time (min): 35 min       Billable Minutes: Ther Act: 27 minutes  Gait: 8 minutes     Treatment Type: Treatment  PT/PTA: PT     PTA visit number: 0     General Precautions: Standard, fall  Orthopedic Precautions: LLE weight bearing as tolerated   Braces: Knee immobilizer  Respiratory Status: Room air    Spiritual, Cultural Beliefs, Jew Practices, Values that Affect Care: no    Subjective:  Patient states "I feel better now." She is agreeable to PT treatment.     Pain/Comfort  Pain Rating 1: 6/10  Location - Side 1: Left  Location 1: knee  Pain Addressed 1: Distraction    Objective:        Functional Mobility:  Bed Mobility: Not performed      Transfers: 2 x 10 Sit<>stand to RW, SBA       Stairs: Not Completed    Gait: Patient ambulated approximately 2 x 150  feet with RW and SBA     Balance:   Static Sit: NORMAL: No deviations seen in posture held statically  Dynamic Sit: NORMAL: No deviations seen in posture held dynamically  Static Stand: FAIR: Maintains without assist but unable to take challenges  Dynamic stand: FAIR: Needs CONTACT GUARD during gait     Therapeutic Activities and Exercises:     TherEx Reps   NuStep  10 minutes    Quad Sets 30 x 3" (not today)   Glute Sets    Ankle pumps    LAQ 3 x 10   Heel Slides 30 x 3"    Seated ADD 30 x 3"    SLRs     Hip ABD  30 x red TB   Seated Marches 3 x 10    Hamstring Curls 20 x red TB (not today)       Ther-Act    Sit <> stands 2 x 10   Mini squats 2 x 10    Heel raises 2 x 10    Marching  2 x 10                AM-PAC 6 CLICK MOBILITY  How much help from another person does this patient currently need?   1 = Unable, Total/Dependent Assistance  2 = A lot, Maximum/Moderate Assistance  3 = A little, Minimum/Contact Guard/Supervision  4 = None, Modified " [place]/Independent    Turning over in bed (including adjusting bedclothes, sheets and blankets)?: 4  Sitting down on and standing up from a chair with arms (e.g., wheelchair, bedside commode, etc.): 4  Moving from lying on back to sitting on the side of the bed?: 3  Moving to and from a bed to a chair (including a wheelchair)?: 4  Need to walk in hospital room?: 4  Climbing 3-5 steps with a railing?: 3  Basic Mobility Total Score: 22    AM-PAC Raw Score CMS G-Code Modifier Level of Impairment Assistance   6 % Total / Unable   7 - 9 CM 80 - 100% Maximal Assist   10 - 14 CL 60 - 80% Moderate Assist   15 - 19 CK 40 - 60% Moderate Assist   20 - 22 CJ 20 - 40% Minimal Assist   23 CI 1-20% SBA / CGA   24 CH 0% Independent/ Mod I         Assessment:  Jonna Cunha is a 67 y.o. female with a medical diagnosis of S/P total knee arthroplasty, left and presents with pain and impaired mobility. Patient required cueing to decrease speed and increase hold time with ther-ex for improved motor control. Patient's treatment shortened due to pt in pain. Patient had no reports of adverse effects to treatment. She is progressing well toward her rehab goals.     Rehab identified problem list/impairments:     Rehab potential is good.    Activity tolerance: Good    Discharge recommendations: Discharge Facility/Level of Care Needs: outpatient PT     Barriers to discharge:      Equipment recommendations: Equipment Needed After Discharge: walker, rolling     GOALS:   Multidisciplinary Problems       Physical Therapy Goals          Problem: Physical Therapy    Goal Priority Disciplines Outcome Goal Variances Interventions   Physical Therapy Goal     PT, PT/OT Ongoing, Progressing     Description: Short Term Goals  1. Patient will complete 30 reps of B LE exercises with correct form.   2. Patient will complete sit<>stand transfers with SBA.  3. Patient will ambulate 100 feet with RW on level surfaces with CGA.     Long Term Goals    1. Patient will ambulate 300 feet with RW on level and unlevel surfaces with SBA.  2. Patient will complete all functional transfers with MOD I.  3. L knee AROM 0-110.   4. Patient will negotiate up and down 5 stairs with use of handrail with SBA.                          PLAN:    Patient to be seen BID  to address the above listed problems via gait training, therapeutic activities, therapeutic exercises  Plan of Care expires: 10/06/22  Plan of Care reviewed with: patient    Continue POC Per PT order to progress patient toward rehab goals as tolerated by patient.      Daria Franco, PT, DPT     9/28/2022

## 2022-09-28 NOTE — PROGRESS NOTES
Wt: 192# noted.  RDN visited pt this a.m. and she denied complaints or requests.  Intake has improved ~75%.  No problems with surgical incision.  B-228 noted. Continue to follow.

## 2022-09-28 NOTE — PLAN OF CARE
[facility]  Medical Surgical Unit - Skilled Nursing Facility  Patient: Jonna Cunha     Interdisciplinary Team Meeting     Today's Date: 9/28/2022     Estimated D/C Date: 10/05/2022       Physician: Dr. Fischer    Pharmacy: [facility] Unit Director: Salomón Clemens, RN, BSN   : Fabby Coelho RN MSN Physical/Occupational Therapy:    Speech Therapy:na    Nursing: Chen Palumbo RN, Elise ZepedaRN Other: Kassie Odom RN, LILLIAN Cr RN, Infection Control     Nurse  New Symptoms/Problems: no  Last BM:  Urine: continent Diarrhea: No   Constipated: No Bladder: continent    Isolation: No     O2: na  Rm Air: 99 %   Nutrition:   Speech/Swallowing: na  Aspiration Precautions: No  Cognition: alert/oriented x3    Physical Therapy  Physical Therapy/Gait: 150ft with rw /sba ELOS: 21 days   Transfers:  Range of Motion/Restrictions:      Occupational Therapy  Occupational Therapy:  Eating/Grooming: independent   Toileting: cga Bathing: cga   Dressing (Upper Body): cga Dressing (Lower Body): min asst       Tx Plan/Recommendations reviewed with patient.  Additional family Conference/Training:   D/C Plan/Recommendations: home with hh/vs outpatient therapy    Pharmacy  Medication Changes (see MD orders in chart): No  MD/NP: Dr. Fischer Labs Reviewed: yes New Lab Orders: no     MD/NP Signature: Time:

## 2022-09-29 LAB
ANION GAP SERPL CALCULATED.3IONS-SCNC: 11 MMOL/L (ref 7–16)
BASOPHILS # BLD AUTO: 0.06 K/UL (ref 0–0.2)
BASOPHILS NFR BLD AUTO: 1 % (ref 0–1)
BUN SERPL-MCNC: 17 MG/DL (ref 7–18)
BUN/CREAT SERPL: 20 (ref 6–20)
CALCIUM SERPL-MCNC: 9.8 MG/DL (ref 8.5–10.1)
CHLORIDE SERPL-SCNC: 108 MMOL/L (ref 98–107)
CO2 SERPL-SCNC: 28 MMOL/L (ref 21–32)
CREAT SERPL-MCNC: 0.87 MG/DL (ref 0.55–1.02)
DIFFERENTIAL METHOD BLD: ABNORMAL
EGFR (NO RACE VARIABLE) (RUSH/TITUS): 74 ML/MIN/1.73M²
EOSINOPHIL # BLD AUTO: 0.51 K/UL (ref 0–0.5)
EOSINOPHIL NFR BLD AUTO: 8.8 % (ref 1–4)
ERYTHROCYTE [DISTWIDTH] IN BLOOD BY AUTOMATED COUNT: 13.4 % (ref 11.5–14.5)
GLUCOSE SERPL-MCNC: 122 MG/DL (ref 74–106)
GLUCOSE SERPL-MCNC: 190 MG/DL (ref 70–105)
HCT VFR BLD AUTO: 30.1 % (ref 38–47)
HGB BLD-MCNC: 9.5 G/DL (ref 12–16)
IMM GRANULOCYTES # BLD AUTO: 0.01 K/UL (ref 0–0.04)
IMM GRANULOCYTES NFR BLD: 0.2 % (ref 0–0.4)
LYMPHOCYTES # BLD AUTO: 2.3 K/UL (ref 1–4.8)
LYMPHOCYTES NFR BLD AUTO: 39.7 % (ref 27–41)
MCH RBC QN AUTO: 28.6 PG (ref 27–31)
MCHC RBC AUTO-ENTMCNC: 31.6 G/DL (ref 32–36)
MCV RBC AUTO: 90.7 FL (ref 80–96)
MONOCYTES # BLD AUTO: 0.32 K/UL (ref 0–0.8)
MONOCYTES NFR BLD AUTO: 5.5 % (ref 2–6)
MPC BLD CALC-MCNC: 9.7 FL (ref 9.4–12.4)
NEUTROPHILS # BLD AUTO: 2.6 K/UL (ref 1.8–7.7)
NEUTROPHILS NFR BLD AUTO: 44.8 % (ref 53–65)
PLATELET # BLD AUTO: 252 K/UL (ref 150–400)
POTASSIUM SERPL-SCNC: 4.2 MMOL/L (ref 3.5–5.1)
RBC # BLD AUTO: 3.32 M/UL (ref 4.2–5.4)
SODIUM SERPL-SCNC: 143 MMOL/L (ref 136–145)
WBC # BLD AUTO: 5.8 K/UL (ref 4.5–11)

## 2022-09-29 PROCEDURE — 25000003 PHARM REV CODE 250: Performed by: INTERNAL MEDICINE

## 2022-09-29 PROCEDURE — 63600175 PHARM REV CODE 636 W HCPCS: Performed by: INTERNAL MEDICINE

## 2022-09-29 PROCEDURE — 11000004 HC SNF PRIVATE

## 2022-09-29 PROCEDURE — 99900035 HC TECH TIME PER 15 MIN (STAT)

## 2022-09-29 PROCEDURE — 85025 COMPLETE CBC W/AUTO DIFF WBC: CPT | Performed by: INTERNAL MEDICINE

## 2022-09-29 PROCEDURE — 25000003 PHARM REV CODE 250: Performed by: FAMILY MEDICINE

## 2022-09-29 PROCEDURE — 80048 BASIC METABOLIC PNL TOTAL CA: CPT | Performed by: INTERNAL MEDICINE

## 2022-09-29 PROCEDURE — 97110 THERAPEUTIC EXERCISES: CPT

## 2022-09-29 PROCEDURE — 82962 GLUCOSE BLOOD TEST: CPT

## 2022-09-29 PROCEDURE — 36415 COLL VENOUS BLD VENIPUNCTURE: CPT | Performed by: INTERNAL MEDICINE

## 2022-09-29 RX ADMIN — METOPROLOL TARTRATE 25 MG: 25 TABLET, FILM COATED ORAL at 08:09

## 2022-09-29 RX ADMIN — OXYCODONE HYDROCHLORIDE AND ACETAMINOPHEN 1 TABLET: 10; 325 TABLET ORAL at 08:09

## 2022-09-29 RX ADMIN — SENNOSIDES AND DOCUSATE SODIUM 1 TABLET: 50; 8.6 TABLET ORAL at 08:09

## 2022-09-29 RX ADMIN — CELECOXIB 200 MG: 100 CAPSULE ORAL at 08:09

## 2022-09-29 RX ADMIN — FAMOTIDINE 20 MG: 20 TABLET, FILM COATED ORAL at 08:09

## 2022-09-29 RX ADMIN — AMLODIPINE BESYLATE 5 MG: 2.5 TABLET ORAL at 08:09

## 2022-09-29 RX ADMIN — LOSARTAN POTASSIUM 100 MG: 100 TABLET, FILM COATED ORAL at 08:09

## 2022-09-29 RX ADMIN — PREGABALIN 75 MG: 75 CAPSULE ORAL at 08:09

## 2022-09-29 RX ADMIN — INSULIN DETEMIR 62 UNITS: 100 INJECTION, SOLUTION SUBCUTANEOUS at 08:09

## 2022-09-29 RX ADMIN — ASPIRIN 325 MG ORAL TABLET 325 MG: 325 PILL ORAL at 08:09

## 2022-09-29 RX ADMIN — ZOLPIDEM TARTRATE 5 MG: 5 TABLET, COATED ORAL at 08:09

## 2022-09-29 RX ADMIN — OXYCODONE HYDROCHLORIDE AND ACETAMINOPHEN 1 TABLET: 10; 325 TABLET ORAL at 01:09

## 2022-09-29 NOTE — PLAN OF CARE
Problem: Physical Therapy  Goal: Physical Therapy Goal  Description: Short Term Goals  1. Patient will complete 30 reps of B LE exercises with correct form.   2. Patient will complete sit<>stand transfers with SBA.  3. Patient will ambulate 100 feet with RW on level surfaces with CGA.     Long Term Goals   1. Patient will ambulate 300 feet with RW on level and unlevel surfaces with SBA.  2. Patient will complete all functional transfers with MOD I.  3. L knee AROM 0-110.   4. Patient will negotiate up and down 5 stairs with use of handrail with SBA.     Outcome: Ongoing, Progressing   Continue POC Per PT order to progress patient toward rehab goals as tolerated by patient.  DANNIE Angel 9/29/2022

## 2022-09-29 NOTE — PLAN OF CARE
Swing Bed Recertification    Patient Name: Jonna Cunha                                                            MRN: [id number]   : 1955   Diagnosis: S/P total knee arthroplasty, left [Z96.652]     I certify that continued skilled inpatient care is necessary for the following reasons: Patient requires continued skilled PT and OT at least five days per week due to the patient's functional deficits. Patient requires continued daily skilled nursing care to meet the patient's medical needs, promote recovery, and ensure medical safety.     Estimated discharge date: 10/5/2022

## 2022-09-29 NOTE — PT/OT/SLP PROGRESS
"Physical Therapy  Treatment    Jonna Cunha   MRN: [id number]   Admitting Diagnosis: S/P total knee arthroplasty, left    PT Received On: 09/29/22  PT Start Time: 10:12  PT Stop Time: 1250    PT Total Time (min): 40 min    Billable Minutes: Gait 8, Ther Ex 12, Ther Act 20     Treatment Type: Treatment  PT/PTA: PTA     PTA visit number: 1    General Precautions: Standard, fall  Orthopedic Precautions: LLE weight bearing as tolerated   Braces:    Respiratory Status: Room air    Spiritual, Cultural Beliefs, Church Practices, Values that Affect Care: no    Subjective:  Patient states "I'm hurting some today.  I didn't take a pain pill.  I'm trying to hold out for a while".     Pain/Comfort  Pain Rating 1: 7/10  Location - Side 1: Left  Location - Orientation 1: generalized  Location 1: knee  Pain Addressed 1: Cessation of Activity    Objective:        Functional Mobility:  Bed Mobility: sit to supine transfer with SBA       Transfers: sit<>stand with SBA to RW       Stairs: 6 trials, varying step height, bilateral handrails     Gait: Patient ambulated approximately 300'  feet with RW and SBA     Balance:   Static Sit: NORMAL: No deviations seen in posture held statically  Dynamic Sit: NORMAL: No deviations seen in posture held dynamically  Static Stand: FAIR: Maintains without assist but unable to take challenges  Dynamic stand: FAIR: Needs CONTACT GUARD during gait     Therapeutic Activities and Exercises:     TherEx Reps   NuStep  10 minutes    Quad Sets    Glute Sets    Ankle pumps    LAQ 3 x 10   Heel Slides 30 x 3"    Seated ADD    SLRs     Hip ABD     Seated Marches    Hamstring Curls        Ther-Act    Sit <> stands 2 x 10    Mini squats 2 x 10    Heel raises 30 x    Standing hip abduction  2 x 10    Standing hip extension  2 x 10    Standing marching, bilateral  3 x 10        AM-PAC 6 CLICK MOBILITY  How much help from another person does this patient currently need?   1 = Unable, Total/Dependent " Assistance  2 = A lot, Maximum/Moderate Assistance  3 = A little, Minimum/Contact Guard/Supervision  4 = None, Modified [place]/Independent    Turning over in bed (including adjusting bedclothes, sheets and blankets)?: 4  Sitting down on and standing up from a chair with arms (e.g., wheelchair, bedside commode, etc.): 4  Moving from lying on back to sitting on the side of the bed?: 4  Moving to and from a bed to a chair (including a wheelchair)?: 4  Need to walk in hospital room?: 4  Climbing 3-5 steps with a railing?: 3  Basic Mobility Total Score: 23    AM-PAC Raw Score CMS G-Code Modifier Level of Impairment Assistance   6 % Total / Unable   7 - 9 CM 80 - 100% Maximal Assist   10 - 14 CL 60 - 80% Moderate Assist   15 - 19 CK 40 - 60% Moderate Assist   20 - 22 CJ 20 - 40% Minimal Assist   23 CI 1-20% SBA / CGA   24 CH 0% Independent/ Mod I         Assessment:  Jonna Cunha is a 67 y.o. female with a medical diagnosis of S/P total knee arthroplasty, left and presents with pain and impaired mobility. Patient requires mod verbal and visual cues to progress through completion of Ther Act with proper alignment, posture, count, and hold times.  Minimal compensation noted to compensate for pain off  Left LE.  Left LE AROM knee flexion 108 degrees at end of treatment with patient short sitting.  No adverse effects noted or reported.       Rehab identified problem list/impairments:     Rehab potential is good.    Activity tolerance: Good    Discharge recommendations: Discharge Facility/Level of Care Needs: outpatient PT     Barriers to discharge:      Equipment recommendations: Equipment Needed After Discharge: walker, rolling     GOALS:   Multidisciplinary Problems       Physical Therapy Goals          Problem: Physical Therapy    Goal Priority Disciplines Outcome Goal Variances Interventions   Physical Therapy Goal     PT, PT/OT Ongoing, Progressing     Description: Short Term Goals  1. Patient will  complete 30 reps of B LE exercises with correct form.   2. Patient will complete sit<>stand transfers with SBA.  3. Patient will ambulate 100 feet with RW on level surfaces with CGA.     Long Term Goals   1. Patient will ambulate 300 feet with RW on level and unlevel surfaces with SBA.  2. Patient will complete all functional transfers with MOD I.  3. L knee AROM 0-110.   4. Patient will negotiate up and down 5 stairs with use of handrail with SBA.                          PLAN:    Patient to be seen BID  to address the above listed problems via gait training, therapeutic activities, therapeutic exercises  Plan of Care expires: 10/06/22  Plan of Care reviewed with: patient    Continue POC Per PT order to progress patient toward rehab goals as tolerated by patient.      DANNIE Angel       9/29/2022

## 2022-09-29 NOTE — NURSING
Called to room by patient. Patient states she is wanting to be discharged home tomorrow following her appointment. Instructed patient I would insure information was passed to day shift tomorrow.

## 2022-09-29 NOTE — PT/OT/SLP PROGRESS
"Physical Therapy  Treatment    Jonna Cunha   MRN: [id number]   Admitting Diagnosis: S/P total knee arthroplasty, left    PT Received On: 09/29/22  PT Start Time: 10:12  PT Stop Time: 1045    PT Total Time (min): 33 min       Billable Minutes: Ther Act: 25 minutes  Gait: 8 minutes     Treatment Type: Treatment  PT/PTA: PT     PTA visit number: 0     General Precautions: Standard, fall  Orthopedic Precautions: LLE weight bearing as tolerated   Braces:    Respiratory Status: Room air    Spiritual, Cultural Beliefs, Gnosticist Practices, Values that Affect Care: no    Subjective:  Patient states "It's a little sore on the outside of my knee."     Pain/Comfort  Pain Rating 1: 4/10  Location - Side 1: Left  Location - Orientation 1: generalized  Location 1: knee  Pain Addressed 1: Distraction    Objective:        Functional Mobility:  Bed Mobility: sit to supine transfer with SBA       Transfers: sit<>stand with SBA to RW       Stairs: Not Completed    Gait: Patient ambulated approximately 2 x 150  feet with RW and SBA     Balance:   Static Sit: NORMAL: No deviations seen in posture held statically  Dynamic Sit: NORMAL: No deviations seen in posture held dynamically  Static Stand: FAIR: Maintains without assist but unable to take challenges  Dynamic stand: FAIR: Needs CONTACT GUARD during gait     Therapeutic Activities and Exercises:     TherEx Reps   NuStep  10 minutes    Quad Sets    Glute Sets    Ankle pumps    LAQ 3 x 10   Heel Slides 30 x 3"    Seated ADD 30 x 3"    SLRs     Hip ABD  30 x red TB   Seated Marches 3 x 10    Hamstring Curls 20 x red TB        Ther-Act    Sit <> stands    Mini squats    Heel raises                    AM-PAC 6 CLICK MOBILITY  How much help from another person does this patient currently need?   1 = Unable, Total/Dependent Assistance  2 = A lot, Maximum/Moderate Assistance  3 = A little, Minimum/Contact Guard/Supervision  4 = None, Modified [place]/Independent    Turning over in bed " (including adjusting bedclothes, sheets and blankets)?: 4  Sitting down on and standing up from a chair with arms (e.g., wheelchair, bedside commode, etc.): 4  Moving from lying on back to sitting on the side of the bed?: 4  Moving to and from a bed to a chair (including a wheelchair)?: 4  Need to walk in hospital room?: 4  Climbing 3-5 steps with a railing?: 3  Basic Mobility Total Score: 23    AM-PAC Raw Score CMS G-Code Modifier Level of Impairment Assistance   6 % Total / Unable   7 - 9 CM 80 - 100% Maximal Assist   10 - 14 CL 60 - 80% Moderate Assist   15 - 19 CK 40 - 60% Moderate Assist   20 - 22 CJ 20 - 40% Minimal Assist   23 CI 1-20% SBA / CGA   24 CH 0% Independent/ Mod I         Assessment:  Jonna Cunha is a 67 y.o. female with a medical diagnosis of S/P total knee arthroplasty, left and presents with pain and impaired mobility. Patient required cueing occasional cues to decrease speed of exercise for improved form. Patient achieved 104 degrees of L knee flexion in seated position. Patient able to complete bed mobility tasks independently today.     Rehab identified problem list/impairments:     Rehab potential is good.    Activity tolerance: Good    Discharge recommendations: Discharge Facility/Level of Care Needs: outpatient PT     Barriers to discharge:      Equipment recommendations: Equipment Needed After Discharge: walker, rolling     GOALS:   Multidisciplinary Problems       Physical Therapy Goals          Problem: Physical Therapy    Goal Priority Disciplines Outcome Goal Variances Interventions   Physical Therapy Goal     PT, PT/OT Ongoing, Progressing     Description: Short Term Goals  1. Patient will complete 30 reps of B LE exercises with correct form.   2. Patient will complete sit<>stand transfers with SBA.  3. Patient will ambulate 100 feet with RW on level surfaces with CGA.     Long Term Goals   1. Patient will ambulate 300 feet with RW on level and unlevel surfaces with  SBA.  2. Patient will complete all functional transfers with MOD I.  3. L knee AROM 0-110.   4. Patient will negotiate up and down 5 stairs with use of handrail with SBA.                          PLAN:    Patient to be seen BID  to address the above listed problems via gait training, therapeutic exercises  Plan of Care expires: 10/06/22  Plan of Care reviewed with: patient    Continue POC Per PT order to progress patient toward rehab goals as tolerated by patient.      Nelda Silva, PT, DPT       9/29/2022

## 2022-09-30 ENCOUNTER — OFFICE VISIT (OUTPATIENT)
Dept: ORTHOPEDICS | Facility: CLINIC | Age: 67
End: 2022-09-30
Payer: MEDICARE

## 2022-09-30 VITALS
BODY MASS INDEX: 32.1 KG/M2 | DIASTOLIC BLOOD PRESSURE: 59 MMHG | RESPIRATION RATE: 18 BRPM | TEMPERATURE: 98 F | HEIGHT: 65 IN | WEIGHT: 192.69 LBS | OXYGEN SATURATION: 98 % | HEART RATE: 68 BPM | SYSTOLIC BLOOD PRESSURE: 139 MMHG

## 2022-09-30 DIAGNOSIS — Z96.652 S/P TOTAL KNEE REPLACEMENT, LEFT: Primary | ICD-10-CM

## 2022-09-30 PROCEDURE — 99024 POSTOP FOLLOW-UP VISIT: CPT | Mod: ,,, | Performed by: NURSE PRACTITIONER

## 2022-09-30 PROCEDURE — 99315 PR NURSING FAC DISCHRGE DAY,1-30 MIN: ICD-10-PCS | Mod: ,,, | Performed by: FAMILY MEDICINE

## 2022-09-30 PROCEDURE — 99024 PR POST-OP FOLLOW-UP VISIT: ICD-10-PCS | Mod: ,,, | Performed by: NURSE PRACTITIONER

## 2022-09-30 PROCEDURE — 99900035 HC TECH TIME PER 15 MIN (STAT)

## 2022-09-30 PROCEDURE — 99315 NF DSCHRG MGMT 30 MIN/LESS: CPT | Mod: ,,, | Performed by: FAMILY MEDICINE

## 2022-09-30 RX ORDER — OXYCODONE AND ACETAMINOPHEN 10; 325 MG/1; MG/1
1 TABLET ORAL EVERY 6 HOURS PRN
Qty: 30 TABLET | Refills: 0 | Status: SHIPPED | OUTPATIENT
Start: 2022-09-30

## 2022-09-30 RX ORDER — ORAL SEMAGLUTIDE 7 MG/1
7 TABLET ORAL EVERY MORNING
COMMUNITY
Start: 2022-09-06

## 2022-09-30 NOTE — PROGRESS NOTES
Post-Operative Total Knee        No surgery found No surgery found      Pt is here today for First post-operative followup of her No surgery found.  she is doing well.  We have reviewed her findings and discussed plan of care and future treatment options, including the physical therapy plan.      Patient is 2 weeks post op, doing well. Status post left TKA. She has been at  and would like to discharge today if we agree. Stapled removed, incision looks good. She reacted to adhesives and would like to avoid steri strips if possible.   Physical Exam:     The affected knee was inspected today.   The wound is healing well with no signs of erythema or warmth.  There is no drainage.  No clinical signs or symptoms of infection are present.   ROM: 2-95  -Cristobal's sign.  2+ pulses are present.         Assessment and Plan  1. S/P total knee replacement, left  - Ambulatory referral/consult to Physical/Occupational Therapy; Future            We will continue post-operative physical therapy until the patient feels that they are independent with a home rehab program.  Return to work status/ return to activities status was discussed today in detail. All questions were answered.    The use of stockings and DVT prophylaxis was discussed.  Will follow up in additional 4-6 weeks with radiographs at that time.   RTC 4 weeks with Dr matias Koo  Set up OP PT at [facility]  Ok to discharge from     There are no Patient Instructions on file for this visit.

## 2022-10-03 ENCOUNTER — CLINICAL SUPPORT (OUTPATIENT)
Dept: REHABILITATION | Facility: HOSPITAL | Age: 67
End: 2022-10-03
Payer: MEDICARE

## 2022-10-03 ENCOUNTER — TELEPHONE (OUTPATIENT)
Dept: PRIMARY CARE CLINIC | Facility: CLINIC | Age: 67
End: 2022-10-03
Payer: MEDICARE

## 2022-10-03 DIAGNOSIS — R52 PAIN: Primary | ICD-10-CM

## 2022-10-03 DIAGNOSIS — Z96.652 S/P TOTAL KNEE REPLACEMENT, LEFT: ICD-10-CM

## 2022-10-03 PROCEDURE — 97161 PT EVAL LOW COMPLEX 20 MIN: CPT

## 2022-10-03 PROCEDURE — 97110 THERAPEUTIC EXERCISES: CPT

## 2022-10-03 PROCEDURE — 97016 VASOPNEUMATIC DEVICE THERAPY: CPT

## 2022-10-03 NOTE — PLAN OF CARE
OUTPATIENT THERAPY  Physical Therapy Initial Evaluation    Name: Jonna Cunha  Clinic Number: [id number]    Therapy Diagnosis: No diagnosis found.  Physician: Merced Stanton FNP    Physician Orders: PT Eval and Treat   Medical Diagnosis from Referral: s/p L TKA   Evaluation Date: 10/3/2022  Authorization Period Expiration: 10/3/2023  Plan of Care Expiration: 2022  Visit # / Visits authorized:     Time In: 12:00  Time Out: 13:00  Total Appointment Time (timed & untimed codes): 60 minutes    Precautions: Standard    Subjective   Date of onset: 2022  History of current condition - Jonna reports to PT s/p L TKA on 2022. Patient completed swing bed rehabilitation at this hospital following surgery. Since discharging home on 2022, patient reports that she has had increased pain with prolonged standing or walking. Patient reports to PT today with no AD and states that she has been walking at home with no AD.      Medical History:   Past Medical History:   Diagnosis Date    Arthritis     Diabetes mellitus, type 2     Hypertension     Thyroid disease        Surgical History:   Jonna Cunha  has a past surgical history that includes Cholecystectomy;  section; Spine surgery; Tubal ligation; Knee arthroscopy w/ meniscectomy (Left, 2021); and Repair of meniscus of knee (Left, 2021).    Medications:   Jonna has a current medication list which includes the following prescription(s): acetaminophen, amlodipine, aspirin, celecoxib, famotidine, lantus solostar u-100 insulin, nebivolol, olmesartan, ondansetron, ondansetron, oxycodone-acetaminophen, polyethylene glycol, rybelsus, and senna-docusate 8.6-50 mg.    Allergies:   Review of patient's allergies indicates:   Allergen Reactions    Corticosteroids (glucocorticoids)         Imaging, X-rays     Prior Therapy: swingbed rehab at this hospital following surgery   Social History:  lives with their spouse  Occupation: Retired  Prior  "Level of Function: independent   Current Level of Function: modified independent     Pain:  Current 6/10, worst 8/10, best 4/10   Location: left knee   Description: Throbbing  Aggravating Factors: Standing, Bending, and Walking  Easing Factors: pain medication and ice    Pts goals: "to get well and not hurt"    Objective     Incisions: Patient's incision appears to be healing well. There are no signs of infection or drainage.     Girth Measurements: Right 39 cm at midpatella Left 44 cm at midpatella    Range of motion:  Motion Right Left    Knee extension  WITHIN FUNCTIONAL LIMITS  0 degrees    Knee flexion  WITHIN FUNCTIONAL LIMITS  96 degrees       Manual muscle test   Muscle Right  Left    Hip flexion  MMT strength: 4+/5  MMT strength: 3+/5   Hip extension  MMT strength: 4+/5  MMT strength: 3+/5   Hip abduction  MMT strength: 4+/5  MMT strength: 3+/5   Hip adduction  MMT strength: 4+/5  MMT strength: 3+/5   Knee extension  MMT strength: 4+/5  MMT strength: 2-/5   Knee flexion  MMT strength: 4+/5  MMT strength: 2-/5       Gait:  Weight bearing precautions: WBAT  Assistive device: none  Ambulation deviations:decreased L stance time, decreased L heel strike, decreased L knee extension during stance     Clinical Special Tests:  5 x sit<>stand: Unable to complete with no Ues  Gait Speed: 2.407 feet per second with no AD     TREATMENT   Treatment Time In: 12:08  Treatment Time Out: 13:00  Total Treatment time (time-based codes) separate from Evaluation: 52 minutes    Jonna received the treatments listed below:  THERAPEUTIC EXERCISES to develop strength, endurance, ROM, and flexibility for 37 minutes including : See flowsheet below     Ther- Ex Reps   Nustep 10 minutes NuStep    Wedge 2 minutes    Hamstring Stretch 3 x 30"    Heelslides (seated) 30 x    LAQ's 2 x 10 3"    Hamstring Curls 2 x 10 green TB    Heel Slides (supine)  30 x    Quad sets  30 x 3"    SAQ's 20 x 3"    Straight Leg Raises 2 x 10    Sidelying hip " ABD  2 x 10                Ther-Act    Heel Raises    Mini Squats    3 way hip    Sit <> stands     Forward Step ups    Lateral Step ups                            GAIT TRAINING to improve functional mobility and safety for 5 minutes. PT completed gait training with SC on level surface with cues for proper sequencing.     SUPERVISED MODALITIES: Vasopneumatic device applied to L knee with elevation above heart level for 10 minutes on low pressure.     Home Exercises and Patient Education Provided          Education provided:   - use of ice and elevation above heart level for management of edema    Written Home Exercises Provided: yes.  Exercises were reviewed and Jonna was able to demonstrate them prior to the end of the session.  Jonna demonstrated good  understanding of the education provided.     See EMR under Patient Instructions for exercises provided 10/3/2022.    Assessment   Jonna is a 67 y.o. female referred to outpatient Physical Therapy with a medical diagnosis of L TKA. Pt presents with decreased L knee ROM, LLE muscle weakness, L knee pain, and impaired mobility. Patient's impairments have resulted in decreased activity tolerance and functional mobility at this time.     PT provided patient with visual, verbal and tactile cueing throughout treatment session for proper LE alignment, form, hold times and decreased compensations with exercises. Patient demonstrated good carryover of gait training and provided return demonstration with correct form. Patient had no reports of adverse effects to treatment.     Pt prognosis is Good.   Pt will benefit from skilled outpatient Physical Therapy to address the deficits stated above and in the chart below, provide pt/family education, and to maximize pt's level of independence.     Plan of care discussed with patient: Yes  Pt's spiritual, cultural and educational needs considered and patient is agreeable to the plan of care and goals as stated below:      Anticipated Barriers for therapy: compliance with HEP and treatment.     Goals: (to be met by end of POC)  Pt will increase L knee flexion to 120, knee extension to 0 degrees, and quad lag to 0 degrees to allow patient normal gait pattern.  Pt will increase L knee strength to 4/5 to allow patient to safely return to prior level of function.   Pt will report decrease in pain to 3/10 to improve quality of life.  Plan   Plan of care Certification: 10/3/2022 to 11/18/2022.    Outpatient Physical Therapy 2 times weekly for 6 weeks to include the following interventions: Electrical Stimulation unattend, Gait Training, Moist Heat/ Ice, Patient Education, Therapeutic Activities, and Therapeutic Exercise.     Nelda Silva, PT, DPT

## 2022-10-04 NOTE — TELEPHONE ENCOUNTER
Spoke with pt for [facility] call. Pt reports her PCP is Dr Dahiana Quintero. She states she will schedule a follow up appt with her own PCP.

## 2022-10-05 NOTE — DISCHARGE SUMMARY
[facility]  Medical Surgical Unit  Hospital Medicine  Discharge Summary      Patient Name: Jonna Cunha  MRN: [id number]  Patient Class: IP- Swing  Admission Date: 9/15/2022  Hospital Length of Stay: 15 days  Discharge Date and Time: 9/30/2022 11:30 AM  Attending Physician: No att. providers found   Discharging Provider: Abimbola Fischer MD  Primary Care Provider: Dahiana Quintero MD      HPI:   No notes on file    * No surgery found *      Hospital Course:   9/16/22 - pt. Staying in pain. Was not on narcotics before surgery.    9/19/22 - feels tired today. Had a hypoglycemic episode this weekend. Will continue present treatment.    9/23/22 - doing well. Voices no complaints.    9/26/22 - doing well. No complaints voiced.       Goals of Care Treatment Preferences:  Code Status: Full Code      Consults:   Consults (From admission, onward)        Status Ordering Provider     Inpatient consult to Registered Dietitian/Nutritionist  Once        Provider:  (Not yet assigned)    CALLI Barcenas          No new Assessment & Plan notes have been filed under this hospital service since the last note was generated.  Service: Hospital Medicine    Final Active Diagnoses:    Diagnosis Date Noted POA    PRINCIPAL PROBLEM:  S/P total knee arthroplasty, left [Z96.652] 09/16/2022 Not Applicable    Acute meniscal tear of left knee [S83.207A] 09/03/2021 Yes      Problems Resolved During this Admission:       Discharged Condition: stable    Disposition: Home or Self Care    Follow Up:   Follow-up Information     Abimbola Fischer MD Follow up.    Specialty: Family Medicine  Why: pt to call for f/u appt. clinic closed  Contact information:  [facility] [facility] [id number] [phone]                       Patient Instructions:   No discharge procedures on file.    Significant Diagnostic Studies: Labs: All labs within the past 24 hours have been reviewed    Pending Diagnostic Studies:     None          Medications:  Reconciled Home Medications:      Medication List      ASK your doctor about these medications    acetaminophen 325 MG tablet  Commonly known as: TYLENOL  Take 650 mg by mouth every 4 (four) hours as needed for Pain.     amLODIPine 5 MG tablet  Commonly known as: NORVASC  TAKE 1 TABLET (5 MG TOTAL) BY MOUTH ONCE DAILY FOR BLOOD PRESSURE.     aspirin 325 MG tablet  Take 325 mg by mouth once daily.  Ask about: Which instructions should I use?     celecoxib 200 MG capsule  Commonly known as: CeleBREX  Take 1 capsule (200 mg total) by mouth 2 (two) times daily.     famotidine 20 MG tablet  Commonly known as: PEPCID  Take 20 mg by mouth 2 (two) times daily.     LANTUS SOLOSTAR U-100 INSULIN glargine 100 units/mL SubQ pen  Generic drug: insulin  62 Units by abdominal subcutaneous route every evening.  Ask about: Which instructions should I use?     nebivoloL 5 MG Tab  Commonly known as: BYSTOLIC  Take 10 mg by mouth once daily.     olmesartan 40 MG tablet  Commonly known as: BENICAR  Take 40 mg by mouth once daily.     ondansetron 4 MG Tbdl  Commonly known as: ZOFRAN-ODT  Take 2 tablets (8 mg total) by mouth every 8 (eight) hours as needed (Nausea).     ondansetron 8 MG tablet  Commonly known as: ZOFRAN  Take 8 mg by mouth every 8 (eight) hours as needed for Nausea.     oxyCODONE-acetaminophen  mg per tablet  Commonly known as: PERCOCET  Take 1 tablet by mouth every 6 (six) hours as needed for Pain.     polyethylene glycol 17 gram Pwpk  Commonly known as: GLYCOLAX  Take 17 g by mouth once daily.     RYBELSUS 7 mg tablet  Generic drug: semaglutide  Take 7 mg by mouth every morning.  Ask about: Which instructions should I use?     senna-docusate 8.6-50 mg 8.6-50 mg per tablet  Commonly known as: PERICOLACE  Take 1 tablet by mouth 2 (two) times daily.            Indwelling Lines/Drains at time of discharge:   Lines/Drains/Airways     None                 Time spent on the discharge of patient: 30  minutes         Abimbola Fischer MD  Department of Hospital Medicine  [facility] - Medical Surgical Unit

## 2022-10-06 ENCOUNTER — CLINICAL SUPPORT (OUTPATIENT)
Dept: REHABILITATION | Facility: HOSPITAL | Age: 67
End: 2022-10-06
Payer: MEDICARE

## 2022-10-06 PROCEDURE — 97110 THERAPEUTIC EXERCISES: CPT | Mod: CQ

## 2022-10-06 PROCEDURE — 97530 THERAPEUTIC ACTIVITIES: CPT | Mod: CQ

## 2022-10-06 NOTE — PROGRESS NOTES
"[facility]   Physical Therapy Treatment Note     Name: Jonna Cunha  Clinic Number: [id number]  Therapy Diagnosis: No diagnosis found.  Physician: Merced Stanton FNP     Physician Orders: PT Eval and Treat   Medical Diagnosis from Referral: s/p L TKA   Evaluation Date: 10/3/2022  Authorization Period Expiration: 10/3/2023  Plan of Care Expiration: 11/18/2022  Visit # / Visits authorized: 2/12     Time In: 9:53  Time Out: 10:47  Total Appointment Time (timed & untimed codes): 55 minutes      Precautions: Standard  Visit Date: 10/6/2022    PTA Visit #: 1/5       SUBJECTIVE     Pt reports:  To physical therapy treatment session ambulating with SC.  Patient reports soreness in L knee.     She was compliant with home exercise program.  Response to previous treatment: Post treatment soreness   Functional change: Early in POC     Pain: 4/10  Location: left knee, LE      OBJECTIVE     Objective Measures updated at progress report unless specified.     Treatment     Jonna received the treatments listed below:      THERAPEUTIC EXERCISES to develop strength, endurance, ROM, and flexibility for 37 minutes including : See flowsheet below      Ther- Ex Reps   Nustep 10 minutes NuStep    Wedge 2 minutes    Hamstring Stretch 3 x 30"    Heelslides (seated) 30 x (109 degrees)    LAQ's 3 x 10 3" *   Hamstring Curls 2 x 10 green TB    Heel Slides (supine)  30 x (110 degrees)   Quad sets  30 x 3" (0 degrees)    SAQ's 20 x 3"     Straight Leg Raises 2 x 10    Sidelying hip ABD  2 x 10            THERAPEUTIC ACTIVITIES to improve functional and dynamic performance.  See flow-sheet below:     Ther-Act     Heel Raises 2 x 10    Mini Squats 2 x 10    3 way hip ------------------   Sit <> stands  10 x    Forward Step ups 10 x    Lateral Step ups 10 x    Standing marching  2 x 10                                SUPERVISED MODALITIES: Vasopneumatic device applied to L knee with elevation above heart level for 10 " minutes on low pressure.          Patient Education and Home Exercises     Home Exercises Provided and Patient Education Provided     Education provided:   - POC, HEP, DOMS     Written Home Exercises Provided: Patient instructed to cont prior HEP. Exercises were reviewed and Jonna was able to demonstrate them prior to the end of the session.  Jonna demonstrated good  understanding of the education provided. See EMR under Patient Instructions for exercises provided during therapy sessions    ASSESSMENT     Jonna is a 67 y.o. female referred to outpatient Physical Therapy with a medical diagnosis of L TKA. Pt presents with decreased L knee ROM, LLE muscle weakness, L knee pain, and impaired mobility.  Patient requires verbal and visual cues to progress through Ther Ex and Ther Act with proper alignment, body mechanics, speed of movement, count, and hold times.  Patient requires frequent rest breaks throughout treatment session secondary to reports of fatigue.  No adverse effects noted during treatment session.     Jonna Is progressing well towards her goals.   Pt prognosis is Good.     Pt will continue to benefit from skilled outpatient physical therapy to address the deficits listed in the problem list box on initial evaluation, provide pt/family education and to maximize pt's level of independence in the home and community environment.     Pt's spiritual, cultural and educational needs considered and pt agreeable to plan of care and goals.     Anticipated barriers to physical therapy: Compliance with HEP and treatment     Goals: (to be met by end of POC)  Pt will increase L knee flexion to 120, knee extension to 0 degrees, and quad lag to 0 degrees to allow patient normal gait pattern.  Pt will increase L knee strength to 4/5 to allow patient to safely return to prior level of function.   Pt will report decrease in pain to 3/10 to improve quality of life.    PLAN     Plan of care Certification: 10/3/2022 to  11/18/2022.     Outpatient Physical Therapy 2 times weekly for 6 weeks to include the following interventions: Electrical Stimulation unattend, Gait Training, Moist Heat/ Ice, Patient Education, Therapeutic Activities, and Therapeutic Exercise.    Continue POC Per PT order to progress patient toward rehab goals.    Leigh Quinteros, PTA  10/6/2022

## 2022-10-11 ENCOUNTER — CLINICAL SUPPORT (OUTPATIENT)
Dept: REHABILITATION | Facility: HOSPITAL | Age: 67
End: 2022-10-11
Payer: MEDICARE

## 2022-10-11 DIAGNOSIS — M25.562 ACUTE PAIN OF LEFT KNEE: Primary | ICD-10-CM

## 2022-10-11 PROCEDURE — 97110 THERAPEUTIC EXERCISES: CPT | Mod: CQ

## 2022-10-11 PROCEDURE — 97530 THERAPEUTIC ACTIVITIES: CPT | Mod: CQ

## 2022-10-11 NOTE — PROGRESS NOTES
"[facility]   Physical Therapy Treatment Note     Name: Jonna Cunha  Clinic Number: [id number]  Therapy Diagnosis: No diagnosis found.  Physician: Merced Stanton FNP     Physician Orders: PT Eval and Treat   Medical Diagnosis from Referral: s/p L TKA   Evaluation Date: 10/3/2022  Authorization Period Expiration: 10/3/2023  Plan of Care Expiration: 11/18/2022  Visit # / Visits authorized: 3/12     Time In: 9:36  Time Out: 10:33  Total Appointment Time (timed & untimed codes): 57 minutes      Precautions: Standard  Visit Date: 10/11/2022    PTA Visit #: 2/5       SUBJECTIVE     Pt reports:  "My knee hurts and it's stiff. It's hard to sleep on."     She was compliant with home exercise program.  Response to previous treatment: Post treatment soreness   Functional change: Early in POC     Pain: 7/10  Location: left knee, LE      OBJECTIVE     Objective Measures updated at progress report unless specified.     Treatment     Jonna received the treatments listed below:      THERAPEUTIC EXERCISES to develop strength, endurance, ROM, and flexibility: See flowsheet below      Ther- Ex Reps   Nustep 10 minutes NuStep    Wedge 2 minutes    Hamstring Stretch 3 x 30"    Heelslides (seated) 30 x (109 degrees)    LAQ's 3 x 10 3" *   Hamstring Curls 2 x 10 green TB    Heel Slides (supine)  30 x (90 degrees)   Quad sets  30 x 3" (0 degrees)    SAQ's 20 x 3"  (Not Completed)   Straight Leg Raises 2 x 10    Sidelying hip ABD  2 x 10            THERAPEUTIC ACTIVITIES to improve functional and dynamic performance.  See flow-sheet below: Increased repetitions on FSU and LSU, added 3 way hip    Ther-Act     Heel Raises 2 x 10    Mini Squats 2 x 10    3 way hip 10 x each   Sit <> stands  15 x    Forward Step ups 20 x    Lateral Step ups 20 x    Standing marching  2 x 10                                SUPERVISED MODALITIES: Vasopneumatic device applied to L knee with elevation above heart level for 10 " minutes on low pressure.          Patient Education and Home Exercises     Home Exercises Provided and Patient Education Provided     Education provided:   - POC, HEP, DOMS     Written Home Exercises Provided: Patient instructed to cont prior HEP. Exercises were reviewed and Jonna was able to demonstrate them prior to the end of the session.  Jonna demonstrated good  understanding of the education provided. See EMR under Patient Instructions for exercises provided during therapy sessions    ASSESSMENT     Jonna is a 67 y.o. female referred to outpatient Physical Therapy with a medical diagnosis of L TKA. Pt presents with decreased L knee ROM, LLE muscle weakness, L knee pain, and impaired mobility. Pt displayed increased recall of previous tx throughout today's tx. Pt required verbal and visual cues to progress through Ther Ex and Ther Act with proper alignment, body mechanics, speed of movement, count, and hold times.  Pt had some increased swelling in left knee which decreased ROM during supine heel slides. Pt required less frequent rest breaks throughout tx session. No adverse effect or c/o pain noted throughout tx.     Jonna Is progressing well towards her goals.   Pt prognosis is Good.     Pt will continue to benefit from skilled outpatient physical therapy to address the deficits listed in the problem list box on initial evaluation, provide pt/family education and to maximize pt's level of independence in the home and community environment.     Pt's spiritual, cultural and educational needs considered and pt agreeable to plan of care and goals.     Anticipated barriers to physical therapy: Compliance with HEP and treatment     Goals: (to be met by end of POC)  Pt will increase L knee flexion to 120, knee extension to 0 degrees, and quad lag to 0 degrees to allow patient normal gait pattern.  Pt will increase L knee strength to 4/5 to allow patient to safely return to prior level of function.   Pt will  report decrease in pain to 3/10 to improve quality of life.    PLAN     Plan of care Certification: 10/3/2022 to 11/18/2022.     Outpatient Physical Therapy 2 times weekly for 6 weeks to include the following interventions: Electrical Stimulation unattend, Gait Training, Moist Heat/ Ice, Patient Education, Therapeutic Activities, and Therapeutic Exercise.    Continue POC Per PT order to progress patient toward rehab goals.    DANNIE Cruz  10/11/2022

## 2022-10-13 ENCOUNTER — CLINICAL SUPPORT (OUTPATIENT)
Dept: REHABILITATION | Facility: HOSPITAL | Age: 67
End: 2022-10-13
Payer: MEDICARE

## 2022-10-13 PROCEDURE — 97110 THERAPEUTIC EXERCISES: CPT | Mod: CQ

## 2022-10-13 PROCEDURE — 97530 THERAPEUTIC ACTIVITIES: CPT | Mod: CQ

## 2022-10-13 NOTE — PROGRESS NOTES
"[facility]   Physical Therapy Treatment Note     Name: Jonna Cunha  Clinic Number: [id number]  Therapy Diagnosis: No diagnosis found.  Physician: Merced Stanton FNP     Physician Orders: PT Eval and Treat   Medical Diagnosis from Referral: s/p L TKA   Evaluation Date: 10/3/2022  Authorization Period Expiration: 10/3/2023  Plan of Care Expiration: 11/18/2022  Visit # / Visits authorized: 4/12     Time In: 9:37  Time Out: 10:34  Total Appointment Time (timed & untimed codes):  57 minutes      Precautions: Standard  Visit Date: 10/13/2022    PTA Visit #: 3/5      SUBJECTIVE     Pt reports: "My knee is just achy and sore, and it's so hard to get it comfortable once it starts hurting".  Patient reports she continues to rest and apply CP as needed to decrease pain and edema at home.     She was compliant with home exercise program.  Response to previous treatment: Post treatment soreness   Functional change: Early in POC     Pain: 5/10  Location: left knee, LE      OBJECTIVE     Objective Measures updated at progress report unless specified.     Treatment     Jonna received the treatments listed below:      THERAPEUTIC EXERCISES to develop strength, endurance, ROM, and flexibility: See flowsheet below:  Added resistance to LAQ's and SAQ's.      Ther- Ex Reps   Nustep 10 minutes NuStep    Wedge 2 minutes    Hamstring Stretch 3 x 30"    Heelslides (seated) 30 x (109 degrees)    LAQ's 3 x 10 2# *    Hamstring Curls 2 x 10 green TB    Heel Slides (supine)  30 x (109 degrees)   Quad sets  30 x 3" (0 degrees)    SAQ's 30 x 2 # *    Straight Leg Raises 2 x 10    Sidelying hip ABD  2 x 10            THERAPEUTIC ACTIVITIES to improve functional and dynamic performance.  See flow-sheet below: Increased reps with heel raises, mini squats, 3 way hip, and standing marching.     Ther-Act     Heel Raises 3 x 10 *   Mini Squats 3 x 10 *   3 way hip 15 x each *   Sit <> stands  2 x 10 *    Forward Step " "ups 20 x    Lateral Step ups 20 x    Standing marching  3 x 10 *                               Patient declined:  SUPERVISED MODALITIES: Vasopneumatic device applied to L knee with elevation above heart level for 10 minutes on low pressure.          Patient Education and Home Exercises     Home Exercises Provided and Patient Education Provided     Education provided:   - POC, HEP, DOMS     Written Home Exercises Provided: Patient instructed to cont prior HEP. Exercises were reviewed and Jonna was able to demonstrate them prior to the end of the session.  Jonna demonstrated good  understanding of the education provided. See EMR under Patient Instructions for exercises provided during therapy sessions    ASSESSMENT     Jonna is a 67 y.o. female referred to outpatient Physical Therapy with a medical diagnosis of L TKA. Pt presents with decreased L knee ROM, LLE muscle weakness, L knee pain, and impaired mobility.  Patient requires frequent verbal and visual cues to decrease postural leaning and to increase weight bearing in Left LE.  Patient requires frequent rest breaks during tx.  Min Quad Lag noted with supine SLR's.  Patient declined CP application at end of treatment, and states "I will put ice on it as soon as I get home and get in my recliner". No adverse effects noted.     Jonna Is progressing well towards her goals.   Pt prognosis is Good.     Pt will continue to benefit from skilled outpatient physical therapy to address the deficits listed in the problem list box on initial evaluation, provide pt/family education and to maximize pt's level of independence in the home and community environment.     Pt's spiritual, cultural and educational needs considered and pt agreeable to plan of care and goals.     Anticipated barriers to physical therapy: Compliance with HEP and treatment     Goals: (to be met by end of POC)  Pt will increase L knee flexion to 120, knee extension to 0 degrees, and quad lag to 0 " degrees to allow patient normal gait pattern.  Pt will increase L knee strength to 4/5 to allow patient to safely return to prior level of function.   Pt will report decrease in pain to 3/10 to improve quality of life.    PLAN     Plan of care Certification: 10/3/2022 to 11/18/2022.     Outpatient Physical Therapy 2 times weekly for 6 weeks to include the following interventions: Electrical Stimulation unattend, Gait Training, Moist Heat/ Ice, Patient Education, Therapeutic Activities, and Therapeutic Exercise.    Continue POC Per PT order to progress patient toward rehab goals.    DANNIE Angel   10/13/2022

## 2022-10-18 ENCOUNTER — CLINICAL SUPPORT (OUTPATIENT)
Dept: REHABILITATION | Facility: HOSPITAL | Age: 67
End: 2022-10-18
Payer: MEDICARE

## 2022-10-18 DIAGNOSIS — Z96.652 S/P TOTAL KNEE ARTHROPLASTY, LEFT: Primary | ICD-10-CM

## 2022-10-18 PROCEDURE — 97110 THERAPEUTIC EXERCISES: CPT | Mod: CQ

## 2022-10-18 PROCEDURE — 97530 THERAPEUTIC ACTIVITIES: CPT | Mod: CQ

## 2022-10-18 NOTE — PROGRESS NOTES
"[facility]   Physical Therapy Treatment Note     Name: Jonna Cunha  Clinic Number: [id number]  Therapy Diagnosis: No diagnosis found.  Physician: Merced Stanton FNP     Physician Orders: PT Eval and Treat   Medical Diagnosis from Referral: s/p L TKA   Evaluation Date: 10/3/2022  Authorization Period Expiration: 10/3/2023  Plan of Care Expiration: 11/18/2022  Visit # / Visits authorized: 4/12     Time In: 9:44  Time Out: 10:36  Total Appointment Time (timed & untimed codes): 52 minutes      Precautions: Standard  Visit Date: 10/18/2022    PTA Visit #: 3/5       SUBJECTIVE     Pt reports:  "My knee is sore this morning and I didn't get any sleep last night."     She was compliant with home exercise program.  Response to previous treatment: Post treatment soreness   Functional change: Early in POC     Pain: 7/10  Location: left knee, LE      OBJECTIVE     Objective Measures updated at progress report unless specified.     Treatment     Jonna received the treatments listed below:      THERAPEUTIC EXERCISES to develop strength, endurance, ROM, and flexibility: See flowsheet below      Ther- Ex Reps   Nustep 10 minutes NuStep    Wedge 2 minutes    Hamstring Stretch 3 x 30"    Heelslides (seated) 30 x (100 degrees)    LAQ's 3 x 10 3" *   Hamstring Curls 2 x 10 green TB    Heel Slides (supine)  30 x (103 degrees)   Quad sets  30 x 3" (0 degrees)    SAQ's 20 x 3"  (Not Completed)   Straight Leg Raises 2 x 10    Sidelying hip ABD  2 x 10            THERAPEUTIC ACTIVITIES to improve functional and dynamic performance.  See flow-sheet below: Increased repetitions on heel raises, standing marches and mini squats. Added mini Lunges   Ther-Act     Heel Raises 3 x 10    Mini Squats 3 x 10    3 way hip 10 x each   Sit <> stands  15 x    Forward Step ups 20 x    Lateral Step ups 20 x    Standing marching  3 x 10    Mini Lunges  15 x                         SUPERVISED MODALITIES: Vasopneumatic device " applied to L knee with elevation above heart level for 10 minutes on low pressure. (Pt Declined)         Patient Education and Home Exercises     Home Exercises Provided and Patient Education Provided     Education provided:   - POC, HEP, DOMS     Written Home Exercises Provided: Patient instructed to cont prior HEP. Exercises were reviewed and Jonna was able to demonstrate them prior to the end of the session.  Jonna demonstrated good  understanding of the education provided. See EMR under Patient Instructions for exercises provided during therapy sessions    ASSESSMENT     Jonna is a 67 y.o. female referred to outpatient Physical Therapy with a medical diagnosis of L TKA. Pt presents with decreased L knee ROM, LLE muscle weakness, L knee pain, and impaired mobility. Pt presents with increased c/o soreness throughout tx secondary to increased household work on yesterday. Pt required verbal and visual cues to progress through Ther Ex and Ther Act with proper alignment, body mechanics, speed of movement, count, and hold times.  Pt had decrease ROM during seated heel slides secondary to increased soreness. LPTA educated pt on RICE following any increased activity. Pt declined modalities at the end of tx. No adverse effects noted.    Jonna Is progressing well towards her goals.   Pt prognosis is Good.     Pt will continue to benefit from skilled outpatient physical therapy to address the deficits listed in the problem list box on initial evaluation, provide pt/family education and to maximize pt's level of independence in the home and community environment.     Pt's spiritual, cultural and educational needs considered and pt agreeable to plan of care and goals.     Anticipated barriers to physical therapy: Compliance with HEP and treatment     Goals: (to be met by end of POC)  Pt will increase L knee flexion to 120, knee extension to 0 degrees, and quad lag to 0 degrees to allow patient normal gait pattern.  Pt  will increase L knee strength to 4/5 to allow patient to safely return to prior level of function.   Pt will report decrease in pain to 3/10 to improve quality of life.    PLAN     Plan of care Certification: 10/3/2022 to 11/18/2022.     Outpatient Physical Therapy 2 times weekly for 6 weeks to include the following interventions: Electrical Stimulation unattend, Gait Training, Moist Heat/ Ice, Patient Education, Therapeutic Activities, and Therapeutic Exercise.    Continue POC Per PT order to progress patient toward rehab goals.    DANNIE Cruz  10/18/2022

## 2022-10-24 DIAGNOSIS — Z96.652 S/P TOTAL KNEE REPLACEMENT, LEFT: Primary | ICD-10-CM

## 2022-10-25 ENCOUNTER — CLINICAL SUPPORT (OUTPATIENT)
Dept: REHABILITATION | Facility: HOSPITAL | Age: 67
End: 2022-10-25
Payer: MEDICARE

## 2022-10-25 PROCEDURE — 97530 THERAPEUTIC ACTIVITIES: CPT | Mod: CQ

## 2022-10-25 PROCEDURE — 97110 THERAPEUTIC EXERCISES: CPT | Mod: CQ

## 2022-10-25 NOTE — PROGRESS NOTES
"[facility]   Physical Therapy Treatment Note     Name: Jonna Cunha  Clinic Number: [id number]  Therapy Diagnosis: No diagnosis found.  Physician: Merced Stanton FNP     Physician Orders: PT Eval and Treat   Medical Diagnosis from Referral: s/p L TKA   Evaluation Date: 10/3/2022  Authorization Period Expiration: 10/3/2023  Plan of Care Expiration: 11/18/2022  Visit # / Visits authorized: 6/12     Time In: 10:00  (Late office check in due to office staff at remote location)   Time Out: 10:54  Total Appointment Time (timed & untimed codes): 54 minutes      Precautions: Standard  Visit Date: 10/25/2022    PTA Visit #: 5/5      SUBJECTIVE     Pt reports:  "I'm pretty sore today in my knee (L)".  Patient states she baby-sat grandkids over the weekend and was up on her feet a lot".     She was compliant with home exercise program.  Response to previous treatment: Post treatment soreness   Functional change: Early in POC     Pain: 7/10  Location: left knee, LE      OBJECTIVE     Objective Measures updated at progress report unless specified.     Treatment     Jonna received the treatments listed below:      THERAPEUTIC EXERCISES to develop strength, endurance, ROM, and flexibility: See flowsheet below      Ther- Ex Reps   Nustep 10 minutes NuStep    Wedge 2 minutes    Hamstring Stretch 3 x 30"    Heelslides (seated) 30 x (109 degrees)    LAQ's 3 x 10 3" *   Hamstring Curls 2 x 10 green TB    Heel Slides (supine)  30 x (110 degrees)   Quad sets  30 x 3" (0 degrees)    SAQ's 20 x 3"  (Not Completed)   Straight Leg Raises 2 x 10    Sidelying hip ABD  2 x 10            THERAPEUTIC ACTIVITIES to improve functional and dynamic performance.  See flow-sheet below: Increased repetitions on sit to stands and and lateral step ups.     Ther-Act     Heel Raises 3 x 10    Mini Squats 3 x 10    3 way hip 10 x each   Sit <> stands  30 x *    Forward Step ups 30 x  *   Lateral Step ups 20 x    Standing " marching  3 x 10    Mini Lunges  15 x                            Patient Education and Home Exercises     Home Exercises Provided and Patient Education Provided     Education provided:   - POC, HEP, DOMS     Written Home Exercises Provided: Patient instructed to cont prior HEP. Exercises were reviewed and Jonna was able to demonstrate them prior to the end of the session.  Jonna demonstrated good  understanding of the education provided. See EMR under Patient Instructions for exercises provided during therapy sessions    ASSESSMENT     Jonna is a 67 y.o. female referred to outpatient Physical Therapy with a medical diagnosis of L TKA. Pt presents with decreased L knee ROM, LLE muscle weakness, L knee pain, and impaired mobility. Pt presents with increased c/o soreness throughout tx secondary to increased household work on yesterday. Pt required min verbal and visual cues to progress through Ther Ex and Ther Act with proper alignment, body mechanics, speed of movement, count, and hold times.  Noted improved AROM Left knee flexion at end of physical therapy treatment session compared to previous treatment.  Patient declined CP application in rehab gym.  No adverse effects noted.     Jonna Is progressing well towards her goals.   Pt prognosis is Good.     Pt will continue to benefit from skilled outpatient physical therapy to address the deficits listed in the problem list box on initial evaluation, provide pt/family education and to maximize pt's level of independence in the home and community environment.     Pt's spiritual, cultural and educational needs considered and pt agreeable to plan of care and goals.     Anticipated barriers to physical therapy: Compliance with HEP and treatment     Goals: (to be met by end of POC)  Pt will increase L knee flexion to 120, knee extension to 0 degrees, and quad lag to 0 degrees to allow patient normal gait pattern.  Pt will increase L knee strength to 4/5 to allow  patient to safely return to prior level of function.   Pt will report decrease in pain to 3/10 to improve quality of life.    PLAN     Plan of care Certification: 10/3/2022 to 11/18/2022.     Outpatient Physical Therapy 2 times weekly for 6 weeks to include the following interventions: Electrical Stimulation unattend, Gait Training, Moist Heat/ Ice, Patient Education, Therapeutic Activities, and Therapeutic Exercise.    Continue POC Per PT order to progress patient toward rehab goals.    DANNIE Angel   10/25/2022

## 2022-10-27 ENCOUNTER — HOSPITAL ENCOUNTER (OUTPATIENT)
Dept: RADIOLOGY | Facility: HOSPITAL | Age: 67
Discharge: HOME | End: 2022-10-27
Attending: ORTHOPAEDIC SURGERY
Payer: MEDICARE

## 2022-10-27 ENCOUNTER — OFFICE VISIT (OUTPATIENT)
Dept: ORTHOPEDICS | Facility: CLINIC | Age: 67
End: 2022-10-27
Payer: MEDICARE

## 2022-10-27 DIAGNOSIS — Z96.652 S/P TOTAL KNEE REPLACEMENT, LEFT: ICD-10-CM

## 2022-10-27 DIAGNOSIS — Z96.659 STATUS POST TOTAL KNEE REPLACEMENT, UNSPECIFIED LATERALITY: Primary | ICD-10-CM

## 2022-10-27 PROCEDURE — 73562 X-RAY EXAM OF KNEE 3: CPT | Mod: TC,LT

## 2022-10-27 PROCEDURE — 99024 PR POST-OP FOLLOW-UP VISIT: ICD-10-PCS | Mod: ,,, | Performed by: ORTHOPAEDIC SURGERY

## 2022-10-27 PROCEDURE — 99024 POSTOP FOLLOW-UP VISIT: CPT | Mod: ,,, | Performed by: ORTHOPAEDIC SURGERY

## 2022-10-27 PROCEDURE — 73562 XR KNEE AP LAT WITH SUNRISE LEFT: ICD-10-PCS | Mod: 26,LT,, | Performed by: ORTHOPAEDIC SURGERY

## 2022-10-27 PROCEDURE — 73562 X-RAY EXAM OF KNEE 3: CPT | Mod: 26,LT,, | Performed by: ORTHOPAEDIC SURGERY

## 2022-10-27 RX ORDER — TRAMADOL HYDROCHLORIDE 50 MG/1
50 TABLET ORAL EVERY 6 HOURS PRN
Qty: 30 TABLET | Refills: 0 | Status: SHIPPED | OUTPATIENT
Start: 2022-10-27

## 2022-10-27 NOTE — PROGRESS NOTES
Post-Operative Total Knee        Arthroplasty, Knee, Total, Using Computer-assisted Navigation - Left 9/14/2022      Pt is here today for Second post-operative followup of her Arthroplasty, Knee, Total, Using Computer-assisted Navigation - Left.  she is doing well.  We have reviewed her findings and discussed plan of care and future treatment options, including the physical therapy plan.      Pt states she is having problems sleeping due to pain, she is going to therapy twice a week, her pain score on today is 6, before surgery over 10.    Physical Exam:     The affected knee was inspected today.   The wound is healing well with no signs of erythema or warmth.  There is no drainage.  No clinical signs or symptoms of infection are present.   ROM: 0-114  -Cristobal's sign.  2+ pulses are present.       Radiographs of the left knee were obtained today.  The prosthesis appears to be in excellent overall alignment.  No evidence of loosening or fracture is demonstrated.  Components appear to be appropriately positioned with no adverse interval change.     Assessment and Plan  1. Status post total knee replacement, unspecified laterality          We will continue post-operative physical therapy until the patient feels that they are independent with a home rehab program.  Return to work status/ return to activities status was discussed today in detail. All questions were answered.    The use of stockings and DVT prophylaxis was discussed.  Will follow up in additional 4-6 weeks with radiographs at that time.   Rtc 6 weeks with xrays  Tramadol written    There are no Patient Instructions on file for this visit.

## 2022-10-28 ENCOUNTER — CLINICAL SUPPORT (OUTPATIENT)
Dept: REHABILITATION | Facility: HOSPITAL | Age: 67
End: 2022-10-28
Payer: MEDICARE

## 2022-10-28 PROBLEM — R52 PAIN: Chronic | Status: ACTIVE | Noted: 2021-09-23

## 2022-10-28 PROCEDURE — 97110 THERAPEUTIC EXERCISES: CPT

## 2022-10-28 PROCEDURE — 97530 THERAPEUTIC ACTIVITIES: CPT

## 2022-10-28 NOTE — PROGRESS NOTES
"[facility]   Physical Therapy Treatment Note     Name: Jonna Cunha  Clinic Number: [id number]  Therapy Diagnosis: No diagnosis found.  Physician: Merced Stanton FNP     Physician Orders: PT Eval and Treat   Medical Diagnosis from Referral: s/p L TKA   Evaluation Date: 10/3/2022  Authorization Period Expiration: 10/3/2023  Plan of Care Expiration: 11/18/2022  Visit # / Visits authorized: 7/12     Time In: 12:05  Time Out: 12:51   Total Appointment Time (timed & untimed codes): 46 minutes      Precautions: Standard  Visit Date: 10/28/2022    PTA Visit #: 0/5      SUBJECTIVE     Pt reports:  "This knee is hurting pretty bad today. I am not sure why."     She was compliant with home exercise program.  Response to previous treatment: Post treatment soreness   Functional change: Early in POC     Pain: 7/10  Location: left knee, LE      OBJECTIVE     Objective Measures updated at progress report unless specified.     Treatment     Jonna received the treatments listed below:      THERAPEUTIC EXERCISES to develop strength, endurance, ROM, and flexibility: See flowsheet below      Ther- Ex Reps   Nustep 8 minutes NuStep    Wedge 2 minutes    Hamstring Stretch 3 x 30"    Heelslides (seated) 30 x (112 degrees)    LAQ's 3 x 10 3" *   Hamstring Curls 2 x 10 green TB    Heel Slides (supine)  30 x (110 degrees)   Quad sets  30 x 3" (0 degrees)    Straight Leg Raises 2 x 10    Sidelying hip ABD  2 x 10            THERAPEUTIC ACTIVITIES to improve functional and dynamic performance.  See flow-sheet below:   Ther-Act     Heel Raises 3 x 10    Mini Squats 3 x 10    3 way hip 10 x each   Sit <> stands  30 x    Forward Step ups 30 x     Lateral Step ups 20 x    Standing marching  3 x 10    Mini Lunges  15 x                             Patient Education and Home Exercises     Home Exercises Provided and Patient Education Provided     Education provided:   - POC, HEP, DOMS     Written Home Exercises " Provided: Patient instructed to cont prior HEP. Exercises were reviewed and Jonna was able to demonstrate them prior to the end of the session.  Jonna demonstrated good  understanding of the education provided. See EMR under Patient Instructions for exercises provided during therapy sessions    ASSESSMENT     Jonna is a 67 y.o. female referred to outpatient Physical Therapy with a medical diagnosis of L TKA. Pt presents with decreased L knee ROM, LLE muscle weakness, L knee pain, and impaired mobility. Patient reported that she had an MD follow-up yesterday and he ordered continued PT for six more weeks. Patient required verbal and visual cueing throughout treatment session for proper LE alignment, speed, and decreased compensations with ther-ex and standing tasks. Patient had no reports of adverse effects to treatment.   Jonna Is progressing well towards her goals.   Pt prognosis is Good.     Pt will continue to benefit from skilled outpatient physical therapy to address the deficits listed in the problem list box on initial evaluation, provide pt/family education and to maximize pt's level of independence in the home and community environment.     Pt's spiritual, cultural and educational needs considered and pt agreeable to plan of care and goals.     Anticipated barriers to physical therapy: Compliance with HEP and treatment     Goals: (to be met by end of POC)  Pt will increase L knee flexion to 120, knee extension to 0 degrees, and quad lag to 0 degrees to allow patient normal gait pattern.  Pt will increase L knee strength to 4/5 to allow patient to safely return to prior level of function.   Pt will report decrease in pain to 3/10 to improve quality of life.    PLAN     Plan of care Certification: 10/3/2022 to 11/18/2022.     Outpatient Physical Therapy 2 times weekly for 6 weeks to include the following interventions: Electrical Stimulation unattend, Gait Training, Moist Heat/ Ice, Patient Education,  Therapeutic Activities, and Therapeutic Exercise.    Continue POC Per PT order to progress patient toward rehab goals.    Nelda Silva, PT, DPT    10/28/2022

## 2022-11-01 ENCOUNTER — CLINICAL SUPPORT (OUTPATIENT)
Dept: REHABILITATION | Facility: HOSPITAL | Age: 67
End: 2022-11-01
Payer: MEDICARE

## 2022-11-01 DIAGNOSIS — Z96.652 HISTORY OF TOTAL LEFT KNEE REPLACEMENT (TKR): Primary | ICD-10-CM

## 2022-11-01 PROCEDURE — 97530 THERAPEUTIC ACTIVITIES: CPT | Mod: CQ

## 2022-11-01 PROCEDURE — 97110 THERAPEUTIC EXERCISES: CPT | Mod: CQ

## 2022-11-01 NOTE — PROGRESS NOTES
"[facility]   Physical Therapy Treatment Note     Name: Jonna Cunha  Clinic Number: [id number]  Therapy Diagnosis: No diagnosis found.  Physician: Merced Stanton FNP     Physician Orders: PT Eval and Treat   Medical Diagnosis from Referral: s/p L TKA   Evaluation Date: 10/3/2022  Authorization Period Expiration: 10/3/2023  Plan of Care Expiration: 11/18/2022  Visit # / Visits authorized: 8/12     Time In: 10:02  Time Out: 10:49  Total Appointment Time (timed & untimed codes): 47 minutes      Precautions: Standard  Visit Date: 11/1/2022    PTA Visit #: 1/5      SUBJECTIVE     Pt reports:  "My knee doesn't hurt too bad, I just have some soreness in the side of my knee."     She was compliant with home exercise program.  Response to previous treatment: Post treatment soreness   Functional change: Early in POC     Pain: 4/10  Location: left knee, LE      OBJECTIVE     Objective Measures updated at progress report unless specified.     Treatment     Jonna received the treatments listed below:      THERAPEUTIC EXERCISES to develop strength, endurance, ROM, and flexibility: See flowsheet below: indicates increased resistance or reps     Ther- Ex Reps   Nustep/Bike 10 minutes Bike    Wedge 2 minutes    Hamstring Stretch 3 x 30"    Heelslides (seated) 30 x (120 degrees)    LAQ's 3 x 10 1.5# *   Hamstring Curls 2 x 10 green TB    Heel Slides (supine)  30 x (115 degrees)   Quad sets  30 x 3" (0 degrees)    Straight Leg Raises 2 x 10 2# *   Sidelying hip ABD  2 x 10            THERAPEUTIC ACTIVITIES to improve functional and dynamic performance.  See flow-sheet below: FSU and Lsu   Ther-Act     Heel Raises 3 x 10    Mini Squats 3 x 10    3 way hip 10 x each   Sit <> stands  30 x    Forward Step ups 20 x  High Step *   Lateral Step ups 10 x High Step *   Standing marching  3 x 10    Mini Lunges  15 x                             Patient Education and Home Exercises     Home Exercises Provided and " Patient Education Provided     Education provided:   - POC, HEP, DOMS     Written Home Exercises Provided: Patient instructed to cont prior HEP. Exercises were reviewed and Jonna was able to demonstrate them prior to the end of the session.  Jonna demonstrated good  understanding of the education provided. See EMR under Patient Instructions for exercises provided during therapy sessions    ASSESSMENT   Jonna is a 67 y.o. female referred to outpatient Physical Therapy with a medical diagnosis of L TKA. Pt presents with decreased L knee ROM, LLE muscle weakness, L knee pain, and impaired mobility. Pt displayed some signs of fatigue during sit<>stands. Pt required minimal cues to progress TherEx and TherAct. Pt displayed significant improvement in flexion ROM while supine and sitting. Pt had no reports of adverse effects to treatment.   Jonna Is progressing well towards her goals.   Pt prognosis is Good.     Pt will continue to benefit from skilled outpatient physical therapy to address the deficits listed in the problem list box on initial evaluation, provide pt/family education and to maximize pt's level of independence in the home and community environment.     Pt's spiritual, cultural and educational needs considered and pt agreeable to plan of care and goals.     Anticipated barriers to physical therapy: Compliance with HEP and treatment     Goals: (to be met by end of POC)  Pt will increase L knee flexion to 120, knee extension to 0 degrees, and quad lag to 0 degrees to allow patient normal gait pattern.  Pt will increase L knee strength to 4/5 to allow patient to safely return to prior level of function.   Pt will report decrease in pain to 3/10 to improve quality of life.    PLAN     Plan of care Certification: 10/3/2022 to 11/18/2022.     Outpatient Physical Therapy 2 times weekly for 6 weeks to include the following interventions: Electrical Stimulation unattend, Gait Training, Moist Heat/ Ice,  Patient Education, Therapeutic Activities, and Therapeutic Exercise.    Continue POC Per PT order to progress patient toward rehab goals.    DANNIE Cruz   11/1/2022

## 2022-11-03 ENCOUNTER — CLINICAL SUPPORT (OUTPATIENT)
Dept: REHABILITATION | Facility: HOSPITAL | Age: 67
End: 2022-11-03
Payer: MEDICARE

## 2022-11-03 PROCEDURE — 97110 THERAPEUTIC EXERCISES: CPT | Mod: CQ

## 2022-11-03 PROCEDURE — 97530 THERAPEUTIC ACTIVITIES: CPT | Mod: CQ

## 2022-11-03 NOTE — PROGRESS NOTES
"[facility]   Physical Therapy Treatment Note     Name: Jonna Cunha  Clinic Number: [id number]  Therapy Diagnosis: No diagnosis found.  Physician: Merced Stanton FNP     Physician Orders: PT Eval and Treat   Medical Diagnosis from Referral: s/p L TKA   Evaluation Date: 10/3/2022  Authorization Period Expiration: 10/3/2023  Plan of Care Expiration: 11/18/2022  Visit # / Visits authorized: 9/12     Time In: 9:52  Time Out: 10:54  Total Appointment Time (timed & untimed codes): 62 minutes      Precautions: Standard  Visit Date: 11/3/2022    PTA Visit #: 2/5      SUBJECTIVE     Pt reports:  "My knee is really sore today, but I did do more exercises in therapy the last time I was here".     She was compliant with home exercise program.  Response to previous treatment: Post treatment soreness   Functional change: Early in POC     Pain: 4/10  Location: left knee, LE      OBJECTIVE     Objective Measures updated at progress report unless specified.     Treatment     Jonna received the treatments listed below:      THERAPEUTIC EXERCISES to develop strength, endurance, ROM, and flexibility: See flowsheet below: indicates increased resistance or reps     Ther- Ex Reps   Nustep/Bike 10 minutes Bike    Wedge 2 minutes    Hamstring Stretch 3 x 30"    Heelslides (seated) 30 x (120 degrees)    LAQ's 3 x 10 1.5# *   Hamstring Curls 2 x 10 green TB    Heel Slides (supine)  30 x (115 degrees)   Quad sets  30 x 3" (0 degrees)    Straight Leg Raises 2 x 10 2# *   Sidelying hip ABD  2 x 10            THERAPEUTIC ACTIVITIES to improve functional and dynamic performance.  See flow-sheet below:   Ther-Act     Heel Raises 3 x 10    Mini Squats 3 x 10    3 way hip 10 x each   Sit <> stands  30 x    Forward Step ups 20 x  High Step    Lateral Step ups 10 x High Step    Standing marching  3 x 10    Mini Lunges  15 x                             Patient Education and Home Exercises     Home Exercises Provided and " Patient Education Provided     Education provided:   - POC, HEP, DOMS     Written Home Exercises Provided: Patient instructed to cont prior HEP. Exercises were reviewed and Jonna was able to demonstrate them prior to the end of the session.  Jonna demonstrated good  understanding of the education provided. See EMR under Patient Instructions for exercises provided during therapy sessions    ASSESSMENT   Jonna is a 67 y.o. female referred to outpatient Physical Therapy with a medical diagnosis of L TKA. Pt presents with decreased L knee ROM, LLE muscle weakness, L knee pain, and impaired mobility.  Patient requires mod cues to maintain proper form and alignment, count, and hold times during Ther Act.  Noted improved AROM L knee flexion at end treatment session.  Patient declined CP application at end of treatment session.     Jonna Is progressing well towards her goals.   Pt prognosis is Good.     Pt will continue to benefit from skilled outpatient physical therapy to address the deficits listed in the problem list box on initial evaluation, provide pt/family education and to maximize pt's level of independence in the home and community environment.     Pt's spiritual, cultural and educational needs considered and pt agreeable to plan of care and goals.     Anticipated barriers to physical therapy: Compliance with HEP and treatment     Goals: (to be met by end of POC)  Pt will increase L knee flexion to 120, knee extension to 0 degrees, and quad lag to 0 degrees to allow patient normal gait pattern.  Pt will increase L knee strength to 4/5 to allow patient to safely return to prior level of function.   Pt will report decrease in pain to 3/10 to improve quality of life.    PLAN     Plan of care Certification: 10/3/2022 to 11/18/2022.     Outpatient Physical Therapy 2 times weekly for 6 weeks to include the following interventions: Electrical Stimulation unattend, Gait Training, Moist Heat/ Ice, Patient  Education, Therapeutic Activities, and Therapeutic Exercise.    Continue POC Per PT order to progress patient toward rehab goals.    DANNIE Angel     11/3/2022

## 2022-11-08 ENCOUNTER — CLINICAL SUPPORT (OUTPATIENT)
Dept: REHABILITATION | Facility: HOSPITAL | Age: 67
End: 2022-11-08
Payer: MEDICARE

## 2022-11-08 PROCEDURE — 97530 THERAPEUTIC ACTIVITIES: CPT | Mod: CQ

## 2022-11-08 PROCEDURE — 97110 THERAPEUTIC EXERCISES: CPT | Mod: CQ

## 2022-11-08 NOTE — PROGRESS NOTES
"[facility]   Physical Therapy Treatment Note     Name: Jonna Cunha  Clinic Number: [id number]  Therapy Diagnosis: No diagnosis found.  Physician: Merced Stanton FNP     Physician Orders: PT Eval and Treat   Medical Diagnosis from Referral: s/p L TKA   Evaluation Date: 10/3/2022  Authorization Period Expiration: 10/3/2023  Plan of Care Expiration: 11/18/2022  Visit # / Visits authorized: 10/12     Time In: 9:58  Time Out: 10:40  Total Appointment Time (timed & untimed codes): 42 minutes      Precautions: Standard  Visit Date: 11/8/2022    PTA Visit #: 3/5      SUBJECTIVE     Pt reports:  "I'm mostly just sore".     She was compliant with home exercise program.  Response to previous treatment: Post treatment soreness   Functional change: Early in POC     Pain: 2/10  Location: left knee, LE      OBJECTIVE     Objective Measures updated at progress report unless specified.     Treatment     Jonna received the treatments listed below:      THERAPEUTIC EXERCISES to develop strength, endurance, ROM, and flexibility: See flowsheet below: indicates increased resistance or reps     Ther- Ex Reps   Nustep/Bike 10 minutes Bike    Wedge 2 minutes    Hamstring Stretch 3 x 30"    Heelslides (seated) 30 x (120 degrees)    LAQ's 3 x 10 1.5# *   Hamstring Curls 2 x 10 green TB    Heel Slides (supine)  30 x (115 degrees)   Quad sets  30 x 3" (0 degrees)    Straight Leg Raises 3 x 10 2#    Sidelying hip ABD  2 x 10            THERAPEUTIC ACTIVITIES to improve functional and dynamic performance.  See flow-sheet below:   Ther-Act     Heel Raises 3 x 10    Mini Squats 3 x 10    3 way hip 15 x each   Sit <> stands  30 x    Forward Step ups 20 x  High Step    Lateral Step ups 10 x High Step    Standing marching  3 x 10    Mini Lunges  15 x                             Patient Education and Home Exercises     Home Exercises Provided and Patient Education Provided     Education provided:   - POC, HEP, DOMS "     Written Home Exercises Provided: Patient instructed to cont prior HEP. Exercises were reviewed and Jonna was able to demonstrate them prior to the end of the session.  Jonna demonstrated good  understanding of the education provided. See EMR under Patient Instructions for exercises provided during therapy sessions    ASSESSMENT   Jonna is a 67 y.o. female referred to outpatient Physical Therapy with a medical diagnosis of L TKA. Pt presents with decreased L knee ROM, LLE muscle weakness, L knee pain, and impaired mobility. Pt able to complete mini squats without upper extremity support. Pt c/o left knee soreness during flexion portion of standing marches. Pt required decreased rest breaks and cueing throughout tx.  AROM L knee flexion slightly decreased secondary to soreness.  Pt declined CP application at end of tx session.     Jonna Is progressing well towards her goals.   Pt prognosis is Good.     Pt will continue to benefit from skilled outpatient physical therapy to address the deficits listed in the problem list box on initial evaluation, provide pt/family education and to maximize pt's level of independence in the home and community environment.     Pt's spiritual, cultural and educational needs considered and pt agreeable to plan of care and goals.     Anticipated barriers to physical therapy: Compliance with HEP and treatment     Goals: (to be met by end of POC)  Pt will increase L knee flexion to 120, knee extension to 0 degrees, and quad lag to 0 degrees to allow patient normal gait pattern.  Pt will increase L knee strength to 4/5 to allow patient to safely return to prior level of function.   Pt will report decrease in pain to 3/10 to improve quality of life.    PLAN     Plan of care Certification: 10/3/2022 to 11/18/2022.     Outpatient Physical Therapy 2 times weekly for 6 weeks to include the following interventions: Electrical Stimulation unattend, Gait Training, Moist Heat/ Ice, Patient  Education, Therapeutic Activities, and Therapeutic Exercise.    Continue POC Per PT order to progress patient toward rehab goals.    DANNIE Cruz     11/8/2022

## 2022-11-10 ENCOUNTER — CLINICAL SUPPORT (OUTPATIENT)
Dept: REHABILITATION | Facility: HOSPITAL | Age: 67
End: 2022-11-10
Payer: MEDICARE

## 2022-11-10 ENCOUNTER — DOCUMENTATION ONLY (OUTPATIENT)
Dept: REHABILITATION | Facility: HOSPITAL | Age: 67
End: 2022-11-10
Payer: MEDICARE

## 2022-11-10 PROCEDURE — 97530 THERAPEUTIC ACTIVITIES: CPT | Mod: CQ

## 2022-11-10 PROCEDURE — 97110 THERAPEUTIC EXERCISES: CPT | Mod: CQ

## 2022-11-10 NOTE — PROGRESS NOTES
Outpatient Therapy Discharge Summary     Name: Jonna Cunha  [facility] Number: [id number]  Therapy Diagnosis: No diagnosis found.  Physician: Merced Stanton FNP     Physician Orders: PT Eval and Treat   Medical Diagnosis from Referral: s/p L TKA   Evaluation Date: 10/3/2022  Authorization Period Expiration: 10/3/2023  Plan of Care Expiration: 11/18/2022  Visit # / Visits authorized: 12/12    Date of Last visit: 11/10/2022  Total Visits Received: 12  Cancelled Visits: 0  No Show Visits: 0    Assessment    Goals:  Pt will increase L knee flexion to 120, knee extension to 0 degrees, and quad lag to 0 degrees to allow patient normal gait pattern.-Goal Met  Pt will increase L knee strength to 4/5 to allow patient to safely return to prior level of function.-Goal Met  Pt will report decrease in pain to 3/10 to improve quality of life.-Goal Met    Discharge reason: Patient has met all of her goals.     Plan   This patient is discharged from Physical Therapy.   Nelda Silva, PT, DPT

## 2022-11-10 NOTE — PROGRESS NOTES
"[facility]   Physical Therapy Treatment Note     Name: Jonna Cunha  Clinic Number: [id number]  Therapy Diagnosis: No diagnosis found.  Physician: Merced Stanton FNP     Physician Orders: PT Eval and Treat   Medical Diagnosis from Referral: s/p L TKA   Evaluation Date: 10/3/2022  Authorization Period Expiration: 10/3/2023  Plan of Care Expiration: 11/18/2022  Visit # / Visits authorized: 12/12     Time In: 10:02 (late check in due to  being busy)  Time Out: 11:02  Total Appointment Time (timed & untimed codes): 60 minutes      Precautions: Standard  Visit Date: 11/10/2022    PTA Visit #: 4/5      SUBJECTIVE     Pt reports:  "I feel good with just a little pain".     She was compliant with home exercise program.  Response to previous treatment: Post treatment soreness   Functional change: Early in POC     Pain: 3/10  Location: left knee, LE      OBJECTIVE     Objective Measures updated at progress report unless specified.     Treatment     Jonna received the treatments listed below:      THERAPEUTIC EXERCISES to develop strength, endurance, ROM, and flexibility: See flowsheet below: indicates increased resistance or reps     Ther- Ex Reps   Nustep/Bike 10 minutes Bike    Wedge 2 minutes    Hamstring Stretch 3 x 30"    Heelslides (seated) 30 x (122 degrees)    LAQ's 3 x 10 1.5#    Hamstring Curls 2 x 10 green TB    Heel Slides (supine)  30 x (120 degrees)   Quad sets  30 x 3" (0 degrees)    Straight Leg Raises 3 x 10 2#    Sidelying hip ABD  2 x 10            THERAPEUTIC ACTIVITIES to improve functional and dynamic performance.  See flow-sheet below:   Ther-Act     Heel Raises 3 x 10    Mini Squats 3 x 10    3 way hip 15 x each   Sit <> stands  30 x    Forward Step ups 20 x  High Step    Lateral Step ups 10 x High Step    Standing marching  3 x 10    Mini Lunges  15 x                             Patient Education and Home Exercises     Home Exercises Provided and Patient " Education Provided     Education provided:   - POC, HEP, DOMS     Written Home Exercises Provided: Patient instructed to cont prior HEP. Exercises were reviewed and Jonna was able to demonstrate them prior to the end of the session.  Jonna demonstrated good  understanding of the education provided. See EMR under Patient Instructions for exercises provided during therapy sessions    ASSESSMENT   Jonna is a 67 y.o. female referred to outpatient Physical Therapy with a medical diagnosis of L TKA. Pt presents with decreased L knee ROM, LLE muscle weakness, L knee pain, and impaired mobility. LPTA provided pt with proper setup on bike to increase left knee ROM. Pt able to complete standing marches without upper extremity support. Pt displayed increased knee flexion with seated heel slides.  Pt required decreased rest breaks and cueing throughout tx. Pt declined CP application at end of tx session.     Jonna Is progressing well towards her goals.   Pt prognosis is Good.     Pt will continue to benefit from skilled outpatient physical therapy to address the deficits listed in the problem list box on initial evaluation, provide pt/family education and to maximize pt's level of independence in the home and community environment.     Pt's spiritual, cultural and educational needs considered and pt agreeable to plan of care and goals.     Anticipated barriers to physical therapy: Compliance with HEP and treatment     Goals: (to be met by end of POC)  Pt will increase L knee flexion to 120, knee extension to 0 degrees, and quad lag to 0 degrees to allow patient normal gait pattern.-Goal Met  Pt will increase L knee strength to 4/5 to allow patient to safely return to prior level of function.-Goal Met  Pt will report decrease in pain to 3/10 to improve quality of life.-Goal Met    PLAN     Plan of care Certification: 10/3/2022 to 11/18/2022.     Outpatient Physical Therapy 2 times weekly for 6 weeks to include the  following interventions: Electrical Stimulation unattend, Gait Training, Moist Heat/ Ice, Patient Education, Therapeutic Activities, and Therapeutic Exercise.    Plan to DC to home exercise program per PT.    DANNIE Cruz     11/10/2022

## 2022-12-07 DIAGNOSIS — Z96.659 STATUS POST TOTAL KNEE REPLACEMENT, UNSPECIFIED LATERALITY: Primary | ICD-10-CM

## 2022-12-07 DIAGNOSIS — Z96.652 S/P TOTAL KNEE REPLACEMENT, LEFT: ICD-10-CM

## 2022-12-08 ENCOUNTER — HOSPITAL ENCOUNTER (OUTPATIENT)
Dept: RADIOLOGY | Facility: HOSPITAL | Age: 67
Discharge: HOME | End: 2022-12-08
Attending: ORTHOPAEDIC SURGERY
Payer: MEDICARE

## 2022-12-08 ENCOUNTER — OFFICE VISIT (OUTPATIENT)
Dept: ORTHOPEDICS | Facility: CLINIC | Age: 67
End: 2022-12-08
Payer: MEDICARE

## 2022-12-08 DIAGNOSIS — Z96.652 S/P TOTAL KNEE REPLACEMENT, LEFT: ICD-10-CM

## 2022-12-08 DIAGNOSIS — Z96.652 S/P TOTAL KNEE REPLACEMENT, LEFT: Primary | ICD-10-CM

## 2022-12-08 PROCEDURE — 73562 X-RAY EXAM OF KNEE 3: CPT | Mod: TC,LT

## 2022-12-08 PROCEDURE — 73562 XR KNEE AP LAT WITH SUNRISE LEFT: ICD-10-PCS | Mod: 26,LT,, | Performed by: ORTHOPAEDIC SURGERY

## 2022-12-08 PROCEDURE — 99024 POSTOP FOLLOW-UP VISIT: CPT | Mod: ,,, | Performed by: NURSE PRACTITIONER

## 2022-12-08 PROCEDURE — 99024 PR POST-OP FOLLOW-UP VISIT: ICD-10-PCS | Mod: ,,, | Performed by: NURSE PRACTITIONER

## 2022-12-08 PROCEDURE — 73562 X-RAY EXAM OF KNEE 3: CPT | Mod: 26,LT,, | Performed by: ORTHOPAEDIC SURGERY

## 2022-12-08 NOTE — PROGRESS NOTES
HISTORY OF PRESENT ILLNESS:       No surgery found No surgery found      Pt is here today for Third post-operative followup of her No surgery found.  she is doing well.  We have reviewed her findings and discussed plan of care and future treatment options, including the physical therapy plan.      Patient is 3 months post op left total knee arthroplasty, doing well. Reports she still has some swelling and tightness at time but overall doing much better. Incision is well healed. She is very happy with her motion. She has completed therapy.                                                                               PHYSICAL EXAMINATION:     Incision sites healed well  No evidence of any erythema, infection or induration  Minimal effusion  2+ DP pulse  Knee ROM:   0-110                                                                                 ASSESSMENT:                                                                                                                                               1. Status post above, doing well.                                                                                                                               PLAN:       Wounds were treated today  DVT prophylaxis discussed  Therapy plan discussed in great detail today; all questions answered.              Continue working on exercises at home  RTC in 3 months with Dr Jackson                                                                                                                                   There are no Patient Instructions on file for this visit.

## 2023-01-11 DIAGNOSIS — N20.0 KIDNEY STONES: Primary | ICD-10-CM

## 2023-02-01 ENCOUNTER — HOSPITAL ENCOUNTER (OUTPATIENT)
Dept: RADIOLOGY | Facility: HOSPITAL | Age: 68
Discharge: HOME | End: 2023-02-01
Attending: NURSE PRACTITIONER
Payer: MEDICARE

## 2023-02-01 ENCOUNTER — OFFICE VISIT (OUTPATIENT)
Dept: UROLOGY | Facility: CLINIC | Age: 68
End: 2023-02-01
Payer: MEDICARE

## 2023-02-01 VITALS
TEMPERATURE: 98 F | SYSTOLIC BLOOD PRESSURE: 140 MMHG | BODY MASS INDEX: 31.99 KG/M2 | DIASTOLIC BLOOD PRESSURE: 80 MMHG | HEIGHT: 65 IN | WEIGHT: 192 LBS | HEART RATE: 74 BPM | OXYGEN SATURATION: 96 % | RESPIRATION RATE: 16 BRPM

## 2023-02-01 DIAGNOSIS — R10.9 ABDOMINAL PAIN, UNSPECIFIED ABDOMINAL LOCATION: ICD-10-CM

## 2023-02-01 DIAGNOSIS — R10.9 RIGHT FLANK PAIN: ICD-10-CM

## 2023-02-01 DIAGNOSIS — N20.0 KIDNEY STONES: ICD-10-CM

## 2023-02-01 DIAGNOSIS — N20.0 BILATERAL KIDNEY STONES: Primary | ICD-10-CM

## 2023-02-01 DIAGNOSIS — K57.92 DIVERTICULITIS: ICD-10-CM

## 2023-02-01 DIAGNOSIS — R31.9 HEMATURIA, UNSPECIFIED TYPE: ICD-10-CM

## 2023-02-01 LAB
BILIRUB UR QL STRIP: NEGATIVE
CLARITY UR: CLEAR
COLOR UR: YELLOW
GLUCOSE UR STRIP-MCNC: 500 MG/DL
KETONES UR STRIP-SCNC: NEGATIVE MG/DL
LEUKOCYTE ESTERASE UR QL STRIP: ABNORMAL
MUCOUS, UA: ABNORMAL /LPF
NITRITE UR QL STRIP: NEGATIVE
PH UR STRIP: 5.5 PH UNITS
PROT UR QL STRIP: 50
RBC # UR STRIP: NEGATIVE /UL
RBC #/AREA URNS HPF: 2 /HPF
SP GR UR STRIP: 1.02
SQUAMOUS #/AREA URNS LPF: ABNORMAL /HPF
UROBILINOGEN UR STRIP-ACNC: NORMAL MG/DL
WBC #/AREA URNS HPF: 4 /HPF

## 2023-02-01 PROCEDURE — 99215 OFFICE O/P EST HI 40 MIN: CPT | Mod: PBBFAC,25 | Performed by: NURSE PRACTITIONER

## 2023-02-01 PROCEDURE — 74018 XR KUB: ICD-10-PCS | Mod: 26,,, | Performed by: RADIOLOGY

## 2023-02-01 PROCEDURE — 87086 URINE CULTURE/COLONY COUNT: CPT | Mod: ,,, | Performed by: CLINICAL MEDICAL LABORATORY

## 2023-02-01 PROCEDURE — 74018 RADEX ABDOMEN 1 VIEW: CPT | Mod: TC

## 2023-02-01 PROCEDURE — 74176 CT ABDOMEN PELVIS WITHOUT CONTRAST: ICD-10-PCS | Mod: 26,,, | Performed by: RADIOLOGY

## 2023-02-01 PROCEDURE — 81001 URINALYSIS: ICD-10-PCS | Mod: ,,, | Performed by: CLINICAL MEDICAL LABORATORY

## 2023-02-01 PROCEDURE — 87086 CULTURE, URINE: ICD-10-PCS | Mod: ,,, | Performed by: CLINICAL MEDICAL LABORATORY

## 2023-02-01 PROCEDURE — 74018 RADEX ABDOMEN 1 VIEW: CPT | Mod: 26,,, | Performed by: RADIOLOGY

## 2023-02-01 PROCEDURE — 74176 CT ABD & PELVIS W/O CONTRAST: CPT | Mod: TC

## 2023-02-01 PROCEDURE — 99213 OFFICE O/P EST LOW 20 MIN: CPT | Mod: S$PBB,,, | Performed by: NURSE PRACTITIONER

## 2023-02-01 PROCEDURE — 99213 PR OFFICE/OUTPT VISIT, EST, LEVL III, 20-29 MIN: ICD-10-PCS | Mod: S$PBB,,, | Performed by: NURSE PRACTITIONER

## 2023-02-01 PROCEDURE — 74176 CT ABD & PELVIS W/O CONTRAST: CPT | Mod: 26,,, | Performed by: RADIOLOGY

## 2023-02-01 PROCEDURE — 81001 URINALYSIS AUTO W/SCOPE: CPT | Mod: ,,, | Performed by: CLINICAL MEDICAL LABORATORY

## 2023-02-01 NOTE — ASSESSMENT & PLAN NOTE
· Patient worked in for CT imaging:  No ureteral stones seen  · Patient will need 6-12 month surveillance of renal stones  · F/u prn s/s renal colic, reminding her that it is unilateral pain  · She has constipation on imaging, recommended she increase Miralax to daily;  Increase water to at least 1.5 liters daily;  And high fiber diet

## 2023-02-01 NOTE — ASSESSMENT & PLAN NOTE
· Incidental finding on CT imaging today.  Reviewed results and given information;  Diet recommendations  · F/u with PCP

## 2023-02-01 NOTE — PROGRESS NOTES
"Subjective:       Patient ID: Jonna Cunha is a 67 y.o. female.    Chief Complaint: Other (RF Dr. Quintero - kidney stone)    Ms. Cunha is a pleasant 66 yo female who was previously seen by Dr. Campbell for stone retrieval about 2020 with stent placement.  Since that time she has passed several stones without intervention.  She c/o of intermittent right flank pain over the last 10 days.  States this is similar to other stones passed, "0-10/10".  Last episode shortly after rooming.  She has been told by Endocrinologist in [place] that her thyroid gland could be responsible for the renal stones and that she needs a thyroidectomy at [facility].  She is to f/u with him when she is ready to proceed with surgery.  7 Right, 2 left - largest 6.5 x 6.5 mm on CT imaging from February of 2022.  Xray imaging for which this visit was based is not available.    Patient sent for KUB:  "4 mm right renal calculus suggested.  Subcentimeter calcification described on comparison x-ray at the level of L4 on the left is no longer visualized with certainty"  Patient worked in for CT Stone Protocol:  Imaging reviewed with Dr. Cardoza who agrees, no ureteral stone visualized.  [2/1/2023]-----------------------------------------------------------------------------------    Review of Systems   Constitutional: Negative.    HENT: Negative.     Cardiovascular: Negative.    Gastrointestinal: Negative.    Genitourinary:  Positive for flank pain, hematuria and pelvic pain. Negative for decreased urine volume, difficulty urinating, dysuria, enuresis, frequency, genital sores, urgency, vaginal bleeding, vaginal discharge and vaginal pain.        Right flank pain   Musculoskeletal:  Negative for back pain.   Skin:  Negative for rash.   Neurological: Negative.    Hematological: Negative.      Objective:      Physical Exam  Vitals and nursing note reviewed.   Constitutional:       General: She is not in acute distress.     Appearance: Normal appearance. She is " well-developed. She is obese. She is not ill-appearing, toxic-appearing or diaphoretic.   HENT:      Head: Normocephalic.   Eyes:      General: No scleral icterus.     Pupils: Pupils are equal, round, and reactive to light.   Cardiovascular:      Rate and Rhythm: Normal rate.      Heart sounds: No murmur heard.  Pulmonary:      Effort: Pulmonary effort is normal.   Abdominal:      General: Bowel sounds are normal. There is no distension.      Palpations: Abdomen is soft.      Tenderness: There is no abdominal tenderness. There is no right CVA tenderness or left CVA tenderness.   Musculoskeletal:      Right lower leg: No edema.      Left lower leg: No edema.   Skin:     General: Skin is warm and dry.      Coloration: Skin is not jaundiced or pale.      Findings: No rash.   Neurological:      Mental Status: She is alert and oriented to person, place, and time. Mental status is at baseline.   Psychiatric:         Mood and Affect: Mood normal.         Behavior: Behavior normal.         Thought Content: Thought content normal.         Judgment: Judgment normal.       Assessment:       1. Bilateral kidney stones    2. Kidney stones    3. Right flank pain    4. Hematuria, unspecified type    5. Abdominal pain, unspecified abdominal location    6. Diverticulitis          Plan:       Bilateral kidney stones  Patient worked in for CT imaging:  No ureteral stones seen  Patient will need 6-12 month surveillance of renal stones  F/u prn s/s renal colic, reminding her that it is unilateral pain  She has constipation on imaging, recommended she increase Miralax to daily;  Increase water to at least 1.5 liters daily;  And high fiber diet    Right flank pain  See above    Diverticulitis  Incidental finding on CT imaging today.  Reviewed results and given information;  Diet recommendations  F/u with PCP

## 2023-02-03 LAB — UA COMPLETE W REFLEX CULTURE PNL UR: NORMAL

## 2023-03-01 DIAGNOSIS — Z96.652 S/P TOTAL KNEE REPLACEMENT, LEFT: Primary | ICD-10-CM

## 2023-04-03 ENCOUNTER — OFFICE VISIT (OUTPATIENT)
Dept: ORTHOPEDICS | Facility: CLINIC | Age: 68
End: 2023-04-03
Payer: MEDICARE

## 2023-04-03 ENCOUNTER — HOSPITAL ENCOUNTER (OUTPATIENT)
Dept: RADIOLOGY | Facility: HOSPITAL | Age: 68
Discharge: HOME | End: 2023-04-03
Attending: NURSE PRACTITIONER
Payer: MEDICARE

## 2023-04-03 DIAGNOSIS — Z96.652 S/P TOTAL KNEE ARTHROPLASTY, LEFT: Primary | ICD-10-CM

## 2023-04-03 DIAGNOSIS — Z96.652 S/P TOTAL KNEE REPLACEMENT, LEFT: ICD-10-CM

## 2023-04-03 PROCEDURE — 99212 OFFICE O/P EST SF 10 MIN: CPT | Mod: S$PBB,,, | Performed by: NURSE PRACTITIONER

## 2023-04-03 PROCEDURE — 73560 X-RAY EXAM OF KNEE 1 OR 2: CPT | Mod: TC,LT

## 2023-04-03 PROCEDURE — 99213 OFFICE O/P EST LOW 20 MIN: CPT | Mod: PBBFAC | Performed by: NURSE PRACTITIONER

## 2023-04-03 PROCEDURE — 73560 X-RAY EXAM OF KNEE 1 OR 2: CPT | Mod: 26,LT,, | Performed by: RADIOLOGY

## 2023-04-03 PROCEDURE — 73560 XR KNEE 1 OR 2 VIEW LEFT: ICD-10-PCS | Mod: 26,LT,, | Performed by: RADIOLOGY

## 2023-04-03 PROCEDURE — 99212 PR OFFICE/OUTPT VISIT, EST, LEVL II, 10-19 MIN: ICD-10-PCS | Mod: S$PBB,,, | Performed by: NURSE PRACTITIONER

## 2023-04-03 RX ORDER — NAPROXEN 500 MG/1
500 TABLET ORAL 2 TIMES DAILY WITH MEALS
Qty: 60 TABLET | Refills: 0 | Status: SHIPPED | OUTPATIENT
Start: 2023-04-03

## 2023-04-03 NOTE — PROGRESS NOTES
CC:  Knee pain    67 y.o. Female returns to clinic for a follow up visit regarding knee pain.       Patient is 6 1/2 months post left total knee arthroplasty.   Patient reports she is having some pain in her knee. She states that the pain started a few weeks ago.   She states that from time to time her knee feels like it is going to give on her.  Reports she still has weakness in her left knee.  She denies any injury.     She had 6 weeks of PT initially after surgery.        Past Medical History:   Diagnosis Date    Arthritis     Bilateral kidney stones 2023    Diabetes mellitus, type 2     Diverticulitis 2023    Hypertension     Right flank pain 2023    Thyroid disease      Past Surgical History:   Procedure Laterality Date     SECTION      CHOLECYSTECTOMY      KNEE ARTHROSCOPY W/ MENISCECTOMY Left 2021    Procedure: ARTHROSCOPY, KNEE, WITH MENISCECTOMY;  Surgeon: Manuel Cruz MD;  Location: [facility];  Service: Orthopedics;  Laterality: Left;    REPAIR OF MENISCUS OF KNEE Left 2021    Procedure: REPAIR, MENISCUS, KNEE;  Surgeon: Manuel Cruz MD;  Location: [facility];  Service: Orthopedics;  Laterality: Left;    SPINE SURGERY      TUBAL LIGATION           PHYSICAL EXAMINATION:  There were no vitals taken for this visit.  General    Constitutional: She is oriented to person, place, and time. She appears well-developed and well-nourished.   HENT:   Head: Normocephalic and atraumatic.   Eyes: Pupils are equal, round, and reactive to light.   Cardiovascular:  Normal rate and intact distal pulses.            Pulmonary/Chest: Effort normal. No respiratory distress. She exhibits no tenderness.   Abdominal: Soft. There is no abdominal tenderness.   Neurological: She is alert and oriented to person, place, and time. She has normal reflexes.   Psychiatric: She has a normal mood and affect. Her behavior is normal. Thought content normal.           Right Knee Exam     Range of  Motion   Extension:  normal   Flexion:  normal     Tests   Ligament Examination   Lachman: normal (-1 to 2mm)     Left Knee Exam     Inspection   Erythema: absent  Scars: present  Swelling: absent  Effusion: absent  Deformity: absent  Bruising: absent    Range of Motion   Extension:  normal   Flexion:  normal     Tests   Meniscus   Robert:  Medial - negative Lateral - negative  Stability   Lachman: normal (-1 to 2mm)   PCL-Posterior Drawer: normal (0 to 2mm)  Patella   Patellar apprehension: negative  Patellar Tracking: normal  Q-Angle at 90 degrees: normal    Other   Meniscal Cyst: absent  Sensation: normal    Muscle Strength   Left Lower Extremity   Quadriceps:  5/5 and 4/5   Hamstrin/5     Vascular Exam       Left Pulses  Dorsalis Pedis:      2+  Posterior Tibial:      2+        IMAGING:  No results found.   TECHNIQUE:  XR KNEE 2 VIEW LEFT     FINDINGS:  There has beenprevious left knee arthroplasty. Arthroplasty components appear normal alignment. No periprosthetic fractures seen. No abnormality in the adjacent soft tissues.     Impression:     Expected appearance of left knee post-arthroplasty.        Electronically signed by: Yandel Redd  Date:                                            2023  Time:                                           10:25  ASSESSMENT:      ICD-10-CM ICD-9-CM   1. S/P total knee arthroplasty, left  Z96.652 V43.65       PLAN:     -Findings and treatment options were discussed with the patient  -All questions answered  Natural history and expected course discussed. Questions answered.  Educational materials distributed.  Reduction in offending activity.  Quad strengthening exercises.  NSAIDs per medication orders.  Patient would like to work on quad strengthening exercises at home.  She does not want to go to outpatient physical therapy at this time.  We will start her on a home exercise program and have her return to clinic in 2 months for recheck with Dr. Cruz.  Naproxen  p.o. b.i.d. as needed.    There are no Patient Instructions on file for this visit.      No orders of the defined types were placed in this encounter.        Procedures

## 2023-06-02 DIAGNOSIS — Z96.652 S/P TOTAL KNEE ARTHROPLASTY, LEFT: Primary | ICD-10-CM

## 2023-06-06 ENCOUNTER — HOSPITAL ENCOUNTER (OUTPATIENT)
Dept: RADIOLOGY | Facility: HOSPITAL | Age: 68
Discharge: HOME | End: 2023-06-06
Attending: ORTHOPAEDIC SURGERY
Payer: MEDICARE

## 2023-06-06 ENCOUNTER — OFFICE VISIT (OUTPATIENT)
Dept: ORTHOPEDICS | Facility: CLINIC | Age: 68
End: 2023-06-06
Payer: MEDICARE

## 2023-06-06 DIAGNOSIS — Z96.652 S/P TOTAL KNEE ARTHROPLASTY, LEFT: Primary | ICD-10-CM

## 2023-06-06 DIAGNOSIS — Z96.652 S/P TOTAL KNEE ARTHROPLASTY, LEFT: ICD-10-CM

## 2023-06-06 PROCEDURE — 99213 PR OFFICE/OUTPT VISIT, EST, LEVL III, 20-29 MIN: ICD-10-PCS | Mod: S$PBB,,, | Performed by: ORTHOPAEDIC SURGERY

## 2023-06-06 PROCEDURE — 73560 XR KNEE 1 OR 2 VIEW LEFT: ICD-10-PCS | Mod: 26,LT,, | Performed by: ORTHOPAEDIC SURGERY

## 2023-06-06 PROCEDURE — 73560 X-RAY EXAM OF KNEE 1 OR 2: CPT | Mod: 26,LT,, | Performed by: ORTHOPAEDIC SURGERY

## 2023-06-06 PROCEDURE — 99213 OFFICE O/P EST LOW 20 MIN: CPT | Mod: S$PBB,,, | Performed by: ORTHOPAEDIC SURGERY

## 2023-06-06 PROCEDURE — 99212 OFFICE O/P EST SF 10 MIN: CPT | Mod: PBBFAC | Performed by: ORTHOPAEDIC SURGERY

## 2023-06-06 PROCEDURE — 73560 X-RAY EXAM OF KNEE 1 OR 2: CPT | Mod: TC,LT

## 2023-06-06 NOTE — PROGRESS NOTES
CC:  Knee pain    67 y.o. Female returns to clinic for a follow up visit regarding knee pain. She had a TKA 6 months ago. She has fallen recently and broken her coccyx and has had increasing pain in the knee also. She has been going to therapy for strengthening until her fall. She is taking a break from therapy right now.             Past Medical History:   Diagnosis Date    Arthritis     Bilateral kidney stones 2023    Diabetes mellitus, type 2     Diverticulitis 2023    Hypertension     Right flank pain 2023    Thyroid disease      Past Surgical History:   Procedure Laterality Date     SECTION      CHOLECYSTECTOMY      KNEE ARTHROSCOPY W/ MENISCECTOMY Left 2021    Procedure: ARTHROSCOPY, KNEE, WITH MENISCECTOMY;  Surgeon: Manuel Cruz MD;  Location: [facility] OR;  Service: Orthopedics;  Laterality: Left;    REPAIR OF MENISCUS OF KNEE Left 2021    Procedure: REPAIR, MENISCUS, KNEE;  Surgeon: Manuel Cruz MD;  Location: [facility] OR;  Service: Orthopedics;  Laterality: Left;    SPINE SURGERY      TUBAL LIGATION           PHYSICAL EXAMINATION:  There were no vitals taken for this visit.  Ortho/SPM Exam  Examination left knee demonstrates no signs of instability she is stable to varus and valgus stress range of motion 0/0/1 115°.  Well-healed surgical incision.  Mild tenderness anteriorly over the patella    IMAGING:  X-Ray Knee 1 or 2 View Left    Result Date: 2023  See Procedure Notes for results. IMPRESSION: Please see Ortho procedure notes for report.  This procedure was auto-finalized by: Virtual Radiologist     Two views left knee were obtained today demonstrating the presence of a cemented left total knee arthroplasty which appears to be in excellent alignment with no signs of loosening no evidence of fracture  ASSESSMENT:      ICD-10-CM ICD-9-CM   1. S/P total knee arthroplasty, left  Z96.652 V43.65       PLAN:     -Findings and treatment options were discussed  with the patient  -All questions answered  Natural history and expected course discussed. Questions answered.  Educational materials distributed.  Patellar compression sleeve.  I think she just has a contusion over the patella has some prepatellar bursitis.  The use of a compression sleeve and anti-inflammatories as well as topical Voltaren gel were discussed she voiced her understanding will see back in 4 months for 1 year follow-up    There are no Patient Instructions on file for this visit.      No orders of the defined types were placed in this encounter.        Procedures

## 2023-08-28 ENCOUNTER — HOSPITAL ENCOUNTER (EMERGENCY)
Facility: HOSPITAL | Age: 68
Discharge: HOME | End: 2023-08-28
Attending: EMERGENCY MEDICINE
Payer: MEDICARE

## 2023-08-28 VITALS
HEART RATE: 65 BPM | HEIGHT: 65 IN | WEIGHT: 200.5 LBS | BODY MASS INDEX: 33.41 KG/M2 | DIASTOLIC BLOOD PRESSURE: 52 MMHG | OXYGEN SATURATION: 98 % | SYSTOLIC BLOOD PRESSURE: 130 MMHG | RESPIRATION RATE: 16 BRPM | TEMPERATURE: 98 F

## 2023-08-28 DIAGNOSIS — E11.65 UNCONTROLLED TYPE 2 DIABETES MELLITUS WITH HYPERGLYCEMIA: ICD-10-CM

## 2023-08-28 DIAGNOSIS — I10 UNCONTROLLED HYPERTENSION: Primary | ICD-10-CM

## 2023-08-28 LAB
GLUCOSE SERPL-MCNC: 215 MG/DL (ref 70–105)
GLUCOSE SERPL-MCNC: 302 MG/DL (ref 70–105)
GLUCOSE SERPL-MCNC: 394 MG/DL (ref 70–105)

## 2023-08-28 PROCEDURE — 25000003 PHARM REV CODE 250: Performed by: EMERGENCY MEDICINE

## 2023-08-28 PROCEDURE — 96376 TX/PRO/DX INJ SAME DRUG ADON: CPT

## 2023-08-28 PROCEDURE — 63600175 PHARM REV CODE 636 W HCPCS: Mod: GZ | Performed by: EMERGENCY MEDICINE

## 2023-08-28 PROCEDURE — 99284 EMERGENCY DEPT VISIT MOD MDM: CPT | Mod: 25

## 2023-08-28 PROCEDURE — 96374 THER/PROPH/DIAG INJ IV PUSH: CPT

## 2023-08-28 PROCEDURE — 82962 GLUCOSE BLOOD TEST: CPT

## 2023-08-28 PROCEDURE — 99284 EMERGENCY DEPT VISIT MOD MDM: CPT | Performed by: EMERGENCY MEDICINE

## 2023-08-28 PROCEDURE — 96375 TX/PRO/DX INJ NEW DRUG ADDON: CPT

## 2023-08-28 RX ORDER — HYDRALAZINE HYDROCHLORIDE 20 MG/ML
20 INJECTION INTRAMUSCULAR; INTRAVENOUS
Status: COMPLETED | OUTPATIENT
Start: 2023-08-28 | End: 2023-08-28

## 2023-08-28 RX ORDER — CLONIDINE HYDROCHLORIDE 0.1 MG/1
0.2 TABLET ORAL
Status: COMPLETED | OUTPATIENT
Start: 2023-08-28 | End: 2023-08-28

## 2023-08-28 RX ADMIN — CLONIDINE HYDROCHLORIDE 0.2 MG: 0.1 TABLET ORAL at 08:08

## 2023-08-28 RX ADMIN — HUMAN INSULIN 10 UNITS: 100 INJECTION, SOLUTION SUBCUTANEOUS at 10:08

## 2023-08-28 RX ADMIN — HUMAN INSULIN 10 UNITS: 100 INJECTION, SOLUTION SUBCUTANEOUS at 09:08

## 2023-08-28 RX ADMIN — HYDRALAZINE HYDROCHLORIDE 20 MG: 20 INJECTION INTRAMUSCULAR; INTRAVENOUS at 08:08

## 2023-08-29 NOTE — ED PROVIDER NOTES
Encounter Date: 2023       History     Chief Complaint   Patient presents with    Hypertension    Headache     Pain to shoulders     Patient presents with elevated blood pressure.  States she saw her thyroid doctor today, and they noted her blood pressure was elevated despite current treatment and they added hydralazine 30 mg 3 times daily to her usual regimen.  She was unable to fill the prescription this evening because the pharmacy was already closed, she checked her blood pressure at home and it was higher than 200/100.  She is not having any chest pain or shortness of breath.  Has a mild headache.      Review of patient's allergies indicates:   Allergen Reactions    Corticosteroids (glucocorticoids)      Past Medical History:   Diagnosis Date    Arthritis     Bilateral kidney stones 2023    Diabetes mellitus, type 2     Diverticulitis 2023    Hypertension     Right flank pain 2023    Thyroid disease      Past Surgical History:   Procedure Laterality Date     SECTION      CHOLECYSTECTOMY      KNEE ARTHROSCOPY W/ MENISCECTOMY Left 2021    Procedure: ARTHROSCOPY, KNEE, WITH MENISCECTOMY;  Surgeon: Manuel Cruz MD;  Location: [facility];  Service: Orthopedics;  Laterality: Left;    REPAIR OF MENISCUS OF KNEE Left 2021    Procedure: REPAIR, MENISCUS, KNEE;  Surgeon: Manuel Cruz MD;  Location: [facility] OR;  Service: Orthopedics;  Laterality: Left;    SPINE SURGERY      TUBAL LIGATION       Family History   Problem Relation Age of Onset    Cancer Mother     Cancer Father      Social History     Tobacco Use    Smoking status: Never    Smokeless tobacco: Never   Substance Use Topics    Alcohol use: Not Currently    Drug use: Never     Comment: Prescribed by Fabby Sheffield     Review of Systems   Constitutional: Negative.  Negative for chills, diaphoresis and fatigue.   HENT: Negative.     Eyes: Negative.    Respiratory: Negative.  Negative for apnea, cough, choking,  chest tightness, shortness of breath, wheezing and stridor.    Cardiovascular: Negative.  Negative for chest pain, palpitations and leg swelling.   Gastrointestinal: Negative.  Negative for nausea and vomiting.   Genitourinary: Negative.    Musculoskeletal: Negative.  Negative for back pain, gait problem, joint swelling, neck pain and neck stiffness.        Patient reports pain posterior right shoulder, mild.   Skin: Negative.    Neurological:  Positive for headaches. Negative for dizziness, tremors, seizures, syncope, facial asymmetry, speech difficulty, weakness, light-headedness and numbness.   Psychiatric/Behavioral: Negative.     All other systems reviewed and are negative.      Physical Exam     Initial Vitals [08/28/23 2031]   BP Pulse Resp Temp SpO2   (!) 201/87 77 18 98 °F (36.7 °C) 99 %      MAP       --         Physical Exam    Nursing note and vitals reviewed.  Constitutional: She appears well-developed and well-nourished. She is not diaphoretic. No distress.   HENT:   Right Ear: External ear normal.   Left Ear: External ear normal.   Nose: Nose normal.   Mouth/Throat: Oropharynx is clear and moist. No oropharyngeal exudate.   Eyes: Conjunctivae and EOM are normal. Pupils are equal, round, and reactive to light.   Neck: Neck supple. No JVD present.   Normal range of motion.  Cardiovascular:  Normal rate, regular rhythm, normal heart sounds and intact distal pulses.           No murmur heard.  Pulmonary/Chest: Breath sounds normal. No stridor. No respiratory distress. She has no wheezes. She has no rhonchi. She has no rales.   Abdominal: Abdomen is soft. Bowel sounds are normal. She exhibits no distension. There is no abdominal tenderness.   Musculoskeletal:         General: Tenderness (patient has tenderness and mild spasm of the rhomboid muscle on the right side.) present. No edema. Normal range of motion.      Cervical back: Normal range of motion and neck supple.     Lymphadenopathy:     She has no  cervical adenopathy.   Neurological: She is alert and oriented to person, place, and time. She has normal strength. No cranial nerve deficit or sensory deficit. GCS score is 15. GCS eye subscore is 4. GCS verbal subscore is 5. GCS motor subscore is 6.   Skin: Skin is warm and dry. Capillary refill takes less than 2 seconds. No rash noted. No erythema. No pallor.   Psychiatric: She has a normal mood and affect. Her behavior is normal.         Medical Screening Exam   See Full Note    ED Course   Procedures  Labs Reviewed   POCT GLUCOSE MONITORING CONTINUOUS - Abnormal; Notable for the following components:       Result Value    POC Glucose 394 (*)     All other components within normal limits   POCT GLUCOSE MONITORING CONTINUOUS - Abnormal; Notable for the following components:    POC Glucose 302 (*)     All other components within normal limits   POCT GLUCOSE MONITORING CONTINUOUS          Imaging Results    None          Medications   hydrALAZINE injection 20 mg (20 mg Intravenous Given 8/28/23 2053)   cloNIDine tablet 0.2 mg (0.2 mg Oral Given 8/28/23 2052)   insulin regular injection 10 Units 0.1 mL (10 Units Intravenous Given 8/28/23 2137)   insulin regular injection 10 Units 0.1 mL (10 Units Intravenous Given 8/28/23 2226)     Medical Decision Making  Patient presents with elevated blood pressure both here and at home.    Fingerstick blood sugar noted to be 394 mg/dL.  Patient was given 10 units IV insulin, will repeat glucose level in 30 minutes.  Patient has not yet taken her evening dose of Lantus.    Patient was treated with a 2nd dose of IV insulin 10 units, repeat blood sugar after the 1st dose was 304 mg/dL.  Will repeat glucose and 30 minutes.  Repeat glucose is now down to 215 mg/dL.    Problems Addressed:  Uncontrolled hypertension: chronic illness or injury that poses a threat to life or bodily functions     Details: Patient currently on medication, hydralazine added today by her endocrinologist,  however, patient unable to  prescription and presented to the emergency department with return of elevated blood pressures as was seen in the physician's office today.  Patient was given hydralazine IV in the emergency department as well as oral clonidine.  Instructed to fill her prescriptions in the morning and follow-up with her primary physician in the office to recheck blood pressure.  Uncontrolled type 2 diabetes mellitus with hyperglycemia:     Details: Patient treated with IV insulin and instructed to follow up with primary physician for further adjustment of medications after review of glucose log.    Amount and/or Complexity of Data Reviewed  Discussion of management or test interpretation with external provider(s): Patient was treated with IV hydralazine and IV insulin.  She was given p.o. clonidine.  Blood pressure and blood sugar have come down.  Patient instructed to follow up with her primary physician.  Discharge and follow up instructions given.    Risk  OTC drugs.  Prescription drug management.                               Clinical Impression:   Final diagnoses:  [I10] Uncontrolled hypertension (Primary)  [E11.65] Uncontrolled type 2 diabetes mellitus with hyperglycemia        ED Disposition Condition    Discharge Stable          ED Prescriptions    None       Follow-up Information       Follow up With Specialties Details Why Contact Info    Dahiana Quintero MD Family Medicine Schedule an appointment as soon as possible for a visit in 1 day To recheck; sooner if worse, not improving, or if any new symptoms.  Repeat blood pressure and blood sugar with your doctor, you may need your medications adjusted. [id number]  [facility] [id number]  [phone]               Rafi Wilson DO  08/28/23 8694

## 2023-08-29 NOTE — ED TRIAGE NOTES
Pt to Er with c/o elevated blood pressure pt saw her thyroid doctor in [place] today and was sent to the Er at [facility] for her blood pressure. Pt was given a RS of Hydralazine 25mg and was unable to fill due to time of day. Pt has been home and her systolic b/p has been elevated over 200 since ER visit. ER doctor told pt to come to the ER if her blood pressure stayed elevated. Pt is having pain to shoulders and headache. Pain is a 5 on 0-10 scale

## 2023-10-04 DIAGNOSIS — Z96.652 S/P TOTAL KNEE ARTHROPLASTY, LEFT: Primary | ICD-10-CM

## 2024-03-26 ENCOUNTER — TELEPHONE (OUTPATIENT)
Dept: GASTROENTEROLOGY | Facility: CLINIC | Age: 69
End: 2024-03-26
Payer: MEDICARE

## 2024-05-02 DIAGNOSIS — Z12.11 COLON CANCER SCREENING: Primary | ICD-10-CM

## 2024-05-05 RX ORDER — POLYETHYLENE GLYCOL 3350, SODIUM SULFATE ANHYDROUS, SODIUM BICARBONATE, SODIUM CHLORIDE, POTASSIUM CHLORIDE 236; 22.74; 6.74; 5.86; 2.97 G/4L; G/4L; G/4L; G/4L; G/4L
4 POWDER, FOR SOLUTION ORAL ONCE
Qty: 4000 ML | Refills: 0 | Status: SHIPPED | OUTPATIENT
Start: 2024-05-05 | End: 2024-05-05

## 2024-06-06 DIAGNOSIS — M25.511 RIGHT SHOULDER PAIN: Primary | ICD-10-CM

## 2024-06-20 DIAGNOSIS — M25.511 ACUTE PAIN OF RIGHT SHOULDER: Primary | ICD-10-CM

## 2024-07-03 ENCOUNTER — TELEPHONE (OUTPATIENT)
Dept: ORTHOPEDICS | Facility: CLINIC | Age: 69
End: 2024-07-03
Payer: MEDICARE

## 2024-12-04 ENCOUNTER — TELEPHONE (OUTPATIENT)
Dept: VASCULAR SURGERY | Facility: CLINIC | Age: 69
End: 2024-12-04
Payer: MEDICARE

## 2025-01-16 ENCOUNTER — INITIAL CONSULT (OUTPATIENT)
Dept: VASCULAR SURGERY | Facility: CLINIC | Age: 70
End: 2025-01-16
Payer: MEDICARE

## 2025-01-16 VITALS — WEIGHT: 182 LBS | HEIGHT: 66 IN | BODY MASS INDEX: 29.25 KG/M2

## 2025-01-16 DIAGNOSIS — G45.3 AMAUROSIS FUGAX OF RIGHT EYE: ICD-10-CM

## 2025-01-16 DIAGNOSIS — I65.23 BILATERAL CAROTID ARTERY STENOSIS: Primary | ICD-10-CM

## 2025-01-16 PROCEDURE — 99203 OFFICE O/P NEW LOW 30 MIN: CPT | Mod: S$PBB,,, | Performed by: SURGERY

## 2025-01-16 PROCEDURE — 99999 PR PBB SHADOW E&M-EST. PATIENT-LVL IV: CPT | Mod: PBBFAC,,, | Performed by: SURGERY

## 2025-01-16 PROCEDURE — 99214 OFFICE O/P EST MOD 30 MIN: CPT | Mod: PBBFAC | Performed by: SURGERY

## 2025-01-16 RX ORDER — APIXABAN 5 MG/1
5 TABLET, FILM COATED ORAL
COMMUNITY
Start: 2024-10-30

## 2025-01-16 RX ORDER — ROSUVASTATIN CALCIUM 20 MG/1
20 TABLET, COATED ORAL
Status: ON HOLD | COMMUNITY
Start: 2024-10-30 | End: 2025-01-27 | Stop reason: HOSPADM

## 2025-01-16 RX ORDER — EMPAGLIFLOZIN 10 MG/1
10 TABLET, FILM COATED ORAL
COMMUNITY
Start: 2024-11-07

## 2025-01-16 RX ORDER — CARVEDILOL 12.5 MG/1
12.5 TABLET ORAL 2 TIMES DAILY
COMMUNITY

## 2025-01-16 RX ORDER — SULFAMETHOXAZOLE AND TRIMETHOPRIM 800; 160 MG/1; MG/1
TABLET ORAL
COMMUNITY
Start: 2025-01-13 | End: 2025-01-25 | Stop reason: CLARIF

## 2025-01-16 RX ORDER — INSULIN GLARGINE 300 U/ML
INJECTION, SOLUTION SUBCUTANEOUS
COMMUNITY

## 2025-01-16 RX ORDER — INSULIN ASPART INJECTION 100 [IU]/ML
12 INJECTION, SOLUTION SUBCUTANEOUS
COMMUNITY
Start: 2024-05-23

## 2025-01-16 NOTE — PROGRESS NOTES
"Subjective:       Patient ID: Jonna Cunha is a 69 y.o. female.    Chief Complaint: stenosis (carotid)  Right ICA greater than 70% stenosis.  Per Radiology CTA angio of the carotids.  Patient relates episode of amaurosis fugax on the right.  Other medical history includes hypertension some popliteal patient currently has a LINX cardiac monitoring placed.  Past surgical history cholecystectomy .  Patient has diabetic contemplating right carotid endarterectomy she was placed on aspirin and Eliquis after her amaurosis episode going to call Dr. Mccoy office and see if she was cleared from cardiac standpoint she will continue the aspirin Eliquis till then  family history includes Cancer in her father and mother.  Past Medical History:   Diagnosis Date    Arthritis     Bilateral kidney stones 2023    Diabetes mellitus, type 2     Diverticulitis 2023    Hypertension     Right flank pain 2023    Thyroid disease       Past Surgical History:   Procedure Laterality Date     SECTION      CHOLECYSTECTOMY      KNEE ARTHROSCOPY W/ MENISCECTOMY Left 2021    Procedure: ARTHROSCOPY, KNEE, WITH MENISCECTOMY;  Surgeon: Manuel Cruz MD;  Location: [facility];  Service: Orthopedics;  Laterality: Left;    REPAIR OF MENISCUS OF KNEE Left 2021    Procedure: REPAIR, MENISCUS, KNEE;  Surgeon: Manuel Cruz MD;  Location: [facility];  Service: Orthopedics;  Laterality: Left;    SPINE SURGERY      TUBAL LIGATION         reports that she has never smoked. She has never used smokeless tobacco. She reports that she does not currently use alcohol. She reports that she does not use drugs.   HPI  Review of Systems      Objective:      Ht 5' 6" (1.676 m)   Wt 82.6 kg (182 lb)   BMI 29.38 kg/m²    Physical Exam  Exam conducted with a chaperone present.   Constitutional:       Appearance: Normal appearance.   HENT:      Head: Normocephalic and atraumatic.   Cardiovascular:      Rate and " Rhythm: Normal rate.      Pulses: Normal pulses.   Pulmonary:      Effort: Pulmonary effort is normal.   Abdominal:      General: Abdomen is flat. Bowel sounds are normal.      Palpations: Abdomen is soft.   Musculoskeletal:      Cervical back: Normal range of motion. No rigidity or tenderness.   Skin:     General: Skin is warm and dry.      Capillary Refill: Capillary refill takes less than 2 seconds.   Neurological:      General: No focal deficit present.      Mental Status: She is alert.   Psychiatric:         Mood and Affect: Mood normal.         Behavior: Behavior normal.         Thought Content: Thought content normal.         Judgment: Judgment normal.           Assessment:       1. Bilateral carotid artery stenosis    2. Amaurosis fugax of right eye        Plan:       Continue aspirin Eliquis, speak with Dr. Mccoy off since she will be cleared for right carotid endarterectomy    She desires to have this at [facility]

## 2025-01-25 ENCOUNTER — HOSPITAL ENCOUNTER (EMERGENCY)
Facility: HOSPITAL | Age: 70
Discharge: ANOTHER HOSPITAL | End: 2025-01-26
Attending: INTERNAL MEDICINE
Payer: MEDICARE

## 2025-01-25 DIAGNOSIS — E21.5 PARATHYROID RELATED HYPERCALCEMIA: ICD-10-CM

## 2025-01-25 DIAGNOSIS — N18.4 ACUTE RENAL FAILURE WITH OTHER SPECIFIED PATHOLOGICAL KIDNEY LESION SUPERIMPOSED ON STAGE 4 CHRONIC KIDNEY DISEASE: Primary | ICD-10-CM

## 2025-01-25 DIAGNOSIS — N17.8 ACUTE RENAL FAILURE WITH OTHER SPECIFIED PATHOLOGICAL KIDNEY LESION SUPERIMPOSED ON STAGE 4 CHRONIC KIDNEY DISEASE: Primary | ICD-10-CM

## 2025-01-25 DIAGNOSIS — E83.52 PARATHYROID RELATED HYPERCALCEMIA: ICD-10-CM

## 2025-01-25 DIAGNOSIS — I16.0 HYPERTENSIVE URGENCY: ICD-10-CM

## 2025-01-25 DIAGNOSIS — I10 HYPERTENSION: ICD-10-CM

## 2025-01-25 DIAGNOSIS — E87.5 HYPERKALEMIA: ICD-10-CM

## 2025-01-25 LAB
ANION GAP SERPL CALCULATED.3IONS-SCNC: 13 MMOL/L (ref 7–16)
BASOPHILS # BLD AUTO: 0.04 K/UL (ref 0–0.2)
BASOPHILS NFR BLD AUTO: 0.7 % (ref 0–1)
BUN SERPL-MCNC: 41 MG/DL (ref 10–20)
BUN/CREAT SERPL: 21 (ref 6–20)
CALCIUM SERPL-MCNC: 10.9 MG/DL (ref 8.4–10.2)
CHLORIDE SERPL-SCNC: 113 MMOL/L (ref 98–107)
CO2 SERPL-SCNC: 20 MMOL/L (ref 23–31)
CREAT SERPL-MCNC: 1.97 MG/DL (ref 0.55–1.02)
DIFFERENTIAL METHOD BLD: ABNORMAL
EGFR (NO RACE VARIABLE) (RUSH/TITUS): 27 ML/MIN/1.73M2
EOSINOPHIL # BLD AUTO: 0.09 K/UL (ref 0–0.5)
EOSINOPHIL NFR BLD AUTO: 1.6 % (ref 1–4)
ERYTHROCYTE [DISTWIDTH] IN BLOOD BY AUTOMATED COUNT: 12.5 % (ref 11.5–14.5)
GLUCOSE SERPL-MCNC: 209 MG/DL (ref 70–105)
GLUCOSE SERPL-MCNC: 285 MG/DL (ref 82–115)
GLUCOSE SERPL-MCNC: 305 MG/DL (ref 70–105)
HCO3 UR-SCNC: 23.5 MMOL/L (ref 21–28)
HCT VFR BLD AUTO: 29.9 % (ref 38–47)
HGB BLD-MCNC: 10 G/DL (ref 12–16)
IMM GRANULOCYTES # BLD AUTO: 0.01 K/UL (ref 0–0.04)
IMM GRANULOCYTES NFR BLD: 0.2 % (ref 0–0.4)
LYMPHOCYTES # BLD AUTO: 2.02 K/UL (ref 1–4.8)
LYMPHOCYTES NFR BLD AUTO: 36.1 % (ref 27–41)
MCH RBC QN AUTO: 29.2 PG (ref 27–31)
MCHC RBC AUTO-ENTMCNC: 33.4 G/DL (ref 32–36)
MCV RBC AUTO: 87.2 FL (ref 80–96)
MONOCYTES # BLD AUTO: 0.28 K/UL (ref 0–0.8)
MONOCYTES NFR BLD AUTO: 5 % (ref 2–6)
MPC BLD CALC-MCNC: 10.6 FL (ref 9.4–12.4)
NEUTROPHILS # BLD AUTO: 3.15 K/UL (ref 1.8–7.7)
NEUTROPHILS NFR BLD AUTO: 56.4 % (ref 53–65)
NRBC # BLD AUTO: 0 X10E3/UL
NRBC, AUTO (.00): 0 %
PCO2 BLDA: 38 MMHG (ref 35–48)
PH SMN: 7.4 [PH] (ref 7.35–7.45)
PLATELET # BLD AUTO: 193 K/UL (ref 150–400)
PO2 BLDA: 101 MMHG (ref 83–108)
POC BASE EXCESS: -1.1 MMOL/L (ref -2–3)
POC SATURATED O2: 98 %
POTASSIUM SERPL-SCNC: 5.7 MMOL/L (ref 3.5–5.1)
POTASSIUM SERPL-SCNC: 5.9 MMOL/L (ref 3.5–5.1)
RBC # BLD AUTO: 3.43 M/UL (ref 4.2–5.4)
SODIUM SERPL-SCNC: 140 MMOL/L (ref 136–145)
TROPONIN I SERPL HS-MCNC: <2.7 NG/L
WBC # BLD AUTO: 5.59 K/UL (ref 4.5–11)

## 2025-01-25 PROCEDURE — 96361 HYDRATE IV INFUSION ADD-ON: CPT

## 2025-01-25 PROCEDURE — 82962 GLUCOSE BLOOD TEST: CPT

## 2025-01-25 PROCEDURE — 85025 COMPLETE CBC W/AUTO DIFF WBC: CPT | Performed by: INTERNAL MEDICINE

## 2025-01-25 PROCEDURE — 99900035 HC TECH TIME PER 15 MIN (STAT)

## 2025-01-25 PROCEDURE — 36415 COLL VENOUS BLD VENIPUNCTURE: CPT | Performed by: INTERNAL MEDICINE

## 2025-01-25 PROCEDURE — 93010 ELECTROCARDIOGRAM REPORT: CPT | Mod: ,,, | Performed by: INTERNAL MEDICINE

## 2025-01-25 PROCEDURE — 25000003 PHARM REV CODE 250: Performed by: INTERNAL MEDICINE

## 2025-01-25 PROCEDURE — 82803 BLOOD GASES ANY COMBINATION: CPT

## 2025-01-25 PROCEDURE — 93005 ELECTROCARDIOGRAM TRACING: CPT

## 2025-01-25 PROCEDURE — 84484 ASSAY OF TROPONIN QUANT: CPT | Performed by: INTERNAL MEDICINE

## 2025-01-25 PROCEDURE — 63600175 PHARM REV CODE 636 W HCPCS: Performed by: INTERNAL MEDICINE

## 2025-01-25 PROCEDURE — 36600 WITHDRAWAL OF ARTERIAL BLOOD: CPT

## 2025-01-25 PROCEDURE — 84132 ASSAY OF SERUM POTASSIUM: CPT | Performed by: INTERNAL MEDICINE

## 2025-01-25 PROCEDURE — 96372 THER/PROPH/DIAG INJ SC/IM: CPT | Performed by: INTERNAL MEDICINE

## 2025-01-25 PROCEDURE — 80048 BASIC METABOLIC PNL TOTAL CA: CPT | Performed by: INTERNAL MEDICINE

## 2025-01-25 PROCEDURE — 96374 THER/PROPH/DIAG INJ IV PUSH: CPT

## 2025-01-25 PROCEDURE — 96375 TX/PRO/DX INJ NEW DRUG ADDON: CPT

## 2025-01-25 PROCEDURE — 99285 EMERGENCY DEPT VISIT HI MDM: CPT | Mod: 25

## 2025-01-25 PROCEDURE — 99285 EMERGENCY DEPT VISIT HI MDM: CPT | Mod: EDII,,, | Performed by: INTERNAL MEDICINE

## 2025-01-25 RX ORDER — GLUCAGON 1 MG
1 KIT INJECTION
Status: DISCONTINUED | OUTPATIENT
Start: 2025-01-25 | End: 2025-01-26 | Stop reason: HOSPADM

## 2025-01-25 RX ORDER — IBUPROFEN 200 MG
24 TABLET ORAL
Status: DISCONTINUED | OUTPATIENT
Start: 2025-01-25 | End: 2025-01-26 | Stop reason: HOSPADM

## 2025-01-25 RX ORDER — SODIUM BICARBONATE 1 MEQ/ML
50 SYRINGE (ML) INTRAVENOUS
Status: COMPLETED | OUTPATIENT
Start: 2025-01-25 | End: 2025-01-25

## 2025-01-25 RX ORDER — INSULIN ASPART 100 [IU]/ML
0-5 INJECTION, SOLUTION INTRAVENOUS; SUBCUTANEOUS
Status: DISCONTINUED | OUTPATIENT
Start: 2025-01-25 | End: 2025-01-26 | Stop reason: HOSPADM

## 2025-01-25 RX ORDER — ASPIRIN 81 MG/1
81 TABLET ORAL ONCE
COMMUNITY

## 2025-01-25 RX ORDER — BACLOFEN 20 MG/1
20 TABLET ORAL 3 TIMES DAILY
COMMUNITY
Start: 2025-01-24

## 2025-01-25 RX ORDER — IBUPROFEN 200 MG
16 TABLET ORAL
Status: DISCONTINUED | OUTPATIENT
Start: 2025-01-25 | End: 2025-01-26 | Stop reason: HOSPADM

## 2025-01-25 RX ADMIN — SODIUM CHLORIDE 500 ML: 9 INJECTION, SOLUTION INTRAVENOUS at 02:01

## 2025-01-25 RX ADMIN — SODIUM POLYSTYRENE SULFONATE 15 G: 15 SUSPENSION ORAL; RECTAL at 03:01

## 2025-01-25 RX ADMIN — SODIUM BICARBONATE 50 MEQ: 84 INJECTION INTRAVENOUS at 03:01

## 2025-01-25 RX ADMIN — INSULIN ASPART 2 UNITS: 100 INJECTION, SOLUTION INTRAVENOUS; SUBCUTANEOUS at 09:01

## 2025-01-25 RX ADMIN — HUMAN INSULIN 5 UNITS: 100 INJECTION, SOLUTION SUBCUTANEOUS at 03:01

## 2025-01-25 NOTE — ED PROVIDER NOTES
Encounter Date: 2025       History     Chief Complaint   Patient presents with    Hyperkalemia     Patient had a potassium level drawn yesterday at 4:00 a.m. that was elevated according to the clinic and referred here.  She denies any chest pain, shortness breath, weakness, cramps, nausea vomiting.  Patient is not on any fluid pills or on potassium supplements.    The patient was diagnosed with chronic kidney disease by Dr. Quintero in .  She was referred to Dr. Raj Mccoy, [facility] and was seen January 10, 2023.  She does not remember that visit and has not follow back up with a renal doctor since that time.  She does have problems though with the hypercalcemia from hypoparathyroidism.        Review of patient's allergies indicates:   Allergen Reactions    Corticosteroids (glucocorticoids)      Past Medical History:   Diagnosis Date    Bilateral kidney stones 2023    Degeneration of lumbosacral intervertebral disc 2022    Diabetes mellitus, type 2     Diverticulitis 2023    Essential hypertension 2022    Lumbosacral radiculopathy 2022    Osteoarthritis     Primary hyperparathyroidism 2022    Spinal stenosis of lumbar region 2022    Thyroid disease      Past Surgical History:   Procedure Laterality Date     SECTION      CHOLECYSTECTOMY      KNEE ARTHROSCOPY W/ MENISCECTOMY Left 2021    Procedure: ARTHROSCOPY, KNEE, WITH MENISCECTOMY;  Surgeon: Manuel Cruz MD;  Location: [facility];  Service: Orthopedics;  Laterality: Left;    REPAIR OF MENISCUS OF KNEE Left 2021    Procedure: REPAIR, MENISCUS, KNEE;  Surgeon: Manuel Cruz MD;  Location: [facility];  Service: Orthopedics;  Laterality: Left;    SPINE SURGERY      TUBAL LIGATION       Family History   Problem Relation Name Age of Onset    Cancer Mother      Cancer Father       Social History     Tobacco Use    Smoking status: Never    Smokeless tobacco: Never    Substance Use Topics    Alcohol use: Not Currently    Drug use: Never     Comment: Prescribed by Fabby Sheffield     Review of Systems   Constitutional:  Negative for fever.   HENT:  Negative for sore throat.    Respiratory:  Negative for shortness of breath.    Cardiovascular:  Negative for chest pain.   Gastrointestinal:  Negative for nausea.   Genitourinary:  Negative for dysuria.   Musculoskeletal:  Negative for back pain.   Skin:  Negative for rash.   Neurological:  Negative for weakness.   Hematological:  Does not bruise/bleed easily.       Physical Exam     Initial Vitals [01/25/25 1355]   BP Pulse Resp Temp SpO2   (!) 155/80 (!) 59 18 97.6 °F (36.4 °C) 100 %      MAP       --         Physical Exam    Vitals reviewed.  Constitutional: She appears well-developed.   Eyes: Pupils are equal, round, and reactive to light.   Neck: Trachea normal. Neck supple.   Normal range of motion.   Full passive range of motion without pain.     Cardiovascular:  Normal rate, regular rhythm, normal heart sounds and normal pulses.           Pulmonary/Chest: Effort normal and breath sounds normal.   Abdominal: Abdomen is soft.   Musculoskeletal:         General: Normal range of motion.      Cervical back: Full passive range of motion without pain, normal range of motion and neck supple.     Neurological: She is alert. She has normal strength and normal reflexes. No cranial nerve deficit. GCS eye subscore is 4. GCS verbal subscore is 5. GCS motor subscore is 6.   Skin: Skin is warm.   Psychiatric: She has a normal mood and affect.         Medical Screening Exam   See Full Note    ED Course   Procedures  Labs Reviewed   BASIC METABOLIC PANEL - Abnormal       Result Value    Sodium 140      Potassium 5.9 (*)     Chloride 113 (*)     CO2 20 (*)     Anion Gap 13      Glucose 285 (*)     BUN 41 (*)     Creatinine 1.97 (*)     BUN/Creatinine Ratio 21 (*)     Calcium 10.9 (*)     eGFR 27 (*)    CBC WITH DIFFERENTIAL - Abnormal    WBC 5.59       RBC 3.43 (*)     Hemoglobin 10.0 (*)     Hematocrit 29.9 (*)     MCV 87.2      MCH 29.2      MCHC 33.4      RDW 12.5      Platelet Count 193      MPV 10.6      Neutrophils % 56.4      Lymphocytes % 36.1      Monocytes % 5.0      Eosinophils % 1.6      Basophils % 0.7      Immature Granulocytes % 0.2      nRBC, Auto 0.0      Neutrophils, Abs 3.15      Lymphocytes, Absolute 2.02      Monocytes, Absolute 0.28      Eosinophils, Absolute 0.09      Basophils, Absolute 0.04      Immature Granulocytes, Absolute 0.01      nRBC, Absolute 0.00      Diff Type Auto     POTASSIUM - Abnormal    Potassium 5.7 (*)    BASIC METABOLIC PANEL - Abnormal    Sodium 142      Potassium 5.7 (*)     Chloride 114 (*)     CO2 21 (*)     Anion Gap 13      Glucose 138 (*)     BUN 34 (*)     Creatinine 1.46 (*)     BUN/Creatinine Ratio 23 (*)     Calcium 10.3 (*)     eGFR 39 (*)    POCT GLUCOSE MONITORING CONTINUOUS - Abnormal    POC Glucose 305 (*)    POCT GLUCOSE MONITORING CONTINUOUS - Abnormal    POC Glucose 209 (*)    POCT GLUCOSE MONITORING CONTINUOUS - Abnormal    POC Glucose 140 (*)    TROPONIN I - Normal    Troponin I High Sensitivity <2.7     CBC W/ AUTO DIFFERENTIAL    Narrative:     The following orders were created for panel order CBC auto differential.  Procedure                               Abnormality         Status                     ---------                               -----------         ------                     CBC with Differential[[id number]]       Abnormal            Final result                 Please view results for these tests on the individual orders.     EKG Readings: (Independently Interpreted)   Initial Reading: No STEMI. Previous EKG: Compared with most recent EKG Previous EKG Date: 09/09/2021. Rhythm: Sinus Bradycardia. Heart Rate: Fifty-eight. Ectopy: No Ectopy. Conduction: Normal. ST Segments: Normal ST Segments. T Waves: Normal. Clinical Impression: Sinus Bradycardia   Normal sinus bradycardia with a  heart rate of 58 and no acute ischemic changes       Imaging Results              X-Ray Chest PA And Lateral (Final result)  Result time 01/25/25 15:03:52      Final result by Selvin Santos MD (01/25/25 15:03:52)                   Impression:      No radiographic acute intrathoracic process seen.      Electronically signed by: Selvin Santos MD  Date:    01/25/2025  Time:    15:03               Narrative:    EXAMINATION:  XR CHEST PA AND LATERAL    CLINICAL HISTORY:  Essential (primary) hypertension    TECHNIQUE:  PA and lateral views of the chest were performed.    COMPARISON:  Chest radiograph 04/19/2022 and CT abdomen and pelvis 02/01/2023    FINDINGS:  Monitoring leads overlie the chest.  Medial lower anterior left chest wall suspected loop recorder device now in place.The lungs are symmetrically well expanded and clear, with normal appearance of pulmonary vasculature and no pleural effusion or pneumothorax.    The cardiac silhouette is normal in size. The hilar and mediastinal contours are unremarkable.    Bones are intact. Right upper quadrant surgical clips noted.                                       Medications   sodium chloride 0.9% bolus 500 mL 500 mL (0 mLs Intravenous Stopped 1/25/25 1610)   sodium polystyrene sulfonate 15 gram/60 mL 0.25 g/mL oral liquid 15 g (15 g Oral Given 1/25/25 1515)   sodium bicarbonate 8.4 % (1 mEq/mL) injection 50 mEq (50 mEq Intravenous Given 1/25/25 1523)   insulin regular injection 5 Units 0.05 mL (5 Units Intravenous Given 1/25/25 1521)     Medical Decision Making  Patient comes in with a history of elevated potassium after blood draw yesterday, rule out hyperkalemia, dehydration, renal failure, hemolysis.    Amount and/or Complexity of Data Reviewed  Labs: ordered. Decision-making details documented in ED Course.  Radiology: ordered. Decision-making details documented in ED Course.  Discussion of management or test interpretation with external provider(s): Patient with a  acute on chronic renal failure GFR down to 27, potassium is still elevated he had after treatment.  Did not give her calcium chloride because her calcium in his already elevated from primary hyperparathyroidism.  Patient also has fluctuating blood pressure is well.  Discussed with Dr. Shetty, hospitalist [facility] who agreed to accept for telemetry bed.  Also the patient has agreed for transfer.    Risk  OTC drugs.  Prescription drug management.               ED Course as of 01/28/25 1706   Sat Jan 25, 2025   1433 Basic metabolic panel(!) [PW]   1507 X-Ray Chest PA And Lateral [PW]   1542 CBC auto differential(!) [PW]   1542 ABG shows pH 7.4, pCO2 of 38, PO2 101 with O2 sat 90% on room air bicarb of 23 [PW]   1546 Troponin I High Sensitivity: <2.7 [PW]   1554 POC Glucose(!): 305 [PW]   1554 POCT ARTERIAL BLOOD GAS [PW]   1715 Potassium(!): 5.7 [PW]   1718 Patient did not get Calcium IV because of elevated Calcium from Hyperparathyroidsm [PW]   1849 SIGN OFF TO REPORT-DR VERA. [PW]   1849 PATIENT AWAITING OPEN BED AT [facility] [PW]      ED Course User Index  [PW] Gibson June MD                             Clinical Impression:   Final diagnoses:  [E87.5] Hyperkalemia  [I10] Hypertension  [N17.8, N18.4] Acute renal failure with other specified pathological kidney lesion superimposed on stage 4 chronic kidney disease (Primary)  [I16.0] Hypertensive urgency  [E83.52, E21.5] Parathyroid related hypercalcemia        ED Disposition Condition    Transfer to Another Facility Serious                Gibson June MD  01/28/25 1706

## 2025-01-26 ENCOUNTER — HOSPITAL ENCOUNTER (OUTPATIENT)
Facility: HOSPITAL | Age: 70
Discharge: HOME | End: 2025-01-27
Attending: HOSPITALIST | Admitting: FAMILY MEDICINE
Payer: MEDICARE

## 2025-01-26 VITALS
OXYGEN SATURATION: 97 % | SYSTOLIC BLOOD PRESSURE: 160 MMHG | HEART RATE: 70 BPM | RESPIRATION RATE: 16 BRPM | DIASTOLIC BLOOD PRESSURE: 72 MMHG | TEMPERATURE: 98 F | BODY MASS INDEX: 29.25 KG/M2 | HEIGHT: 66 IN | WEIGHT: 182 LBS

## 2025-01-26 DIAGNOSIS — R07.9 CHEST PAIN: ICD-10-CM

## 2025-01-26 DIAGNOSIS — N17.9 ACUTE RENAL FAILURE, UNSPECIFIED ACUTE RENAL FAILURE TYPE: Primary | ICD-10-CM

## 2025-01-26 DIAGNOSIS — E11.9 TYPE 2 DIABETES MELLITUS WITHOUT COMPLICATION, WITHOUT LONG-TERM CURRENT USE OF INSULIN: ICD-10-CM

## 2025-01-26 DIAGNOSIS — E87.5 HYPERKALEMIA: ICD-10-CM

## 2025-01-26 DIAGNOSIS — G72.0 STATIN MYOPATHY: ICD-10-CM

## 2025-01-26 DIAGNOSIS — I10 ESSENTIAL HYPERTENSION: ICD-10-CM

## 2025-01-26 DIAGNOSIS — T46.6X5A STATIN MYOPATHY: ICD-10-CM

## 2025-01-26 LAB
ANION GAP SERPL CALCULATED.3IONS-SCNC: 13 MMOL/L (ref 7–16)
BUN SERPL-MCNC: 34 MG/DL (ref 10–20)
BUN/CREAT SERPL: 23 (ref 6–20)
CALCIUM SERPL-MCNC: 10.3 MG/DL (ref 8.4–10.2)
CHLORIDE SERPL-SCNC: 114 MMOL/L (ref 98–107)
CK SERPL-CCNC: 42 U/L (ref 29–168)
CO2 SERPL-SCNC: 21 MMOL/L (ref 23–31)
CREAT SERPL-MCNC: 1.46 MG/DL (ref 0.55–1.02)
EGFR (NO RACE VARIABLE) (RUSH/TITUS): 39 ML/MIN/1.73M2
GLUCOSE SERPL-MCNC: 118 MG/DL (ref 70–105)
GLUCOSE SERPL-MCNC: 124 MG/DL (ref 70–105)
GLUCOSE SERPL-MCNC: 138 MG/DL (ref 82–115)
GLUCOSE SERPL-MCNC: 140 MG/DL (ref 70–105)
POTASSIUM SERPL-SCNC: 5.7 MMOL/L (ref 3.5–5.1)
SODIUM SERPL-SCNC: 142 MMOL/L (ref 136–145)

## 2025-01-26 PROCEDURE — 82962 GLUCOSE BLOOD TEST: CPT

## 2025-01-26 PROCEDURE — G0378 HOSPITAL OBSERVATION PER HR: HCPCS

## 2025-01-26 PROCEDURE — 94761 N-INVAS EAR/PLS OXIMETRY MLT: CPT

## 2025-01-26 PROCEDURE — 25000003 PHARM REV CODE 250: Performed by: FAMILY MEDICINE

## 2025-01-26 PROCEDURE — 11000001 HC ACUTE MED/SURG PRIVATE ROOM

## 2025-01-26 PROCEDURE — 36415 COLL VENOUS BLD VENIPUNCTURE: CPT | Performed by: FAMILY MEDICINE

## 2025-01-26 PROCEDURE — 99223 1ST HOSP IP/OBS HIGH 75: CPT | Mod: AI,,, | Performed by: FAMILY MEDICINE

## 2025-01-26 PROCEDURE — 94799 UNLISTED PULMONARY SVC/PX: CPT

## 2025-01-26 PROCEDURE — G0379 DIRECT REFER HOSPITAL OBSERV: HCPCS

## 2025-01-26 PROCEDURE — 82550 ASSAY OF CK (CPK): CPT | Performed by: FAMILY MEDICINE

## 2025-01-26 PROCEDURE — 80048 BASIC METABOLIC PNL TOTAL CA: CPT | Performed by: FAMILY MEDICINE

## 2025-01-26 PROCEDURE — 99900035 HC TECH TIME PER 15 MIN (STAT)

## 2025-01-26 PROCEDURE — 63600175 PHARM REV CODE 636 W HCPCS: Performed by: FAMILY MEDICINE

## 2025-01-26 RX ORDER — ALBUTEROL SULFATE 0.83 MG/ML
2.5 SOLUTION RESPIRATORY (INHALATION) EVERY 4 HOURS PRN
Status: DISCONTINUED | OUTPATIENT
Start: 2025-01-26 | End: 2025-01-27 | Stop reason: HOSPADM

## 2025-01-26 RX ORDER — CARVEDILOL 12.5 MG/1
12.5 TABLET ORAL 2 TIMES DAILY
Status: DISCONTINUED | OUTPATIENT
Start: 2025-01-26 | End: 2025-01-27 | Stop reason: HOSPADM

## 2025-01-26 RX ORDER — BISACODYL 5 MG
10 TABLET, DELAYED RELEASE (ENTERIC COATED) ORAL DAILY PRN
Status: DISCONTINUED | OUTPATIENT
Start: 2025-01-26 | End: 2025-01-27 | Stop reason: HOSPADM

## 2025-01-26 RX ORDER — INSULIN ASPART 100 [IU]/ML
0-5 INJECTION, SOLUTION INTRAVENOUS; SUBCUTANEOUS
Status: DISCONTINUED | OUTPATIENT
Start: 2025-01-26 | End: 2025-01-27 | Stop reason: HOSPADM

## 2025-01-26 RX ORDER — ASPIRIN 81 MG/1
81 TABLET ORAL ONCE
Status: COMPLETED | OUTPATIENT
Start: 2025-01-27 | End: 2025-01-26

## 2025-01-26 RX ORDER — GLUCAGON 1 MG
1 KIT INJECTION
Status: DISCONTINUED | OUTPATIENT
Start: 2025-01-26 | End: 2025-01-27 | Stop reason: HOSPADM

## 2025-01-26 RX ORDER — TALC
9 POWDER (GRAM) TOPICAL NIGHTLY PRN
Status: DISCONTINUED | OUTPATIENT
Start: 2025-01-26 | End: 2025-01-27 | Stop reason: HOSPADM

## 2025-01-26 RX ORDER — HYDRALAZINE HYDROCHLORIDE 20 MG/ML
10 INJECTION INTRAMUSCULAR; INTRAVENOUS EVERY 6 HOURS PRN
Status: DISCONTINUED | OUTPATIENT
Start: 2025-01-26 | End: 2025-01-27 | Stop reason: HOSPADM

## 2025-01-26 RX ORDER — GUAIFENESIN AND DEXTROMETHORPHAN HYDROBROMIDE 10; 100 MG/5ML; MG/5ML
10 SYRUP ORAL EVERY 4 HOURS PRN
Status: DISCONTINUED | OUTPATIENT
Start: 2025-01-26 | End: 2025-01-27 | Stop reason: HOSPADM

## 2025-01-26 RX ORDER — IBUPROFEN 200 MG
16 TABLET ORAL
Status: DISCONTINUED | OUTPATIENT
Start: 2025-01-26 | End: 2025-01-27 | Stop reason: HOSPADM

## 2025-01-26 RX ORDER — ATORVASTATIN CALCIUM 40 MG/1
40 TABLET, FILM COATED ORAL DAILY
Status: DISCONTINUED | OUTPATIENT
Start: 2025-01-27 | End: 2025-01-26

## 2025-01-26 RX ORDER — ALUMINUM HYDROXIDE, MAGNESIUM HYDROXIDE, AND SIMETHICONE 2400; 240; 2400 MG/30ML; MG/30ML; MG/30ML
30 SUSPENSION ORAL EVERY 6 HOURS PRN
Status: DISCONTINUED | OUTPATIENT
Start: 2025-01-26 | End: 2025-01-27 | Stop reason: HOSPADM

## 2025-01-26 RX ORDER — ACETAMINOPHEN 500 MG
1000 TABLET ORAL EVERY 6 HOURS PRN
Status: DISCONTINUED | OUTPATIENT
Start: 2025-01-26 | End: 2025-01-27 | Stop reason: HOSPADM

## 2025-01-26 RX ORDER — SODIUM CHLORIDE 0.9 % (FLUSH) 0.9 %
10 SYRINGE (ML) INJECTION EVERY 12 HOURS PRN
Status: DISCONTINUED | OUTPATIENT
Start: 2025-01-26 | End: 2025-01-27 | Stop reason: HOSPADM

## 2025-01-26 RX ORDER — ONDANSETRON HYDROCHLORIDE 2 MG/ML
4 INJECTION, SOLUTION INTRAVENOUS EVERY 6 HOURS PRN
Status: DISCONTINUED | OUTPATIENT
Start: 2025-01-26 | End: 2025-01-27 | Stop reason: HOSPADM

## 2025-01-26 RX ORDER — NALOXONE HCL 0.4 MG/ML
0.02 VIAL (ML) INJECTION
Status: DISCONTINUED | OUTPATIENT
Start: 2025-01-26 | End: 2025-01-27 | Stop reason: HOSPADM

## 2025-01-26 RX ORDER — IBUPROFEN 200 MG
24 TABLET ORAL
Status: DISCONTINUED | OUTPATIENT
Start: 2025-01-26 | End: 2025-01-27 | Stop reason: HOSPADM

## 2025-01-26 RX ADMIN — APIXABAN 5 MG: 5 TABLET, FILM COATED ORAL at 09:01

## 2025-01-26 RX ADMIN — ASPIRIN 81 MG: 81 TABLET, COATED ORAL at 10:01

## 2025-01-26 RX ADMIN — SODIUM CHLORIDE, POTASSIUM CHLORIDE, SODIUM LACTATE AND CALCIUM CHLORIDE 1000 ML: 600; 310; 30; 20 INJECTION, SOLUTION INTRAVENOUS at 06:01

## 2025-01-26 RX ADMIN — SODIUM ZIRCONIUM CYCLOSILICATE 10 G: 10 POWDER, FOR SUSPENSION ORAL at 06:01

## 2025-01-26 RX ADMIN — Medication 9 MG: at 09:01

## 2025-01-26 RX ADMIN — CARVEDILOL 12.5 MG: 12.5 TABLET, FILM COATED ORAL at 09:01

## 2025-01-26 NOTE — ED NOTES
Meal tray provided, pt hand off report to RN Danielle pt sitting up in bed visiting with her , vss, nad noted at end of shift

## 2025-01-26 NOTE — HPI
Jonna Cunha is a 69 y.o. female with history of HTN, DM, carotid artery stenosis, and hyperparathyroidism, received as a transfer from [facility] for management of hyperkalemia and acute kidney injury. Patient reports she was seen by per PCP a few days ago with complaints of bilateral leg pain and weakness. Routine labs from this visits revealed a potassium >6.0 and patient was referred to the ED for further evaluation. She denies fever, chills, diaphoresis, chest pain, palpitations, shortness of breath, GI complaints, or focal neurological changes. Patient reports starting new medications, including a statin and ARB, within the past few weeks.     Initial evaluation with the following: K+ 5.9 -- 5.7 following dose of Lokelma, BUN/Cr 41/1.97 -- 34/1.46 following IV. Labs otherwise unremarkable aside from mild hyperglycemia and mild anemia. CXR with no acute process. EKG with sinus bradycardia (HR 58), otherwise unremarkable. Vital signs reviewed: bradycardia as noted, moderate hypertension.     Patient is to be admitted for continued evaluation and management.

## 2025-01-26 NOTE — ED NOTES
Home oxygen applied. Pt escorted to vehicle via wheelchair. Lunch tray served with water at this time

## 2025-01-26 NOTE — ED NOTES
Dietary staff notified that breakfast tray is ordered, am labs collected by Rosmery   Pt spoke with RN this morning and was given the number for the financial office. Pt spoke with them and was told they dont handle the matter of more visits. Per pt she spoke with insurance and salvador or RN has to call insurance company and get more visits authorized. Pt has kodi

## 2025-01-26 NOTE — ASSESSMENT & PLAN NOTE
"Patient's FSGs are uncontrolled due to hyperglycemia on current medication regimen.  Last A1c reviewed-   Lab Results   Component Value Date    HGBA1C 9.7 (H) 12/10/2024     Most recent fingerstick glucose reviewed- No results for input(s): "POCTGLUCOSE" in the last 24 hours.  Current correctional scale  Low  Maintain anti-hyperglycemic dose as follows-   Antihyperglycemics (From admission, onward)      Start     Stop Route Frequency Ordered    01/26/25 1746  insulin aspart U-100 injection 0-5 Units         -- SubQ Before meals & nightly PRN 01/26/25 1647          Hold Oral hypoglycemics while patient is in the hospital.    "

## 2025-01-26 NOTE — H&P
[facility]  [facility] Medicine  History & Physical    Patient Name: Jonna Cunha  MRN: [id number]  Patient Class: IP- Inpatient  Admission Date: 1/26/2025  Attending Physician: Fabby Georges DO   Primary Care Provider: Dahiana Quintero MD         Patient information was obtained from patient, past medical records, and ER records.     Subjective:     Principal Problem:Hyperkalemia    Chief Complaint:   Chief Complaint   Patient presents with    Leg Pain        HPI:   Jonna Cunha is a 69 y.o. female with history of HTN, DM, carotid artery stenosis, and hyperparathyroidism, received as a transfer from [facility] for management of hyperkalemia and acute kidney injury. Patient reports she was seen by per PCP a few days ago with complaints of bilateral leg pain and weakness. Routine labs from this visits revealed a potassium >6.0 and patient was referred to the ED for further evaluation. She denies fever, chills, diaphoresis, chest pain, palpitations, shortness of breath, GI complaints, or focal neurological changes. Patient reports starting new medications, including a statin and ARB, within the past few weeks.     Initial evaluation with the following: K+ 5.9 -- 5.7 following dose of Lokelma, BUN/Cr 41/1.97 -- 34/1.46 following IV. Labs otherwise unremarkable aside from mild hyperglycemia and mild anemia. CXR with no acute process. EKG with sinus bradycardia (HR 58), otherwise unremarkable. Vital signs reviewed: bradycardia as noted, moderate hypertension.     Patient is to be admitted for continued evaluation and management.         Past Medical History:   Diagnosis Date    Bilateral kidney stones 02/01/2023    Degeneration of lumbosacral intervertebral disc 09/16/2022    Diabetes mellitus, type 2     Diverticulitis 02/01/2023    Essential hypertension 04/19/2022    Lumbosacral radiculopathy 09/16/2022    Osteoarthritis     Primary hyperparathyroidism 04/19/2022    Spinal  stenosis of lumbar region 2022    Thyroid disease        Past Surgical History:   Procedure Laterality Date     SECTION      CHOLECYSTECTOMY      KNEE ARTHROSCOPY W/ MENISCECTOMY Left 2021    Procedure: ARTHROSCOPY, KNEE, WITH MENISCECTOMY;  Surgeon: Manuel Cruz MD;  Location: [facility] OR;  Service: Orthopedics;  Laterality: Left;    REPAIR OF MENISCUS OF KNEE Left 2021    Procedure: REPAIR, MENISCUS, KNEE;  Surgeon: Manuel Cruz MD;  Location: [facility] OR;  Service: Orthopedics;  Laterality: Left;    SPINE SURGERY      TUBAL LIGATION         Review of patient's allergies indicates:   Allergen Reactions    Corticosteroids (glucocorticoids)        Current Facility-Administered Medications on File Prior to Encounter   Medication    [DISCONTINUED] dextrose 50% injection 12.5 g    [DISCONTINUED] dextrose 50% injection 25 g    [DISCONTINUED] glucagon (human recombinant) injection 1 mg    [DISCONTINUED] glucose chewable tablet 16 g    [DISCONTINUED] glucose chewable tablet 24 g    [DISCONTINUED] insulin aspart U-100 injection 0-5 Units     Current Outpatient Medications on File Prior to Encounter   Medication Sig    acetaminophen (TYLENOL) 325 MG tablet Take 650 mg by mouth every 4 (four) hours as needed for Pain.    aspirin (ECOTRIN) 81 MG EC tablet Take 81 mg by mouth once.    baclofen (LIORESAL) 20 MG tablet Take 20 mg by mouth 3 (three) times daily.    carvediloL (COREG) 12.5 MG tablet Take 12.5 mg by mouth 2 (two) times daily.    dulaglutide (TRULICITY) 0.75 mg/0.5 mL pen injector Inject 0.75 mg into the skin.    ELIQUIS 5 mg Tab Take 5 mg by mouth.    insulin aspart, niacinamide, (FIASP FLEXTOUCH U-100 INSULIN) 100 unit/mL (3 mL) InPn Inject 12 Units into the skin.    JARDIANCE 10 mg tablet Take 10 mg by mouth.    naproxen (NAPROSYN) 500 MG tablet Take 1 tablet (500 mg total) by mouth 2 (two) times daily with meals.    olmesartan (BENICAR) 40 MG tablet Take 40 mg by mouth once  daily.    rosuvastatin (CRESTOR) 20 MG tablet Take 20 mg by mouth.    RYBELSUS 7 mg tablet Take 7 mg by mouth every morning.    senna-docusate 8.6-50 mg (PERICOLACE) 8.6-50 mg per tablet Take 1 tablet by mouth 2 (two) times daily.    TOUJEO MAX U-300 SOLOSTAR 300 unit/mL (3 mL) insulin pen INJECT 60 UNITS SQ DAILY. INCREASE BY 2 UNITS EVERY 3 DAYS UNTIL FASTING SUGAR BETWEEN 100-140     Family History       Problem Relation (Age of Onset)    Cancer Mother, Father          Tobacco Use    Smoking status: Never    Smokeless tobacco: Never   Substance and Sexual Activity    Alcohol use: Not Currently    Drug use: Never     Comment: Prescribed by Fabby Sheffield    Sexual activity: Yes     Review of Systems   Constitutional:  Negative for chills, diaphoresis and fever.   Respiratory:  Negative for cough and shortness of breath.    Cardiovascular:  Negative for chest pain and palpitations.   Gastrointestinal:  Negative for abdominal pain, nausea and vomiting.   Genitourinary:  Negative for decreased urine volume and difficulty urinating.   Musculoskeletal:  Positive for arthralgias.   Neurological:  Negative for dizziness, syncope and weakness.   Psychiatric/Behavioral:  Negative for confusion.      Objective:     Vital Signs (Most Recent):  Temp: 98.3 °F (36.8 °C) (01/26/25 1702)  Pulse: 66 (01/26/25 1702)  Resp: 16 (01/26/25 1702)  BP: (!) 173/67 (01/26/25 1702)  SpO2: 99 % (01/26/25 1702) Vital Signs (24h Range):  Temp:  [98 °F (36.7 °C)-98.3 °F (36.8 °C)] 98.3 °F (36.8 °C)  Pulse:  [55-70] 66  Resp:  [13-25] 16  SpO2:  [97 %-100 %] 99 %  BP: (113-173)/(46-89) 173/67     Weight: 82.6 kg (182 lb)  Body mass index is 30.29 kg/m².     Physical Exam  Constitutional:       General: She is not in acute distress.     Appearance: Normal appearance. She is not ill-appearing.   HENT:      Head: Normocephalic and atraumatic.      Nose: Nose normal.   Eyes:      Conjunctiva/sclera: Conjunctivae normal.      Pupils: Pupils are equal,  round, and reactive to light.   Cardiovascular:      Rate and Rhythm: Normal rate and regular rhythm.   Pulmonary:      Effort: Pulmonary effort is normal. No respiratory distress.   Musculoskeletal:         General: Tenderness (mild, bilateral) present.      Cervical back: No rigidity.   Skin:     Coloration: Skin is not jaundiced or pale.      Findings: No rash.   Neurological:      General: No focal deficit present.      Mental Status: She is alert and oriented to person, place, and time.   Psychiatric:         Behavior: Behavior normal.         Thought Content: Thought content normal.              CRANIAL NERVES     CN III, IV, VI   Pupils are equal, round, and reactive to light.       Significant Labs: All pertinent labs within the past 24 hours have been reviewed.    Significant Imaging: I have reviewed all pertinent imaging results/findings within the past 24 hours.  Assessment/Plan:     * Hyperkalemia  Hyperkalemia is likely due to  YANE, medications (ARB) .The patients most recent potassium results are listed below.  Recent Labs     01/25/25  1409 01/25/25  1700 01/26/25  0600   K 5.9* 5.7* 5.7*     Plan  - Monitor for arrhythmias with EKG and/or continuous telemetry.   - Treat the hyperkalemia with Potassium Binders.   - Monitor potassium: Daily  - The patient's hyperkalemia is stable            Acute renal failure  YANE is likely due to  unclear etiology . Baseline creatinine is unknown. Most recent creatinine and eGFR are listed below.  Recent Labs     01/25/25  1409 01/26/25  0600   CREATININE 1.97* 1.46*   EGFRNORACEVR 27* 39*      Plan  - YANE is stable  - Avoid nephrotoxins and renally dose meds for GFR listed above  - Monitor urine output, serial BMP, and adjust therapy as needed      Statin myopathy  Suspected based on recent initiation of statin therapy, report of bilateral lower extremity pain and weakness. Patient started on high-dose statin therapy due to history of TIA and carotid artery  "stenosis. Will hold for now and resume at decreased dose.       Type 2 diabetes mellitus without complication, without long-term current use of insulin  Patient's FSGs are uncontrolled due to hyperglycemia on current medication regimen.  Last A1c reviewed-   Lab Results   Component Value Date    HGBA1C 9.7 (H) 12/10/2024     Most recent fingerstick glucose reviewed- No results for input(s): "POCTGLUCOSE" in the last 24 hours.  Current correctional scale  Low  Maintain anti-hyperglycemic dose as follows-   Antihyperglycemics (From admission, onward)      Start     Stop Route Frequency Ordered    01/26/25 1746  insulin aspart U-100 injection 0-5 Units         -- SubQ Before meals & nightly PRN 01/26/25 1647          Hold Oral hypoglycemics while patient is in the hospital.      Essential hypertension  Patient's blood pressure range in the last 24 hours was:     Vitals:    01/26/25 1500 01/26/25 1702   BP: (!) 154/53 (!) 173/67       The patient's inpatient anti-hypertensive regimen is listed below:  Current Antihypertensives  carvediloL tablet 12.5 mg, 2 times daily, Oral  hydrALAZINE injection 10 mg, Every 6 hours PRN, Intravenous    Plan  - BP is uncontrolled, will adjust as follows: hydralazine PRN, further changes as indicated  - Holding ARB due to hyperkalemia and YANE      VTE Risk Mitigation (From admission, onward)           Ordered     apixaban tablet 5 mg  2 times daily         01/26/25 162                                    Fabby Georges,   Department of Hospital Medicine  [facility]          "

## 2025-01-26 NOTE — ED NOTES
Verbal order per Dr Myrick for pt to take her own home meds while waiting on a bed assignment, pt took Carvedilol 12.5mg po, Buspirone 5 mg po, Rouvustatin 20 mg po, Eliquis 5 mg po, Baclofen 20 mg po and pt states she take Toujero 60 units but does not have that medication with her, will review CBG and request orders for blood glucose coverage

## 2025-01-26 NOTE — ASSESSMENT & PLAN NOTE
YANE is likely due to  unclear etiology . Baseline creatinine is unknown. Most recent creatinine and eGFR are listed below.  Recent Labs     01/25/25  1409 01/26/25  0600   CREATININE 1.97* 1.46*   EGFRNORACEVR 27* 39*      Plan  - YANE is stable  - Avoid nephrotoxins and renally dose meds for GFR listed above  - Monitor urine output, serial BMP, and adjust therapy as needed

## 2025-01-26 NOTE — SUBJECTIVE & OBJECTIVE
Past Medical History:   Diagnosis Date    Bilateral kidney stones 2023    Degeneration of lumbosacral intervertebral disc 2022    Diabetes mellitus, type 2     Diverticulitis 2023    Essential hypertension 2022    Lumbosacral radiculopathy 2022    Osteoarthritis     Primary hyperparathyroidism 2022    Spinal stenosis of lumbar region 2022    Thyroid disease        Past Surgical History:   Procedure Laterality Date     SECTION      CHOLECYSTECTOMY      KNEE ARTHROSCOPY W/ MENISCECTOMY Left 2021    Procedure: ARTHROSCOPY, KNEE, WITH MENISCECTOMY;  Surgeon: Manuel Cruz MD;  Location: [facility];  Service: Orthopedics;  Laterality: Left;    REPAIR OF MENISCUS OF KNEE Left 2021    Procedure: REPAIR, MENISCUS, KNEE;  Surgeon: Manuel Cruz MD;  Location: [facility];  Service: Orthopedics;  Laterality: Left;    SPINE SURGERY      TUBAL LIGATION         Review of patient's allergies indicates:   Allergen Reactions    Corticosteroids (glucocorticoids)        Current Facility-Administered Medications on File Prior to Encounter   Medication    [DISCONTINUED] dextrose 50% injection 12.5 g    [DISCONTINUED] dextrose 50% injection 25 g    [DISCONTINUED] glucagon (human recombinant) injection 1 mg    [DISCONTINUED] glucose chewable tablet 16 g    [DISCONTINUED] glucose chewable tablet 24 g    [DISCONTINUED] insulin aspart U-100 injection 0-5 Units     Current Outpatient Medications on File Prior to Encounter   Medication Sig    acetaminophen (TYLENOL) 325 MG tablet Take 650 mg by mouth every 4 (four) hours as needed for Pain.    aspirin (ECOTRIN) 81 MG EC tablet Take 81 mg by mouth once.    baclofen (LIORESAL) 20 MG tablet Take 20 mg by mouth 3 (three) times daily.    carvediloL (COREG) 12.5 MG tablet Take 12.5 mg by mouth 2 (two) times daily.    dulaglutide (TRULICITY) 0.75 mg/0.5 mL pen injector Inject 0.75 mg into the skin.    ELIQUIS 5 mg Tab Take 5 mg  by mouth.    insulin aspart, niacinamide, (FIASP FLEXTOUCH U-100 INSULIN) 100 unit/mL (3 mL) InPn Inject 12 Units into the skin.    JARDIANCE 10 mg tablet Take 10 mg by mouth.    naproxen (NAPROSYN) 500 MG tablet Take 1 tablet (500 mg total) by mouth 2 (two) times daily with meals.    olmesartan (BENICAR) 40 MG tablet Take 40 mg by mouth once daily.    rosuvastatin (CRESTOR) 20 MG tablet Take 20 mg by mouth.    RYBELSUS 7 mg tablet Take 7 mg by mouth every morning.    senna-docusate 8.6-50 mg (PERICOLACE) 8.6-50 mg per tablet Take 1 tablet by mouth 2 (two) times daily.    TOUJEO MAX U-300 SOLOSTAR 300 unit/mL (3 mL) insulin pen INJECT 60 UNITS SQ DAILY. INCREASE BY 2 UNITS EVERY 3 DAYS UNTIL FASTING SUGAR BETWEEN 100-140     Family History       Problem Relation (Age of Onset)    Cancer Mother, Father          Tobacco Use    Smoking status: Never    Smokeless tobacco: Never   Substance and Sexual Activity    Alcohol use: Not Currently    Drug use: Never     Comment: Prescribed by Fabby Sheffield    Sexual activity: Yes     Review of Systems   Constitutional:  Negative for chills, diaphoresis and fever.   Respiratory:  Negative for cough and shortness of breath.    Cardiovascular:  Negative for chest pain and palpitations.   Gastrointestinal:  Negative for abdominal pain, nausea and vomiting.   Genitourinary:  Negative for decreased urine volume and difficulty urinating.   Musculoskeletal:  Positive for arthralgias.   Neurological:  Negative for dizziness, syncope and weakness.   Psychiatric/Behavioral:  Negative for confusion.      Objective:     Vital Signs (Most Recent):  Temp: 98.3 °F (36.8 °C) (01/26/25 1702)  Pulse: 66 (01/26/25 1702)  Resp: 16 (01/26/25 1702)  BP: (!) 173/67 (01/26/25 1702)  SpO2: 99 % (01/26/25 1702) Vital Signs (24h Range):  Temp:  [98 °F (36.7 °C)-98.3 °F (36.8 °C)] 98.3 °F (36.8 °C)  Pulse:  [55-70] 66  Resp:  [13-25] 16  SpO2:  [97 %-100 %] 99 %  BP: (113-173)/(46-89) 173/67     Weight:  82.6 kg (182 lb)  Body mass index is 30.29 kg/m².     Physical Exam  Constitutional:       General: She is not in acute distress.     Appearance: Normal appearance. She is not ill-appearing.   HENT:      Head: Normocephalic and atraumatic.      Nose: Nose normal.   Eyes:      Conjunctiva/sclera: Conjunctivae normal.      Pupils: Pupils are equal, round, and reactive to light.   Cardiovascular:      Rate and Rhythm: Normal rate and regular rhythm.   Pulmonary:      Effort: Pulmonary effort is normal. No respiratory distress.   Musculoskeletal:         General: Tenderness (mild, bilateral) present.      Cervical back: No rigidity.   Skin:     Coloration: Skin is not jaundiced or pale.      Findings: No rash.   Neurological:      General: No focal deficit present.      Mental Status: She is alert and oriented to person, place, and time.   Psychiatric:         Behavior: Behavior normal.         Thought Content: Thought content normal.              CRANIAL NERVES     CN III, IV, VI   Pupils are equal, round, and reactive to light.       Significant Labs: All pertinent labs within the past 24 hours have been reviewed.    Significant Imaging: I have reviewed all pertinent imaging results/findings within the past 24 hours.

## 2025-01-26 NOTE — ASSESSMENT & PLAN NOTE
Patient's blood pressure range in the last 24 hours was:     Vitals:    01/26/25 1500 01/26/25 1702   BP: (!) 154/53 (!) 173/67       The patient's inpatient anti-hypertensive regimen is listed below:  Current Antihypertensives  carvediloL tablet 12.5 mg, 2 times daily, Oral  hydrALAZINE injection 10 mg, Every 6 hours PRN, Intravenous    Plan  - BP is uncontrolled, will adjust as follows: hydralazine PRN, further changes as indicated  - Holding ARB due to hyperkalemia and YANE

## 2025-01-26 NOTE — ED NOTES
PT AMBULATORY TO BATHROOM AND BACK TO BED SAFELY/DENIES ANY FURTHER NEEDS AT THIS TIME/ AT BEDSIDE

## 2025-01-26 NOTE — NURSING
1349: Received report over the phone from CORRINE Santos at [place] [facility].    1455: Patient to room 449 at this time. Patient oriented to room. Call bell given. Safety measures in place. NAD noted. No needs expressed. VSS.

## 2025-01-26 NOTE — ASSESSMENT & PLAN NOTE
Suspected based on recent initiation of statin therapy, report of bilateral lower extremity pain and weakness. Patient started on high-dose statin therapy due to history of TIA and carotid artery stenosis. Will hold for now and resume at decreased dose.

## 2025-01-26 NOTE — ASSESSMENT & PLAN NOTE
Hyperkalemia is likely due to  YANE, medications (ARB) .The patients most recent potassium results are listed below.  Recent Labs     01/25/25  1409 01/25/25  1700 01/26/25  0600   K 5.9* 5.7* 5.7*     Plan  - Monitor for arrhythmias with EKG and/or continuous telemetry.   - Treat the hyperkalemia with Potassium Binders.   - Monitor potassium: Daily  - The patient's hyperkalemia is stable

## 2025-01-27 VITALS
HEIGHT: 65 IN | DIASTOLIC BLOOD PRESSURE: 71 MMHG | BODY MASS INDEX: 30.32 KG/M2 | TEMPERATURE: 98 F | OXYGEN SATURATION: 96 % | RESPIRATION RATE: 16 BRPM | WEIGHT: 182 LBS | HEART RATE: 63 BPM | SYSTOLIC BLOOD PRESSURE: 169 MMHG

## 2025-01-27 PROBLEM — N17.9 ACUTE RENAL FAILURE: Status: RESOLVED | Noted: 2025-01-26 | Resolved: 2025-01-27

## 2025-01-27 PROBLEM — E87.5 HYPERKALEMIA: Status: RESOLVED | Noted: 2025-01-26 | Resolved: 2025-01-27

## 2025-01-27 PROBLEM — T46.6X5A STATIN MYOPATHY: Status: RESOLVED | Noted: 2025-01-26 | Resolved: 2025-01-27

## 2025-01-27 PROBLEM — G72.0 STATIN MYOPATHY: Status: RESOLVED | Noted: 2025-01-26 | Resolved: 2025-01-27

## 2025-01-27 LAB
ANION GAP SERPL CALCULATED.3IONS-SCNC: 13 MMOL/L (ref 7–16)
BASOPHILS # BLD AUTO: 0.02 K/UL (ref 0–0.2)
BASOPHILS NFR BLD AUTO: 0.4 % (ref 0–1)
BUN SERPL-MCNC: 28 MG/DL (ref 10–20)
BUN/CREAT SERPL: 23 (ref 6–20)
CALCIUM SERPL-MCNC: 10.6 MG/DL (ref 8.4–10.2)
CHLORIDE SERPL-SCNC: 112 MMOL/L (ref 98–107)
CO2 SERPL-SCNC: 21 MMOL/L (ref 23–31)
CREAT SERPL-MCNC: 1.21 MG/DL (ref 0.55–1.02)
DIFFERENTIAL METHOD BLD: ABNORMAL
EGFR (NO RACE VARIABLE) (RUSH/TITUS): 49 ML/MIN/1.73M2
EOSINOPHIL # BLD AUTO: 0.1 K/UL (ref 0–0.5)
EOSINOPHIL NFR BLD AUTO: 2.1 % (ref 1–4)
ERYTHROCYTE [DISTWIDTH] IN BLOOD BY AUTOMATED COUNT: 12.3 % (ref 11.5–14.5)
GLUCOSE SERPL-MCNC: 144 MG/DL (ref 70–105)
GLUCOSE SERPL-MCNC: 148 MG/DL (ref 82–115)
GLUCOSE SERPL-MCNC: 176 MG/DL (ref 70–105)
HCT VFR BLD AUTO: 27.1 % (ref 38–47)
HGB BLD-MCNC: 8.7 G/DL (ref 12–16)
IMM GRANULOCYTES # BLD AUTO: 0.01 K/UL (ref 0–0.04)
IMM GRANULOCYTES NFR BLD: 0.2 % (ref 0–0.4)
LYMPHOCYTES # BLD AUTO: 2.32 K/UL (ref 1–4.8)
LYMPHOCYTES NFR BLD AUTO: 48.1 % (ref 27–41)
MCH RBC QN AUTO: 29 PG (ref 27–31)
MCHC RBC AUTO-ENTMCNC: 32.1 G/DL (ref 32–36)
MCV RBC AUTO: 90.3 FL (ref 80–96)
MONOCYTES # BLD AUTO: 0.27 K/UL (ref 0–0.8)
MONOCYTES NFR BLD AUTO: 5.6 % (ref 2–6)
MPC BLD CALC-MCNC: 10.5 FL (ref 9.4–12.4)
NEUTROPHILS # BLD AUTO: 2.1 K/UL (ref 1.8–7.7)
NEUTROPHILS NFR BLD AUTO: 43.6 % (ref 53–65)
NRBC # BLD AUTO: 0 X10E3/UL
NRBC, AUTO (.00): 0 %
PLATELET # BLD AUTO: 162 K/UL (ref 150–400)
POTASSIUM SERPL-SCNC: 4.9 MMOL/L (ref 3.5–5.1)
RBC # BLD AUTO: 3 M/UL (ref 4.2–5.4)
SODIUM SERPL-SCNC: 141 MMOL/L (ref 136–145)
WBC # BLD AUTO: 4.82 K/UL (ref 4.5–11)

## 2025-01-27 PROCEDURE — 99900035 HC TECH TIME PER 15 MIN (STAT)

## 2025-01-27 PROCEDURE — 25000003 PHARM REV CODE 250: Performed by: FAMILY MEDICINE

## 2025-01-27 PROCEDURE — 99239 HOSP IP/OBS DSCHRG MGMT >30: CPT | Mod: ,,, | Performed by: FAMILY MEDICINE

## 2025-01-27 PROCEDURE — 80048 BASIC METABOLIC PNL TOTAL CA: CPT | Performed by: FAMILY MEDICINE

## 2025-01-27 PROCEDURE — 85025 COMPLETE CBC W/AUTO DIFF WBC: CPT | Performed by: FAMILY MEDICINE

## 2025-01-27 PROCEDURE — G0378 HOSPITAL OBSERVATION PER HR: HCPCS

## 2025-01-27 PROCEDURE — 82962 GLUCOSE BLOOD TEST: CPT

## 2025-01-27 PROCEDURE — 36415 COLL VENOUS BLD VENIPUNCTURE: CPT | Performed by: FAMILY MEDICINE

## 2025-01-27 RX ORDER — AMLODIPINE BESYLATE 5 MG/1
5 TABLET ORAL DAILY
Status: DISCONTINUED | OUTPATIENT
Start: 2025-01-27 | End: 2025-01-27 | Stop reason: HOSPADM

## 2025-01-27 RX ORDER — AMLODIPINE BESYLATE 5 MG/1
5 TABLET ORAL DAILY
Qty: 30 TABLET | Refills: 1 | Status: SHIPPED | OUTPATIENT
Start: 2025-01-27 | End: 2026-01-27

## 2025-01-27 RX ORDER — PRAVASTATIN SODIUM 40 MG/1
40 TABLET ORAL DAILY
Qty: 30 TABLET | Refills: 1 | Status: SHIPPED | OUTPATIENT
Start: 2025-01-27 | End: 2026-01-27

## 2025-01-27 RX ADMIN — APIXABAN 5 MG: 5 TABLET, FILM COATED ORAL at 07:01

## 2025-01-27 RX ADMIN — AMLODIPINE BESYLATE 5 MG: 5 TABLET ORAL at 01:01

## 2025-01-27 RX ADMIN — CARVEDILOL 12.5 MG: 12.5 TABLET, FILM COATED ORAL at 07:01

## 2025-01-27 RX ADMIN — ACETAMINOPHEN 1000 MG: 500 TABLET ORAL at 03:01

## 2025-01-27 NOTE — NURSING
Patient needs telemetry monitor, but no monitors available according to monitor room. Will pass on this information to night shift.

## 2025-01-27 NOTE — PROGRESS NOTES
[facility]  [facility] Care    Patient Name:  Jonna Cunha   MRN:  [id number]  Date: 1/27/2025  Diagnosis: Hyperkalemia    History:     Past Medical History:   Diagnosis Date    Bilateral kidney stones 02/01/2023    Degeneration of lumbosacral intervertebral disc 09/16/2022    Diabetes mellitus, type 2     Diverticulitis 02/01/2023    Essential hypertension 04/19/2022    Lumbosacral radiculopathy 09/16/2022    Osteoarthritis     Primary hyperparathyroidism 04/19/2022    Spinal stenosis of lumbar region 09/16/2022    Thyroid disease        Social History     Socioeconomic History    Marital status:    Tobacco Use    Smoking status: Never    Smokeless tobacco: Never   Substance and Sexual Activity    Alcohol use: Not Currently    Drug use: Never     Comment: Prescribed by Fabby Sheffield    Sexual activity: Yes   Social History Narrative    ** Merged History Encounter **          Social Drivers of Health     Financial Resource Strain: Patient Declined (1/26/2025)    Overall Financial Resource Strain (CARDIA)     Difficulty of Paying Living Expenses: Patient declined   Food Insecurity: Patient Declined (1/26/2025)    Hunger Vital Sign     Worried About Running Out of Food in the Last Year: Patient declined     Ran Out of Food in the Last Year: Patient declined   Transportation Needs: Patient Declined (1/26/2025)    TRANSPORTATION NEEDS     Transportation : Patient declined   Stress: Patient Declined (1/26/2025)    [place] Levels of Occupational Health - Occupational Stress Questionnaire     Feeling of Stress : Patient declined   Housing Stability: Patient Declined (1/26/2025)    Housing Stability Vital Sign     Unable to Pay for Housing in the Last Year: Patient declined     Homeless in the Last Year: Patient declined       Precautions:     Allergies as of 01/25/2025 - Reviewed 01/25/2025   Allergen Reaction Noted    Corticosteroids (glucocorticoids)  02/01/2022       WOC Assessment  Details/Treatment     Narrative: Seen patient for initiation of preventative skin care measures    Patient in bed, Alert. States skin is ok. Has area to bilateral lower legs from scratching. States uses neosporin at home. Areas are dry. Heels ok.   Cj score 23    Consult wound care for any skin issues         01/27/2025

## 2025-01-27 NOTE — DISCHARGE INSTRUCTIONS
HOLD your olmesartan until instructed to resume by your PCP.     START taking amlodipine. Prescription is for 5 mg once daily - recommend taking 10 mg once daily while holding olmesartan, until you follow up with your PCP on Thursday. She can then decide which dose is right for you, based on your blood pressure and your other medications.     Hospitalist Discharge orders  *Notify your healthcare provider if you experience any of the following: temperature >100.4  *Notify your healthcare provider if you experience any of the following: redness, tenderness, or any signs or symptoms of infection (pain, swelling, redness, odor or green/yellow drainage around incision site).  *Notify your healthcare provider if you experience any of the following: difficulty breathing or increased cough.  *Notify your physician if you experience any persistent nausea, vomiting, diarrhea or headache.  *Notify your physician if you experience any of the following: severe uncontrolled pain, worsening rash, increased confusion, weakness, dizziness, lightheadedness, or visual disturbances.

## 2025-01-28 ENCOUNTER — PATIENT OUTREACH (OUTPATIENT)
Dept: ADMINISTRATIVE | Facility: CLINIC | Age: 70
End: 2025-01-28

## 2025-01-28 ENCOUNTER — TELEPHONE (OUTPATIENT)
Dept: VASCULAR SURGERY | Facility: CLINIC | Age: 70
End: 2025-01-28
Payer: MEDICARE

## 2025-01-28 DIAGNOSIS — I65.23 BILATERAL CAROTID ARTERY STENOSIS: Primary | ICD-10-CM

## 2025-01-28 NOTE — TELEPHONE ENCOUNTER
Vascular surgery    Cardiologist Dr. Mccoy at [facility] deemed patient intermediate risk for right carotid endarterectomy scheduled for February 26 at [facility] with Dr. Lainez    Pre admit appointment at [facility] is scheduled for February 19th at 9:00 a.m. attempted to call patient with pre admit appointment no answer we will call again

## 2025-01-28 NOTE — PROGRESS NOTES
C3 nurse spoke with Jonna Cunha  for a TCC post hospital discharge follow up call. The patient has a scheduled HOSFU appointment with GABRIELLE Regalado on 1/30/25. Unable to do med review d/t pt states she is not home at time of call and unable to do review. Reports has picked up new rxs.         
Yes

## 2025-01-29 ENCOUNTER — TELEPHONE (OUTPATIENT)
Dept: VASCULAR SURGERY | Facility: CLINIC | Age: 70
End: 2025-01-29
Payer: MEDICARE

## 2025-01-29 NOTE — TELEPHONE ENCOUNTER
----- Message from Abel sent at 1/29/2025  9:11 AM CST -----  Who Called: Jonna Cunha    Preferred Method of Contact: Phone Call  Patient's Preferred Phone Number on File: [phone]   Best Call Back Number, if different:  Additional Information: pt asked where would she go to [facility] and wanted to let you know she recently just got out of the hospital

## 2025-01-30 NOTE — ASSESSMENT & PLAN NOTE
Suspected based on recent initiation of statin therapy, report of bilateral lower extremity pain and weakness (since resolved). Patient started on high-dose statin therapy due to history of TIA and carotid artery stenosis. Statin held on admission.    Upon discharge, prescribed pravastatin due to favorable tolerance and side effect profile. Escalate therapy as tolerated.     Continue medications per discharge medication reconciliation. Follow up with primary care.

## 2025-01-30 NOTE — ASSESSMENT & PLAN NOTE
Resolved. Hyperkalemia likely due to  YANE, medications (ARB) . Renal function baseline at time of discharge, recommend holding ARB until follow up with primary care provided.

## 2025-01-30 NOTE — ASSESSMENT & PLAN NOTE
YANE is likely due to  unclear etiology . Resolved. Follow up with primary care for repeat labs within a week.

## 2025-01-30 NOTE — ASSESSMENT & PLAN NOTE
Patient's blood pressure range was controlled. Continue medications per discharge medication reconciliation. Follow up with primary care.

## 2025-01-30 NOTE — ASSESSMENT & PLAN NOTE
Patient's FSGs are uncontrolled due to hyperglycemia on current medication regimen.  Last A1c reviewed-   Lab Results   Component Value Date    HGBA1C 9.7 (H) 12/10/2024     Continue medications per discharge medication reconciliation. Follow up with endocrinology (Dr. Covington) and primary care.

## 2025-01-30 NOTE — DISCHARGE SUMMARY
[facility] Hospital Medicine  Discharge Summary      Patient Name: Jonna Cunha  MRN: [id number]  SATISH: [id number]  Patient Class: OP- Observation  Admission Date: 1/26/2025  Hospital Length of Stay: 1 days  Discharge Date and Time: 1/27/2025  2:55 PM  Attending Physician: No att. providers found   Discharging Provider: Fabby Georges DO  Primary Care Provider: Dahiana Quintero MD    Primary Care Team: Networked reference to record PCT     HPI:     Jonna Cunha is a 69 y.o. female with history of HTN, DM, carotid artery stenosis, and hyperparathyroidism, received as a transfer from [facility] for management of hyperkalemia and acute kidney injury. Patient reports she was seen by per PCP a few days ago with complaints of bilateral leg pain and weakness. Routine labs from this visits revealed a potassium >6.0 and patient was referred to the ED for further evaluation. She denies fever, chills, diaphoresis, chest pain, palpitations, shortness of breath, GI complaints, or focal neurological changes. Patient reports starting new medications, including a statin and ARB, within the past few weeks.     Initial evaluation with the following: K+ 5.9 -- 5.7 following dose of Lokelma, BUN/Cr 41/1.97 -- 34/1.46 following IV. Labs otherwise unremarkable aside from mild hyperglycemia and mild anemia. CXR with no acute process. EKG with sinus bradycardia (HR 58), otherwise unremarkable. Vital signs reviewed: bradycardia as noted, moderate hypertension.     Patient is to be admitted for continued evaluation and management.         * No surgery found *      Hospital Course:   No notes on file     Goals of Care Treatment Preferences:  Code Status: Full Code      SDOH Screening:  The patient declined to be screened for utility difficulties, food insecurity, transport difficulties, housing insecurity, and interpersonal safety, so no concerns could be identified this admission.     Consults:     Type  2 diabetes mellitus without complication, without long-term current use of insulin  Patient's FSGs are uncontrolled due to hyperglycemia on current medication regimen.  Last A1c reviewed-   Lab Results   Component Value Date    HGBA1C 9.7 (H) 12/10/2024     Continue medications per discharge medication reconciliation. Follow up with endocrinology (Dr. Covington) and primary care.      Essential hypertension  Patient's blood pressure range was controlled. Continue medications per discharge medication reconciliation. Follow up with primary care.        Final Active Diagnoses:    Diagnosis Date Noted POA    Type 2 diabetes mellitus without complication, without long-term current use of insulin [E11.9] 01/26/2025 Yes    Essential hypertension [I10] 04/19/2022 Yes      Problems Resolved During this Admission:    Diagnosis Date Noted Date Resolved POA    PRINCIPAL PROBLEM:  Hyperkalemia [E87.5] 01/26/2025 01/27/2025 Yes    Acute renal failure [N17.9] 01/26/2025 01/27/2025 Yes    Statin myopathy [G72.0, T46.6X5A] 01/26/2025 01/27/2025 Yes       Discharged Condition: stable    Disposition: Home or Self Care    Follow Up:   Follow-up Information       GABRIELLE Regalado ([place]) Follow up on 1/30/2025.    Why: Keep your scheduled appointment on Thursday 01/30/2025, or follow up sooner if needed.  Contact information:  [address] [place] [address]    Phone: [phone]                         Patient Instructions:      Notify your health care provider if you experience any of the following:  temperature >100.4     Notify your health care provider if you experience any of the following:  persistent nausea and vomiting or diarrhea     Notify your health care provider if you experience any of the following:  severe uncontrolled pain     Notify your health care provider if you experience any of the following:  redness, tenderness, or signs of infection (pain, swelling, redness, odor or green/yellow discharge around  incision site)     Notify your health care provider if you experience any of the following:  difficulty breathing or increased cough     Notify your health care provider if you experience any of the following:  severe persistent headache     Notify your health care provider if you experience any of the following:  worsening rash     Notify your health care provider if you experience any of the following:  persistent dizziness, light-headedness, or visual disturbances     Notify your health care provider if you experience any of the following:  increased confusion or weakness     Activity as tolerated       Significant Diagnostic Studies: N/A    Pending Diagnostic Studies:       None           Medications:  Reconciled Home Medications:      Medication List        START taking these medications      amLODIPine 5 MG tablet  Commonly known as: NORVASC  Take 1 tablet (5 mg total) by mouth once daily.  Notes to patient: Take TWO tablets (10 mg) daily while holding olmesartan, until your follow up visit on Thursday 1/30. Your PCP can then decide an appropriate dose.      pravastatin 40 MG tablet  Commonly known as: PRAVACHOL  Take 1 tablet (40 mg total) by mouth once daily. for cholesterol            CONTINUE taking these medications      acetaminophen 325 MG tablet  Commonly known as: TYLENOL  Take 650 mg by mouth every 4 (four) hours as needed for Pain.     aspirin 81 MG EC tablet  Commonly known as: ECOTRIN  Take 81 mg by mouth once.     baclofen 20 MG tablet  Commonly known as: LIORESAL  Take 20 mg by mouth 3 (three) times daily.     carvediloL 12.5 MG tablet  Commonly known as: COREG  Take 12.5 mg by mouth 2 (two) times daily.     dulaglutide 0.75 mg/0.5 mL pen injector  Commonly known as: TRULICITY  Inject 0.75 mg into the skin.     ELIQUIS 5 mg Tab  Generic drug: apixaban  Take 5 mg by mouth.     FIASP FLEXTOUCH U-100 INSULIN 100 unit/mL (3 mL) Inpn  Generic drug: insulin aspart (niacinamide)  Inject 12 Units into  the skin.     JARDIANCE 10 mg tablet  Generic drug: empagliflozin  Take 10 mg by mouth.     naproxen 500 MG tablet  Commonly known as: NAPROSYN  Take 1 tablet (500 mg total) by mouth 2 (two) times daily with meals.     olmesartan 40 MG tablet  Commonly known as: BENICAR  Take 40 mg by mouth once daily.  Notes to patient: HOLD this medication until you follow up with your primary care provider.     senna-docusate 8.6-50 mg 8.6-50 mg per tablet  Commonly known as: PERICOLACE  Take 1 tablet by mouth 2 (two) times daily.     TOUJEO MAX U-300 SOLOSTAR 300 unit/mL (3 mL) insulin pen  Generic drug: insulin glargine U-300 conc  INJECT 60 UNITS SQ DAILY. INCREASE BY 2 UNITS EVERY 3 DAYS UNTIL FASTING SUGAR BETWEEN 100-140            STOP taking these medications      rosuvastatin 20 MG tablet  Commonly known as: CRESTOR              Indwelling Lines/Drains at time of discharge:   Lines/Drains/Airways       None                   Time spent on the discharge of patient: 40 minutes         Fabby Georges DO  Department of Hospital Medicine  [facility]

## 2025-01-31 LAB
OHS QRS DURATION: 98 MS
OHS QTC CALCULATION: 386 MS

## 2025-02-26 ENCOUNTER — OUTSIDE PLACE OF SERVICE (OUTPATIENT)
Dept: VASCULAR SURGERY | Facility: CLINIC | Age: 70
End: 2025-02-26
Payer: MEDICARE

## 2025-02-27 DIAGNOSIS — G89.18 PAIN AT SURGICAL SITE: Primary | ICD-10-CM

## 2025-02-27 RX ORDER — HYDROCODONE BITARTRATE AND ACETAMINOPHEN 7.5; 325 MG/1; MG/1
1 TABLET ORAL EVERY 6 HOURS PRN
Qty: 15 TABLET | Refills: 0 | Status: SHIPPED | OUTPATIENT
Start: 2025-02-27

## 2025-03-10 ENCOUNTER — OFFICE VISIT (OUTPATIENT)
Dept: VASCULAR SURGERY | Facility: CLINIC | Age: 70
End: 2025-03-10
Payer: MEDICARE

## 2025-03-10 VITALS — BODY MASS INDEX: 30.34 KG/M2 | WEIGHT: 182.13 LBS | HEIGHT: 65 IN

## 2025-03-10 DIAGNOSIS — I65.23 BILATERAL CAROTID ARTERY STENOSIS: Primary | ICD-10-CM

## 2025-03-10 PROCEDURE — 99214 OFFICE O/P EST MOD 30 MIN: CPT | Mod: PBBFAC | Performed by: NURSE PRACTITIONER

## 2025-03-10 PROCEDURE — 1159F MED LIST DOCD IN RCRD: CPT | Mod: CPTII,,, | Performed by: NURSE PRACTITIONER

## 2025-03-10 PROCEDURE — 1101F PT FALLS ASSESS-DOCD LE1/YR: CPT | Mod: CPTII,,, | Performed by: NURSE PRACTITIONER

## 2025-03-10 PROCEDURE — 99999 PR PBB SHADOW E&M-EST. PATIENT-LVL IV: CPT | Mod: PBBFAC,,, | Performed by: NURSE PRACTITIONER

## 2025-03-10 PROCEDURE — 1125F AMNT PAIN NOTED PAIN PRSNT: CPT | Mod: CPTII,,, | Performed by: NURSE PRACTITIONER

## 2025-03-10 PROCEDURE — 99024 POSTOP FOLLOW-UP VISIT: CPT | Mod: ,,, | Performed by: NURSE PRACTITIONER

## 2025-03-10 PROCEDURE — 3288F FALL RISK ASSESSMENT DOCD: CPT | Mod: CPTII,,, | Performed by: NURSE PRACTITIONER

## 2025-03-10 PROCEDURE — 1160F RVW MEDS BY RX/DR IN RCRD: CPT | Mod: CPTII,,, | Performed by: NURSE PRACTITIONER

## 2025-03-10 NOTE — PROGRESS NOTES
"Subjective:       Patient ID: Jonna Cunha is a 69 y.o. female.    Chief Complaint: Post-op Evaluation  Postop right carotid endarterectomy from [facility] she is doing well incision right oblique neck healing well suture from RAHEL drain site removed here in clinic no signs of infection no bleeding dressing applied  Neurologically intact her cardiologist is Dr. Mccoy  family history includes Cancer in her father and mother.  Past Medical History:   Diagnosis Date    Bilateral kidney stones 2023    Degeneration of lumbosacral intervertebral disc 2022    Diabetes mellitus, type 2     Diverticulitis 2023    Essential hypertension 2022    Lumbosacral radiculopathy 2022    Osteoarthritis     Primary hyperparathyroidism 2022    Spinal stenosis of lumbar region 2022    Thyroid disease       Past Surgical History:   Procedure Laterality Date     SECTION      CHOLECYSTECTOMY      KNEE ARTHROSCOPY W/ MENISCECTOMY Left 2021    Procedure: ARTHROSCOPY, KNEE, WITH MENISCECTOMY;  Surgeon: Manuel Cruz MD;  Location: [facility];  Service: Orthopedics;  Laterality: Left;    REPAIR OF MENISCUS OF KNEE Left 2021    Procedure: REPAIR, MENISCUS, KNEE;  Surgeon: Manuel Cruz MD;  Location: [facility] OR;  Service: Orthopedics;  Laterality: Left;    SPINE SURGERY      TUBAL LIGATION         reports that she has never smoked. She has never used smokeless tobacco. She reports that she does not currently use alcohol. She reports that she does not use drugs.   HPI  Review of Systems      Objective:      Ht 5' 5" (1.651 m)   Wt 82.6 kg (182 lb 1.6 oz)   BMI 30.30 kg/m²    Physical Exam      Assessment:       No diagnosis found.    Plan:       Continue antiplatelets resume diabetic medicine okay to drive suture removed from RAHEL drain site follow-up in 4-6 weeks with carotid duplex      "

## 2025-04-01 ENCOUNTER — TELEPHONE (OUTPATIENT)
Dept: VASCULAR SURGERY | Facility: CLINIC | Age: 70
End: 2025-04-01
Payer: MEDICARE

## 2025-04-01 NOTE — TELEPHONE ENCOUNTER
----- Message from Karo sent at 4/1/2025  8:51 AM CDT -----  Regarding: Reschedule  Who Called: Jonna White is requesting assistance/information from provider's office.Patient needs to reschedule procedure that is scheduled on April 7th along with her appt. Preferred Method of Contact: Phone CallBest Call Back Number, if different: [phone] Information:

## 2025-04-04 ENCOUNTER — TELEPHONE (OUTPATIENT)
Dept: VASCULAR SURGERY | Facility: CLINIC | Age: 70
End: 2025-04-04
Payer: MEDICARE

## 2025-04-04 NOTE — TELEPHONE ENCOUNTER
Left patient a detailed VM. She WILL have her US done Monday 4/7 with Kaylene but will not see Braeden afterwards. The physician will review the results and they will call her later. I stressed that she will have her US done as planned.

## 2025-04-07 ENCOUNTER — HOSPITAL ENCOUNTER (OUTPATIENT)
Dept: RADIOLOGY | Facility: HOSPITAL | Age: 70
Discharge: HOME | End: 2025-04-07
Attending: NURSE PRACTITIONER
Payer: MEDICARE

## 2025-04-07 DIAGNOSIS — I65.23 BILATERAL CAROTID ARTERY STENOSIS: ICD-10-CM

## 2025-04-07 PROCEDURE — 93880 EXTRACRANIAL BILAT STUDY: CPT | Mod: 26,,, | Performed by: SURGERY

## 2025-04-07 PROCEDURE — 93880 EXTRACRANIAL BILAT STUDY: CPT | Mod: TC

## 2025-04-08 ENCOUNTER — TELEPHONE (OUTPATIENT)
Dept: VASCULAR SURGERY | Facility: CLINIC | Age: 70
End: 2025-04-08
Payer: MEDICARE

## 2025-04-09 ENCOUNTER — TELEPHONE (OUTPATIENT)
Dept: VASCULAR SURGERY | Facility: CLINIC | Age: 70
End: 2025-04-09
Payer: MEDICARE

## 2025-04-16 ENCOUNTER — HOSPITAL ENCOUNTER (OUTPATIENT)
Dept: RADIOLOGY | Facility: HOSPITAL | Age: 70
Discharge: HOME | End: 2025-04-16
Attending: NURSE PRACTITIONER
Payer: MEDICARE

## 2025-04-16 DIAGNOSIS — R10.9 ABDOMINAL PAIN, UNSPECIFIED ABDOMINAL LOCATION: ICD-10-CM

## 2025-04-16 DIAGNOSIS — M54.50 LOW BACK PAIN, UNSPECIFIED BACK PAIN LATERALITY, UNSPECIFIED CHRONICITY, UNSPECIFIED WHETHER SCIATICA PRESENT: ICD-10-CM

## 2025-04-16 PROCEDURE — 74176 CT ABD & PELVIS W/O CONTRAST: CPT | Mod: TC

## 2025-04-22 ENCOUNTER — TELEPHONE (OUTPATIENT)
Dept: UROLOGY | Facility: CLINIC | Age: 70
End: 2025-04-22
Payer: MEDICARE

## 2025-04-22 NOTE — TELEPHONE ENCOUNTER
----- Message from CORRINE White sent at 4/21/2025  4:47 PM CDT -----  Regarding: FW: Urology Ref  Patient is being referred out somewhere else.  Ella has been speaking with someone about her.  Info was referred back  to Suzi Robles to send to another provider, due to scheduling.  ----- Message -----  From: Maddi Alvarado RN  Sent: 4/21/2025   3:53 PM CDT  To: Cindy Chaney RN  Subject: FW: Urology Ref                                  Do you want to put her on [facility] schedule??  ----- Message -----  From: Suzi Robles  Sent: 4/21/2025   2:56 PM CDT  To: [facility] Staff  Subject: Urology Ref                                      Good afternoon, Mrs. Cunha recently did a CT ABD/Pelvis wo contrast on 04/16/25 showing a 5mm stone. PCP Esther Lainez, FNP states she is in pain and can not pass the stone. If possible, can she get in asap or worked in?Thank you

## 2025-04-26 ENCOUNTER — HOSPITAL ENCOUNTER (EMERGENCY)
Facility: HOSPITAL | Age: 70
Discharge: HOME | End: 2025-04-26
Attending: SPECIALIST
Payer: MEDICARE

## 2025-04-26 ENCOUNTER — RESULTS FOLLOW-UP (OUTPATIENT)
Dept: EMERGENCY MEDICINE | Facility: HOSPITAL | Age: 70
End: 2025-04-26
Payer: MEDICARE

## 2025-04-26 VITALS
BODY MASS INDEX: 28.69 KG/M2 | SYSTOLIC BLOOD PRESSURE: 156 MMHG | TEMPERATURE: 98 F | HEART RATE: 78 BPM | OXYGEN SATURATION: 100 % | HEIGHT: 66 IN | WEIGHT: 178.5 LBS | RESPIRATION RATE: 20 BRPM | DIASTOLIC BLOOD PRESSURE: 80 MMHG

## 2025-04-26 DIAGNOSIS — M54.31 SCIATICA OF RIGHT SIDE: Primary | ICD-10-CM

## 2025-04-26 DIAGNOSIS — M54.9 BACK PAIN, UNSPECIFIED BACK LOCATION, UNSPECIFIED BACK PAIN LATERALITY, UNSPECIFIED CHRONICITY: ICD-10-CM

## 2025-04-26 DIAGNOSIS — R11.2 NAUSEA AND VOMITING, UNSPECIFIED VOMITING TYPE: ICD-10-CM

## 2025-04-26 LAB
ALBUMIN SERPL BCP-MCNC: 3.9 G/DL (ref 3.4–4.8)
ALBUMIN/GLOB SERPL: 1 {RATIO}
ALP SERPL-CCNC: 96 U/L (ref 40–150)
ALT SERPL W P-5'-P-CCNC: 18 U/L
ANION GAP SERPL CALCULATED.3IONS-SCNC: 18 MMOL/L (ref 7–16)
AST SERPL W P-5'-P-CCNC: 35 U/L (ref 11–45)
BACTERIA #/AREA URNS HPF: ABNORMAL /HPF
BASOPHILS # BLD AUTO: 0.03 K/UL (ref 0–0.2)
BASOPHILS NFR BLD AUTO: 0.5 % (ref 0–1)
BILIRUB SERPL-MCNC: 0.4 MG/DL
BILIRUB UR QL STRIP: NEGATIVE
BUN SERPL-MCNC: 15 MG/DL (ref 10–20)
BUN/CREAT SERPL: 12 (ref 6–20)
CALCIUM SERPL-MCNC: 11.8 MG/DL (ref 8.4–10.2)
CHLORIDE SERPL-SCNC: 102 MMOL/L (ref 98–107)
CLARITY UR: ABNORMAL
CO2 SERPL-SCNC: 25 MMOL/L (ref 23–31)
COLOR UR: YELLOW
CREAT SERPL-MCNC: 1.28 MG/DL (ref 0.55–1.02)
DIFFERENTIAL METHOD BLD: ABNORMAL
EGFR (NO RACE VARIABLE) (RUSH/TITUS): 46 ML/MIN/1.73M2
EOSINOPHIL # BLD AUTO: 0.02 K/UL (ref 0–0.5)
EOSINOPHIL NFR BLD AUTO: 0.3 % (ref 1–4)
ERYTHROCYTE [DISTWIDTH] IN BLOOD BY AUTOMATED COUNT: 13 % (ref 11.5–14.5)
GLOBULIN SER-MCNC: 4.1 G/DL (ref 2–4)
GLUCOSE SERPL-MCNC: 241 MG/DL (ref 82–115)
GLUCOSE UR STRIP-MCNC: 100 MG/DL
HCT VFR BLD AUTO: 34.8 % (ref 38–47)
HGB BLD-MCNC: 11.7 G/DL (ref 12–16)
HYALINE CASTS #/AREA URNS LPF: ABNORMAL /LPF
IMM GRANULOCYTES # BLD AUTO: 0.03 K/UL (ref 0–0.04)
IMM GRANULOCYTES NFR BLD: 0.5 % (ref 0–0.4)
KETONES UR STRIP-SCNC: 40 MG/DL
LEUKOCYTE ESTERASE UR QL STRIP: ABNORMAL
LYMPHOCYTES # BLD AUTO: 1.02 K/UL (ref 1–4.8)
LYMPHOCYTES NFR BLD AUTO: 16.7 % (ref 27–41)
MCH RBC QN AUTO: 27.2 PG (ref 27–31)
MCHC RBC AUTO-ENTMCNC: 33.6 G/DL (ref 32–36)
MCV RBC AUTO: 80.9 FL (ref 80–96)
MONOCYTES # BLD AUTO: 0.29 K/UL (ref 0–0.8)
MONOCYTES NFR BLD AUTO: 4.8 % (ref 2–6)
MPC BLD CALC-MCNC: 10.3 FL (ref 9.4–12.4)
NEUTROPHILS # BLD AUTO: 4.7 K/UL (ref 1.8–7.7)
NEUTROPHILS NFR BLD AUTO: 77.2 % (ref 53–65)
NITRITE UR QL STRIP: NEGATIVE
NRBC # BLD AUTO: 0 X10E3/UL
NRBC, AUTO (.00): 0 %
PH UR STRIP: 5 PH UNITS
PLATELET # BLD AUTO: 260 K/UL (ref 150–400)
POTASSIUM SERPL-SCNC: 4 MMOL/L (ref 3.5–5.1)
PROT SERPL-MCNC: 8 G/DL (ref 5.8–7.6)
PROT UR QL STRIP: >=300
RBC # BLD AUTO: 4.3 M/UL (ref 4.2–5.4)
RBC # UR STRIP: ABNORMAL /UL
RBC #/AREA URNS HPF: ABNORMAL /HPF
SODIUM SERPL-SCNC: 141 MMOL/L (ref 136–145)
SP GR UR STRIP: >=1.03
SQUAMOUS #/AREA URNS LPF: ABNORMAL /LPF
UROBILINOGEN UR STRIP-ACNC: 0.2 MG/DL
WBC # BLD AUTO: 6.09 K/UL (ref 4.5–11)
WBC #/AREA URNS HPF: ABNORMAL /HPF

## 2025-04-26 PROCEDURE — 96375 TX/PRO/DX INJ NEW DRUG ADDON: CPT

## 2025-04-26 PROCEDURE — 63600175 PHARM REV CODE 636 W HCPCS: Performed by: SPECIALIST

## 2025-04-26 PROCEDURE — 96374 THER/PROPH/DIAG INJ IV PUSH: CPT

## 2025-04-26 PROCEDURE — 80053 COMPREHEN METABOLIC PANEL: CPT | Performed by: SPECIALIST

## 2025-04-26 PROCEDURE — 85025 COMPLETE CBC W/AUTO DIFF WBC: CPT | Performed by: SPECIALIST

## 2025-04-26 PROCEDURE — 81003 URINALYSIS AUTO W/O SCOPE: CPT | Performed by: SPECIALIST

## 2025-04-26 PROCEDURE — 99285 EMERGENCY DEPT VISIT HI MDM: CPT | Performed by: SPECIALIST

## 2025-04-26 PROCEDURE — 99285 EMERGENCY DEPT VISIT HI MDM: CPT | Mod: 25

## 2025-04-26 PROCEDURE — 36415 COLL VENOUS BLD VENIPUNCTURE: CPT | Performed by: SPECIALIST

## 2025-04-26 PROCEDURE — 25000003 PHARM REV CODE 250: Performed by: SPECIALIST

## 2025-04-26 PROCEDURE — 87086 URINE CULTURE/COLONY COUNT: CPT | Performed by: SPECIALIST

## 2025-04-26 PROCEDURE — 96361 HYDRATE IV INFUSION ADD-ON: CPT

## 2025-04-26 RX ORDER — MORPHINE SULFATE 2 MG/ML
2 INJECTION, SOLUTION INTRAMUSCULAR; INTRAVENOUS
Refills: 0 | Status: COMPLETED | OUTPATIENT
Start: 2025-04-26 | End: 2025-04-26

## 2025-04-26 RX ORDER — TAMSULOSIN HYDROCHLORIDE 0.4 MG/1
1 CAPSULE ORAL 2 TIMES DAILY
COMMUNITY
Start: 2025-04-24

## 2025-04-26 RX ORDER — KETOROLAC TROMETHAMINE 15 MG/ML
15 INJECTION, SOLUTION INTRAMUSCULAR; INTRAVENOUS
Status: COMPLETED | OUTPATIENT
Start: 2025-04-26 | End: 2025-04-26

## 2025-04-26 RX ORDER — HYDROCHLOROTHIAZIDE 25 MG/1
25 TABLET ORAL DAILY
COMMUNITY
Start: 2025-03-31

## 2025-04-26 RX ORDER — PROMETHAZINE HYDROCHLORIDE 25 MG/1
25 TABLET ORAL EVERY 6 HOURS PRN
Qty: 15 TABLET | Refills: 0 | Status: SHIPPED | OUTPATIENT
Start: 2025-04-26

## 2025-04-26 RX ORDER — ONDANSETRON HYDROCHLORIDE 2 MG/ML
8 INJECTION, SOLUTION INTRAVENOUS
Status: COMPLETED | OUTPATIENT
Start: 2025-04-26 | End: 2025-04-26

## 2025-04-26 RX ORDER — CEFTRIAXONE 1 G/1
1 INJECTION, POWDER, FOR SOLUTION INTRAMUSCULAR; INTRAVENOUS
Status: COMPLETED | OUTPATIENT
Start: 2025-04-26 | End: 2025-04-26

## 2025-04-26 RX ORDER — PANTOPRAZOLE SODIUM 40 MG/1
40 TABLET, DELAYED RELEASE ORAL DAILY
COMMUNITY
Start: 2025-04-16

## 2025-04-26 RX ORDER — TIRZEPATIDE 5 MG/.5ML
5 INJECTION, SOLUTION SUBCUTANEOUS
COMMUNITY
Start: 2025-04-15

## 2025-04-26 RX ADMIN — MORPHINE SULFATE 2 MG: 2 INJECTION, SOLUTION INTRAMUSCULAR; INTRAVENOUS at 04:04

## 2025-04-26 RX ADMIN — ONDANSETRON 8 MG: 2 INJECTION INTRAMUSCULAR; INTRAVENOUS at 03:04

## 2025-04-26 RX ADMIN — CEFTRIAXONE 1 G: 1 INJECTION, POWDER, FOR SOLUTION INTRAMUSCULAR; INTRAVENOUS at 04:04

## 2025-04-26 RX ADMIN — SODIUM CHLORIDE 1000 ML: 9 INJECTION, SOLUTION INTRAVENOUS at 03:04

## 2025-04-26 RX ADMIN — KETOROLAC TROMETHAMINE 15 MG: 15 INJECTION, SOLUTION INTRAMUSCULAR; INTRAVENOUS at 03:04

## 2025-04-26 NOTE — ED TRIAGE NOTES
Pt c/o right lower back pain with n/v. Pt states that she was seen in the er earlier this week and was diagnosed with kidney stone. However, pt states that her right hip feels numb.

## 2025-04-26 NOTE — ED PROVIDER NOTES
Encounter Date: 2025       History     Chief Complaint   Patient presents with    Back Pain     Patient is a 70 yo wf who has spinal stenosis and DDD of the lumbosacral spine.  Of interest, she was diagnosed with a kidney stone last week.  She comes in due to numbness in the right buttock and down leg.  She had some n/v.  She was seen by Esther Lainez NP last week.  Patient has had surgery on her lumbar spine      Review of patient's allergies indicates:   Allergen Reactions    Corticosteroids (glucocorticoids)      Past Medical History:   Diagnosis Date    Bilateral kidney stones 2023    Degeneration of lumbosacral intervertebral disc 2022    Diabetes mellitus, type 2     Diverticulitis 2023    Essential hypertension 2022    Lumbosacral radiculopathy 2022    Osteoarthritis     Primary hyperparathyroidism 2022    Spinal stenosis of lumbar region 2022    Thyroid disease      Past Surgical History:   Procedure Laterality Date     SECTION      CHOLECYSTECTOMY      KNEE ARTHROSCOPY W/ MENISCECTOMY Left 2021    Procedure: ARTHROSCOPY, KNEE, WITH MENISCECTOMY;  Surgeon: Manuel Cruz MD;  Location: [facility] OR;  Service: Orthopedics;  Laterality: Left;    REPAIR OF MENISCUS OF KNEE Left 2021    Procedure: REPAIR, MENISCUS, KNEE;  Surgeon: Manuel Cruz MD;  Location: [facility] OR;  Service: Orthopedics;  Laterality: Left;    SPINE SURGERY      TUBAL LIGATION       Family History   Problem Relation Name Age of Onset    Cancer Mother      Cancer Father       Social History[1]  Review of Systems   Musculoskeletal:  Positive for back pain.   Neurological:  Positive for numbness.   All other systems reviewed and are negative.      Physical Exam     Initial Vitals [25 1520]   BP Pulse Resp Temp SpO2   (!) 158/84 107 20 98 °F (36.7 °C) 99 %      MAP       --         Physical Exam    Nursing note and vitals reviewed.  Constitutional: She appears  well-developed and well-nourished. No distress.   HENT:   Head: Normocephalic and atraumatic. Mouth/Throat: Oropharynx is clear and moist.   Eyes: Conjunctivae are normal. Pupils are equal, round, and reactive to light.   Neck: Neck supple.   Normal range of motion.  Cardiovascular:  Normal rate, regular rhythm and normal heart sounds.           Pulmonary/Chest: Breath sounds normal.   Abdominal: Abdomen is soft.   Musculoskeletal:         General: Normal range of motion.      Cervical back: Normal range of motion and neck supple.      Comments: Positive right straight leg     Neurological: She is alert.   Skin: Skin is warm.   Psychiatric: She has a normal mood and affect. Judgment and thought content normal.         Medical Screening Exam   See Full Note    ED Course   Procedures  Labs Reviewed   COMPREHENSIVE METABOLIC PANEL - Abnormal       Result Value    Sodium 141      Potassium 4.0      Chloride 102      CO2 25      Anion Gap 18 (*)     Glucose 241 (*)     BUN 15      Creatinine 1.28 (*)     BUN/Creatinine Ratio 12      Calcium 11.8 (*)     Total Protein 8.0 (*)     Albumin 3.9      Globulin 4.1 (*)     A/G Ratio 1.0      Bilirubin, Total 0.4      Alk Phos 96      ALT 18      AST 35      eGFR 46 (*)    URINALYSIS, REFLEX TO URINE CULTURE - Abnormal    Color, UA Yellow      Clarity, UA Slightly Cloudy      pH, UA 5.0      Leukocytes, UA Trace (*)     Nitrites, UA Negative      Protein, UA >=300 (*)     Glucose,  (*)     Ketones, UA 40 (*)     Urobilinogen, UA 0.2      Bilirubin, UA Negative      Blood, UA Trace-Intact (*)     Specific Gravity, UA >=1.030 (*)    CBC WITH DIFFERENTIAL - Abnormal    WBC 6.09      RBC 4.30      Hemoglobin 11.7 (*)     Hematocrit 34.8 (*)     MCV 80.9      MCH 27.2      MCHC 33.6      RDW 13.0      Platelet Count 260      MPV 10.3      Neutrophils % 77.2 (*)     Lymphocytes % 16.7 (*)     Monocytes % 4.8      Eosinophils % 0.3 (*)     Basophils % 0.5      Immature  Granulocytes % 0.5 (*)     nRBC, Auto 0.0      Neutrophils, Abs 4.70      Lymphocytes, Absolute 1.02      Monocytes, Absolute 0.29      Eosinophils, Absolute 0.02      Basophils, Absolute 0.03      Immature Granulocytes, Absolute 0.03      nRBC, Absolute 0.00      Diff Type Auto     URINALYSIS, MICROSCOPIC - Abnormal    WBC, UA 5-10 (*)     RBC, UA 0-3      Bacteria, UA Many (*)     Squamous Epithelial Cells, UA Many (*)     Hyaline Casts, UA 5-10 (*)    CULTURE, URINE   CBC W/ AUTO DIFFERENTIAL    Narrative:     The following orders were created for panel order CBC auto differential.  Procedure                               Abnormality         Status                     ---------                               -----------         ------                     CBC with Differential[[id number]]       Abnormal            Final result                 Please view results for these tests on the individual orders.          Imaging Results              CT Abdomen Pelvis  Without Contrast (In process)                      Medications   ondansetron injection 8 mg (8 mg Intravenous Given 4/26/25 1535)   sodium chloride 0.9% bolus 1,000 mL 1,000 mL ( Intravenous Stopped 4/26/25 1650)   ketorolac injection 15 mg (15 mg Intravenous Given 4/26/25 1535)   cefTRIAXone injection 1 g (1 g Intravenous Given 4/26/25 1657)   morphine injection 2 mg (2 mg Intravenous Given 4/26/25 1651)     Medical Decision Making  Right sciatica with numbness vs kidney stone     Amount and/or Complexity of Data Reviewed  Labs: ordered.  Radiology: ordered.    Risk  Prescription drug management.                                      Clinical Impression:   Final diagnoses:  [M54.31] Sciatica of right side (Primary)  [R11.2] Nausea and vomiting, unspecified vomiting type  [M54.9] Back pain, unspecified back location, unspecified back pain laterality, unspecified chronicity        ED Disposition Condition    Discharge Good          ED Prescriptions    None        Follow-up Information       Follow up With Specialties Details Why Contact Info    Dahiana Quintero MD Family Medicine   [address]  [facility]  [place] [address] [phone]                 Jeanne Mao MD  04/26/25 1642         [1]   Social History  Tobacco Use    Smoking status: Never    Smokeless tobacco: Never   Substance Use Topics    Alcohol use: Not Currently    Drug use: Never     Comment: Prescribed by Jeanne Baron MD  04/26/25 [id number]

## 2025-04-29 LAB — UA COMPLETE W REFLEX CULTURE PNL UR: ABNORMAL

## 2025-05-01 RX ORDER — NITROFURANTOIN 25; 75 MG/1; MG/1
100 CAPSULE ORAL 2 TIMES DAILY
Qty: 10 CAPSULE | Refills: 0 | Status: SHIPPED | OUTPATIENT
Start: 2025-05-01 | End: 2025-05-06

## 2025-05-27 NOTE — PLAN OF CARE
Problem: Physical Therapy  Goal: Physical Therapy Goal  Description: Short Term Goals  1. Patient will complete 30 reps of B LE exercises with correct form.   2. Patient will complete sit<>stand transfers with SBA.  3. Patient will ambulate 100 feet with RW on level surfaces with CGA.     Long Term Goals   1. Patient will ambulate 300 feet with RW on level and unlevel surfaces with SBA.  2. Patient will complete all functional transfers with MOD I.  3. L knee AROM 0-110.   4. Patient will negotiate up and down 5 stairs with use of handrail with SBA.     Outcome: Ongoing, Progressing   Continue POC Per PT order to progress patient toward rehab goals as tolerated by patient.  DANNIE Angel 9/23/2022     Pt withdrawn to her room and isolated to self. Denies all psych s/s. No C/o pain. Pt refused melatonin 3mg and remeron 7.5mg  HS medications. Safety maintained. Will continue to monitor.

## 2025-07-11 DIAGNOSIS — I65.23 BILATERAL CAROTID ARTERY STENOSIS: Primary | ICD-10-CM

## 2025-07-29 ENCOUNTER — HOSPITAL ENCOUNTER (OUTPATIENT)
Dept: RADIOLOGY | Facility: HOSPITAL | Age: 70
Discharge: HOME | End: 2025-07-29
Payer: MEDICARE

## 2025-07-29 DIAGNOSIS — M71.572 OTHER BURSITIS, NOT ELSEWHERE CLASSIFIED, LEFT ANKLE AND FOOT: ICD-10-CM

## 2025-07-29 DIAGNOSIS — M71.571 OTHER BURSITIS, NOT ELSEWHERE CLASSIFIED, RIGHT ANKLE AND FOOT: ICD-10-CM

## 2025-07-29 PROCEDURE — 73610 X-RAY EXAM OF ANKLE: CPT | Mod: 26,LT,, | Performed by: RADIOLOGY

## 2025-07-29 PROCEDURE — 73590 X-RAY EXAM OF LOWER LEG: CPT | Mod: TC,LT

## 2025-07-29 PROCEDURE — 73610 X-RAY EXAM OF ANKLE: CPT | Mod: TC,LT

## 2025-07-29 PROCEDURE — 73590 X-RAY EXAM OF LOWER LEG: CPT | Mod: 26,LT,, | Performed by: RADIOLOGY

## 2025-08-21 ENCOUNTER — HOSPITAL ENCOUNTER (OUTPATIENT)
Dept: RADIOLOGY | Facility: HOSPITAL | Age: 70
Discharge: HOME | End: 2025-08-21
Attending: NURSE PRACTITIONER
Payer: MEDICARE

## 2025-08-21 DIAGNOSIS — N20.0 CALCULUS OF KIDNEY: ICD-10-CM

## 2025-08-21 DIAGNOSIS — R10.9 ABDOMINAL PAIN, UNSPECIFIED ABDOMINAL LOCATION: ICD-10-CM

## 2025-08-21 PROCEDURE — 74176 CT ABD & PELVIS W/O CONTRAST: CPT | Mod: 26,,, | Performed by: RADIOLOGY

## 2025-08-21 PROCEDURE — 74176 CT ABD & PELVIS W/O CONTRAST: CPT | Mod: TC

## (undated) DEVICE — BLADE PERFORMANCE SAG 21X90MM

## (undated) DEVICE — SUTURE PDS II 1 CT VIOLET 36IN

## (undated) DEVICE — TOURNIQUET SB QC SP 34X4IN

## (undated) DEVICE — SUTURE ETHILON 3-0 18 BLK PS2

## (undated) DEVICE — COVER MAYO STAND W/PAD 23X54IN

## (undated) DEVICE — SKIN STAPLER PMR35

## (undated) DEVICE — WAND COBLATION WEREWOLF FLOW 50

## (undated) DEVICE — SYRINGE 10-12CC LURE -LOK TIP

## (undated) DEVICE — SPONGE GAUZE 4X4 12 PLY STL AMD 10/TRAY

## (undated) DEVICE — SEE MEDLINE ITEM 146292

## (undated) DEVICE — SOL NACL IRR 1000ML BTL

## (undated) DEVICE — DRAPE ARTHROSCOPY SHEET

## (undated) DEVICE — GLOVE BIOGEL SKINSENSE PI 7.0

## (undated) DEVICE — KIT TOTAL KNEE RUSH

## (undated) DEVICE — TOURNIQUET CUFF DISP QC 34 INCH

## (undated) DEVICE — GLOVE BIOGEL SKINSENSE PI 7.5

## (undated) DEVICE — NDL QUINCKE SPINAL 18G 3.5IN

## (undated) DEVICE — GLOVE BIOGEL SKINSENSE PI 6.5

## (undated) DEVICE — CDS KNEE ARTHROSCOPY

## (undated) DEVICE — DRESSING ABD SURGIPAD 8X7.5IN

## (undated) DEVICE — WATER BOOM FLOOR SUCTION STRIP

## (undated) DEVICE — OVERLAY MATTRESS WAFFLE

## (undated) DEVICE — APPLICATOR CHLORAPREP ORN 26ML

## (undated) DEVICE — SOL IRRIGATION SALINE 3000ML BAG

## (undated) DEVICE — APPLICATOR CHLORAPREP HI-LITE TINTED ORANGE 26ML

## (undated) DEVICE — TUBING SUCTION 5MM STERILE 3/16IN X 12FT

## (undated) DEVICE — SHAVER SYNNOVATOR PLAT SERIES 4.5MM

## (undated) DEVICE — DRAPE INCISE IOBAN 2 23X23IN

## (undated) DEVICE — GLOVE SURGICAL PROTEXIS PI BLUE SIZE 8.5

## (undated) DEVICE — BANDAGE ELASTIC FLEX-MASTER 6INX11YD STL DBL LNGTH

## (undated) DEVICE — GLOVE SURGICAL PROTEXIS PI CLASSIC SIZE 7.0

## (undated) DEVICE — PAD CAST SPECIALIST STRL 3

## (undated) DEVICE — SPACESUIT TOGA T5 ZIPPER PEEL

## (undated) DEVICE — GAUZE XEROFORM 1X8IN

## (undated) DEVICE — PAD ABDOMINAL 8X7.5 STERILE

## (undated) DEVICE — DRAPE U STERI 1015 CLEAR 47 1/8X51 1/8

## (undated) DEVICE — COMPR KNEE STRAIGHT STAY 20IN

## (undated) DEVICE — SPONGE COTTON WOVEN 4X4IN

## (undated) DEVICE — SYR 50CC LL

## (undated) DEVICE — TUBING ARTHRO STRYKER

## (undated) DEVICE — SUTURE MANAGER NOVOCUT

## (undated) DEVICE — CANNULA FAST FIX 360 KPSC AND SLOTTED

## (undated) DEVICE — SPHERE MARKER REFLECTIVE DISP

## (undated) DEVICE — GLOVE SURGICAL PROTEXIS PI BLUE SIZE 7.0

## (undated) DEVICE — KIT IRR SUCTION HND PIECE

## (undated) DEVICE — SET CYSTO IRR DRP CHMBR 84IN

## (undated) DEVICE — SUT 2-0 VICRYL / CT-1

## (undated) DEVICE — THERAPY COLD UNIT COMBO SHOULDER AND KNEE

## (undated) DEVICE — CANISTER SUCTION 12000ML DISPOSABLE

## (undated) DEVICE — GLOVE SURGICAL PROTEXIS PI CLASSIC SIZE 8.0

## (undated) DEVICE — BLADE RECIP DOUBLE SIDED

## (undated) DEVICE — SEE MEDLINE ITEM 146345

## (undated) DEVICE — SUT VICRYL CTD 1 27IN CP

## (undated) DEVICE — WRAP KNEE ACTIVE PO WITH 4 COLD PKS L/XL

## (undated) DEVICE — TOWER MIX CEMENT BONE SMARTMIX

## (undated) DEVICE — SOL NACL IRR 3000ML

## (undated) DEVICE — NEEDLE ECLIPSE 25GX1IN SAFETY

## (undated) DEVICE — GLOVE 7.0 PROTEXIS PI BLUE